# Patient Record
Sex: FEMALE | Race: WHITE
[De-identification: names, ages, dates, MRNs, and addresses within clinical notes are randomized per-mention and may not be internally consistent; named-entity substitution may affect disease eponyms.]

---

## 2018-02-18 ENCOUNTER — HOSPITAL ENCOUNTER (EMERGENCY)
Dept: HOSPITAL 47 - EC | Age: 53
Discharge: HOME | End: 2018-02-18
Payer: COMMERCIAL

## 2018-02-18 VITALS — TEMPERATURE: 98.4 F

## 2018-02-18 VITALS — DIASTOLIC BLOOD PRESSURE: 63 MMHG | HEART RATE: 99 BPM | SYSTOLIC BLOOD PRESSURE: 115 MMHG | RESPIRATION RATE: 16 BRPM

## 2018-02-18 DIAGNOSIS — R10.84: ICD-10-CM

## 2018-02-18 DIAGNOSIS — R19.7: ICD-10-CM

## 2018-02-18 DIAGNOSIS — Z90.49: ICD-10-CM

## 2018-02-18 DIAGNOSIS — Z90.710: ICD-10-CM

## 2018-02-18 DIAGNOSIS — R11.2: Primary | ICD-10-CM

## 2018-02-18 DIAGNOSIS — Z88.8: ICD-10-CM

## 2018-02-18 DIAGNOSIS — Z85.850: ICD-10-CM

## 2018-02-18 DIAGNOSIS — Z85.3: ICD-10-CM

## 2018-02-18 DIAGNOSIS — Z79.899: ICD-10-CM

## 2018-02-18 DIAGNOSIS — K21.9: ICD-10-CM

## 2018-02-18 LAB
ALBUMIN SERPL-MCNC: 4.6 G/DL (ref 3.5–5)
ALP SERPL-CCNC: 90 U/L (ref 38–126)
ALT SERPL-CCNC: 29 U/L (ref 9–52)
ANION GAP SERPL CALC-SCNC: 14 MMOL/L
AST SERPL-CCNC: 20 U/L (ref 14–36)
BASOPHILS # BLD AUTO: 0.1 K/UL (ref 0–0.2)
BASOPHILS NFR BLD AUTO: 0 %
BUN SERPL-SCNC: 15 MG/DL (ref 7–17)
CALCIUM SPEC-MCNC: 9.4 MG/DL (ref 8.4–10.2)
CHLORIDE SERPL-SCNC: 108 MMOL/L (ref 98–107)
CO2 SERPL-SCNC: 22 MMOL/L (ref 22–30)
EOSINOPHIL # BLD AUTO: 0.2 K/UL (ref 0–0.7)
EOSINOPHIL NFR BLD AUTO: 1 %
ERYTHROCYTE [DISTWIDTH] IN BLOOD BY AUTOMATED COUNT: 5 M/UL (ref 3.8–5.4)
ERYTHROCYTE [DISTWIDTH] IN BLOOD: 12.7 % (ref 11.5–15.5)
GLUCOSE SERPL-MCNC: 114 MG/DL (ref 74–99)
HCT VFR BLD AUTO: 46.3 % (ref 34–46)
HGB BLD-MCNC: 15.4 GM/DL (ref 11.4–16)
HYALINE CASTS UR QL AUTO: 3 /LPF (ref 0–2)
LIPASE SERPL-CCNC: 62 U/L (ref 23–300)
LYMPHOCYTES # SPEC AUTO: 0.8 K/UL (ref 1–4.8)
LYMPHOCYTES NFR SPEC AUTO: 6 %
MCH RBC QN AUTO: 30.7 PG (ref 25–35)
MCHC RBC AUTO-ENTMCNC: 33.1 G/DL (ref 31–37)
MCV RBC AUTO: 92.7 FL (ref 80–100)
MONOCYTES # BLD AUTO: 0.4 K/UL (ref 0–1)
MONOCYTES NFR BLD AUTO: 3 %
NEUTROPHILS # BLD AUTO: 13 K/UL (ref 1.3–7.7)
NEUTROPHILS NFR BLD AUTO: 89 %
PH UR: 5 [PH] (ref 5–8)
PLATELET # BLD AUTO: 251 K/UL (ref 150–450)
POTASSIUM SERPL-SCNC: 4.2 MMOL/L (ref 3.5–5.1)
PROT SERPL-MCNC: 7.8 G/DL (ref 6.3–8.2)
PROT UR QL: (no result)
RBC UR QL: 1 /HPF (ref 0–5)
SODIUM SERPL-SCNC: 144 MMOL/L (ref 137–145)
SP GR UR: 1.03 (ref 1–1.03)
SQUAMOUS UR QL AUTO: 2 /HPF (ref 0–4)
UROBILINOGEN UR QL STRIP: <2 MG/DL (ref ?–2)
WBC # BLD AUTO: 14.6 K/UL (ref 3.8–10.6)
WBC #/AREA URNS HPF: 2 /HPF (ref 0–5)

## 2018-02-18 PROCEDURE — 87502 INFLUENZA DNA AMP PROBE: CPT

## 2018-02-18 PROCEDURE — 80053 COMPREHEN METABOLIC PANEL: CPT

## 2018-02-18 PROCEDURE — 81001 URINALYSIS AUTO W/SCOPE: CPT

## 2018-02-18 PROCEDURE — 96372 THER/PROPH/DIAG INJ SC/IM: CPT

## 2018-02-18 PROCEDURE — 36415 COLL VENOUS BLD VENIPUNCTURE: CPT

## 2018-02-18 PROCEDURE — 96361 HYDRATE IV INFUSION ADD-ON: CPT

## 2018-02-18 PROCEDURE — 96375 TX/PRO/DX INJ NEW DRUG ADDON: CPT

## 2018-02-18 PROCEDURE — 99284 EMERGENCY DEPT VISIT MOD MDM: CPT

## 2018-02-18 PROCEDURE — 96374 THER/PROPH/DIAG INJ IV PUSH: CPT

## 2018-02-18 PROCEDURE — 83690 ASSAY OF LIPASE: CPT

## 2018-02-18 PROCEDURE — 85025 COMPLETE CBC W/AUTO DIFF WBC: CPT

## 2018-02-18 NOTE — ED
Nausea/Vomiting/Diarrhea HPI





- General


Chief complaint: Nausea/Vomiting/Diarrhea


Stated complaint: NVD


Time Seen by Provider: 02/18/18 16:53


Source: patient


Mode of arrival: wheelchair


Limitations: no limitations





- History of Present Illness


Initial comments: 


Patient presents with one day of nausea vomiting diarrhea.  Patient states 

associated with diffuse nominal cramping pain.  Denies blood in vomit or stool.

  Denies fever.  Patient states history of cholecystectomy.  Patient's son was 

seen and treated in the ER for the same symptoms 3 days ago by myself. 





MD complaint: nausea, vomiting, diarrhea, abdominal pain





- Related Data


 Home Medications











 Medication  Instructions  Recorded  Confirmed


 


ALPRAZolam [Xanax] 0.25 mg PO DAILY PRN 02/18/18 02/18/18


 


Butalb/APAP/Caff -40Mg 1 tab PO Q4H PRN 02/18/18 02/18/18





[Fioricet -40]   


 


Calcium Carbonate [Tums] 500 mg PO QID PRN 02/18/18 02/18/18


 


Docusate [Colace] 100 mg PO DAILY PRN 02/18/18 02/18/18


 


Ergocalciferol [Vitamin D2] 50,000 unit PO Q7D 02/18/18 02/18/18


 


Hydrocodone/Acetaminophen [Norco 1 tab PO Q6H PRN 02/18/18 02/18/18





]   


 


Levothyroxine Sodium [Synthroid] 175 mcg PO DAILY 02/18/18 02/18/18


 


Lidocaine-Prilocaine Cream [Emla 1 applic TOPICAL AS DIRECTED PRN 02/18/18 02/18 /18





Cream 2.5%/2.5%]   


 


Loratadine 10 mg PO DAILY 02/18/18 02/18/18


 


Omeprazole 20 mg PO DAILY 02/18/18 02/18/18


 


Prochlorperazine [Compazine] 10 mg PO BID PRN 02/18/18 02/18/18


 


Sodium Chloride [Saline Nasal 1 spray EA NOSTRIL DAILY PRN 02/18/18 02/18/18





Spray]   


 


Tamoxifen Citrate 20 mg PO DAILY 02/18/18 02/18/18








 Previous Rx's











 Medication  Instructions  Recorded


 


Dicyclomine [Bentyl] 20 mg PO BID PRN #10 tablet 02/18/18


 


Ondansetron Odt [Zofran Odt] 4 mg PO Q8HR PRN #15 tab 02/18/18











 Allergies











Allergy/AdvReac Type Severity Reaction Status Date / Time


 


gabapentin Allergy  Swelling Verified 02/18/18 16:50














Review of Systems


ROS Statement: 


Those systems with pertinent positive or pertinent negative responses have been 

documented in the HPI.





ROS Other: All systems not noted in ROS Statement are negative.


Constitutional: Denies: fever, chills, weakness


Eyes: Denies: vision change


ENT: Denies: ear pain, throat pain, congestion


Respiratory: Denies: cough, dyspnea


Cardiovascular: Denies: chest pain, palpitations, syncope


Endocrine: Reports: fatigue


Gastrointestinal: Reports: abdominal pain, nausea, vomiting, diarrhea.  Denies: 

constipation, hematemesis, melena, hematochezia


Genitourinary: Denies: urgency, dysuria, frequency, hematuria


Musculoskeletal: Reports: other (LE mm cramps).  Denies: back pain


Skin: Denies: rash


Neurological: Denies: headache





Past Medical History


Past Medical History: Atrial Fibrillation, Cancer, Fibromyalgia, GERD/Reflux


Additional Past Medical History / Comment(s): breast and thyroid cancer , 

chronic back pain, diverticulitis


History of Any Multi-Drug Resistant Organisms: None Reported


Past Surgical History: Ablation, Cholecystectomy, Hysterectomy


Additional Past Surgical History / Comment(s): lumpectomy left breast, 

lobectomy thyroid


Past Psychological History: No Psychological Hx Reported


Smoking Status: Never smoker


Past Alcohol Use History: None Reported


Past Drug Use History: Marijuana





General Exam





- General Exam Comments


Initial Comments: 


Laying on bed on side hugging pillow, appears mild to moderately uncomfortable, 

although patient conversing normally and smiling and pleasant.





Limitations: no limitations


General appearance: alert


Head exam: Present: atraumatic, normocephalic


Eye exam: Present: normal appearance, PERRL, EOMI


ENT exam: Present: normal exam, normal oropharynx, mucous membranes moist, TM's 

normal bilaterally, other (Tympanic membranes clear bilaterally)


Neck exam: Present: normal inspection


Respiratory exam: Present: normal lung sounds bilaterally.  Absent: respiratory 

distress, wheezes, rales, rhonchi


Cardiovascular Exam: Present: regular rate, normal rhythm


GI/Abdominal exam: Present: soft, tenderness, other (Mild diffuse tenderness).  

Absent: distended, guarding, rebound, rigid, hernia


Extremities exam: Present: normal inspection


Neurological exam: Present: alert, oriented X3


Psychiatric exam: Present: normal affect, normal mood


Skin exam: Present: warm, dry, intact, normal color.  Absent: rash, cyanosis





Course


 Vital Signs











  02/18/18 02/18/18





  16:32 18:37


 


Temperature 98.2 F 


 


Pulse Rate 116 H 99


 


Respiratory 18 16





Rate  


 


Blood Pressure 125/69 115/63


 


O2 Sat by Pulse 95 





Oximetry  














Medical Decision Making





- Medical Decision Making


IV fluids, Pepcid, Zofran, Toradol, Bentyl given





Mild elevation of white blood cell count.





No other significant lab abnormalities.





Patient reevaluated, states she is feeling much better following medications.  

States nausea resolved following medication.  Patient feels comfortable going 

home.  Supportive care discussed.  Prescription of Zofran and Bentyl given.  

Discussed oral hydration.  Patient to follow primary care physician.  Return to 

ER for new or worsening symptoms including increased abdominal pain, not 

tolerating oral intake, fevers.  Patient understands and agrees.  Patient is 

very happy with her care.








- Lab Data


Result diagrams: 


 02/18/18 17:05





 02/18/18 17:05


 Lab Results











  02/18/18 02/18/18 02/18/18 Range/Units





  17:05 17:05 17:05 


 


WBC  14.6 H    (3.8-10.6)  k/uL


 


RBC  5.00    (3.80-5.40)  m/uL


 


Hgb  15.4    (11.4-16.0)  gm/dL


 


Hct  46.3 H    (34.0-46.0)  %


 


MCV  92.7    (80.0-100.0)  fL


 


MCH  30.7    (25.0-35.0)  pg


 


MCHC  33.1    (31.0-37.0)  g/dL


 


RDW  12.7    (11.5-15.5)  %


 


Plt Count  251    (150-450)  k/uL


 


Neutrophils %  89    %


 


Lymphocytes %  6    %


 


Monocytes %  3    %


 


Eosinophils %  1    %


 


Basophils %  0    %


 


Neutrophils #  13.0 H    (1.3-7.7)  k/uL


 


Lymphocytes #  0.8 L    (1.0-4.8)  k/uL


 


Monocytes #  0.4    (0-1.0)  k/uL


 


Eosinophils #  0.2    (0-0.7)  k/uL


 


Basophils #  0.1    (0-0.2)  k/uL


 


Sodium   144   (137-145)  mmol/L


 


Potassium   4.2   (3.5-5.1)  mmol/L


 


Chloride   108 H   ()  mmol/L


 


Carbon Dioxide   22   (22-30)  mmol/L


 


Anion Gap   14   mmol/L


 


BUN   15   (7-17)  mg/dL


 


Creatinine   0.83   (0.52-1.04)  mg/dL


 


Est GFR (MDRD) Af Amer   >60   (>60 ml/min/1.73 sqM)  


 


Est GFR (MDRD) Non-Af   >60   (>60 ml/min/1.73 sqM)  


 


Glucose   114 H   (74-99)  mg/dL


 


Calcium   9.4   (8.4-10.2)  mg/dL


 


Total Bilirubin   0.8   (0.2-1.3)  mg/dL


 


AST   20   (14-36)  U/L


 


ALT   29   (9-52)  U/L


 


Alkaline Phosphatase   90   ()  U/L


 


Total Protein   7.8   (6.3-8.2)  g/dL


 


Albumin   4.6   (3.5-5.0)  g/dL


 


Lipase   62   ()  U/L


 


Urine Color     


 


Urine Appearance     (Clear)  


 


Urine pH     (5.0-8.0)  


 


Ur Specific Gravity     (1.001-1.035)  


 


Urine Protein     (Negative)  


 


Urine Glucose (UA)     (Negative)  


 


Urine Ketones     (Negative)  


 


Urine Blood     (Negative)  


 


Urine Nitrite     (Negative)  


 


Urine Bilirubin     (Negative)  


 


Urine Urobilinogen     (<2.0)  mg/dL


 


Ur Leukocyte Esterase     (Negative)  


 


Urine RBC     (0-5)  /hpf


 


Urine WBC     (0-5)  /hpf


 


Ur Squamous Epith Cells     (0-4)  /hpf


 


Amorphous Sediment     (None)  /hpf


 


Hyaline Casts     (0-2)  /lpf


 


Urine Mucus     (None)  /hpf


 


Influenza Type A RNA    Not Detected  (Not Detectd)  


 


Influenza Type B (PCR)    Not Detected  (Not Detectd)  














  02/18/18 Range/Units





  17:55 


 


WBC   (3.8-10.6)  k/uL


 


RBC   (3.80-5.40)  m/uL


 


Hgb   (11.4-16.0)  gm/dL


 


Hct   (34.0-46.0)  %


 


MCV   (80.0-100.0)  fL


 


MCH   (25.0-35.0)  pg


 


MCHC   (31.0-37.0)  g/dL


 


RDW   (11.5-15.5)  %


 


Plt Count   (150-450)  k/uL


 


Neutrophils %   %


 


Lymphocytes %   %


 


Monocytes %   %


 


Eosinophils %   %


 


Basophils %   %


 


Neutrophils #   (1.3-7.7)  k/uL


 


Lymphocytes #   (1.0-4.8)  k/uL


 


Monocytes #   (0-1.0)  k/uL


 


Eosinophils #   (0-0.7)  k/uL


 


Basophils #   (0-0.2)  k/uL


 


Sodium   (137-145)  mmol/L


 


Potassium   (3.5-5.1)  mmol/L


 


Chloride   ()  mmol/L


 


Carbon Dioxide   (22-30)  mmol/L


 


Anion Gap   mmol/L


 


BUN   (7-17)  mg/dL


 


Creatinine   (0.52-1.04)  mg/dL


 


Est GFR (MDRD) Af Amer   (>60 ml/min/1.73 sqM)  


 


Est GFR (MDRD) Non-Af   (>60 ml/min/1.73 sqM)  


 


Glucose   (74-99)  mg/dL


 


Calcium   (8.4-10.2)  mg/dL


 


Total Bilirubin   (0.2-1.3)  mg/dL


 


AST   (14-36)  U/L


 


ALT   (9-52)  U/L


 


Alkaline Phosphatase   ()  U/L


 


Total Protein   (6.3-8.2)  g/dL


 


Albumin   (3.5-5.0)  g/dL


 


Lipase   ()  U/L


 


Urine Color  Yellow  


 


Urine Appearance  Cloudy H  (Clear)  


 


Urine pH  5.0  (5.0-8.0)  


 


Ur Specific Gravity  1.029  (1.001-1.035)  


 


Urine Protein  1+ H  (Negative)  


 


Urine Glucose (UA)  Negative  (Negative)  


 


Urine Ketones  Negative  (Negative)  


 


Urine Blood  Negative  (Negative)  


 


Urine Nitrite  Negative  (Negative)  


 


Urine Bilirubin  Negative  (Negative)  


 


Urine Urobilinogen  <2.0  (<2.0)  mg/dL


 


Ur Leukocyte Esterase  Negative  (Negative)  


 


Urine RBC  1  (0-5)  /hpf


 


Urine WBC  2  (0-5)  /hpf


 


Ur Squamous Epith Cells  2  (0-4)  /hpf


 


Amorphous Sediment  Moderate H  (None)  /hpf


 


Hyaline Casts  3 H  (0-2)  /lpf


 


Urine Mucus  Many H  (None)  /hpf


 


Influenza Type A RNA   (Not Detectd)  


 


Influenza Type B (PCR)   (Not Detectd)  














Disposition


Clinical Impression: 


 Nausea and vomiting, Diarrhea, Abdominal pain





Disposition: HOME SELF-CARE


Condition: Good


Instructions:  Acute Nausea and Vomiting (ED), Acute Diarrhea (ED), Abdominal 

Pain (ED)


Additional Instructions: 


Salvatore primary care physician one to 2 days for reevaluation.  Return to ER if 

new or worsening symptoms including inability to tolerate oral intake or 

increased abdominal pain or fevers.


Prescriptions: 


Dicyclomine [Bentyl] 20 mg PO BID PRN #10 tablet


 PRN Reason: abdominal cramps


Ondansetron Odt [Zofran Odt] 4 mg PO Q8HR PRN #15 tab


 PRN Reason: Nausea


Referrals: 


Jose Elias Johnson MD [Primary Care Provider] - 1-2 days

## 2018-12-04 ENCOUNTER — HOSPITAL ENCOUNTER (EMERGENCY)
Dept: HOSPITAL 47 - EC | Age: 53
Discharge: HOME | End: 2018-12-04
Payer: COMMERCIAL

## 2018-12-04 VITALS — RESPIRATION RATE: 16 BRPM | TEMPERATURE: 99.4 F

## 2018-12-04 VITALS — HEART RATE: 82 BPM | DIASTOLIC BLOOD PRESSURE: 77 MMHG | SYSTOLIC BLOOD PRESSURE: 118 MMHG

## 2018-12-04 DIAGNOSIS — M79.7: ICD-10-CM

## 2018-12-04 DIAGNOSIS — Z88.8: ICD-10-CM

## 2018-12-04 DIAGNOSIS — Z79.899: ICD-10-CM

## 2018-12-04 DIAGNOSIS — K21.9: ICD-10-CM

## 2018-12-04 DIAGNOSIS — R07.89: Primary | ICD-10-CM

## 2018-12-04 DIAGNOSIS — Z85.3: ICD-10-CM

## 2018-12-04 DIAGNOSIS — I48.91: ICD-10-CM

## 2018-12-04 DIAGNOSIS — Z85.850: ICD-10-CM

## 2018-12-04 LAB
ALBUMIN SERPL-MCNC: 4.3 G/DL (ref 3.5–5)
ALP SERPL-CCNC: 77 U/L (ref 38–126)
ALT SERPL-CCNC: 22 U/L (ref 9–52)
AMYLASE SERPL-CCNC: 42 U/L (ref 30–110)
ANION GAP SERPL CALC-SCNC: 10 MMOL/L
APTT BLD: 22 SEC (ref 22–30)
AST SERPL-CCNC: 27 U/L (ref 14–36)
BASOPHILS # BLD AUTO: 0.1 K/UL (ref 0–0.2)
BASOPHILS NFR BLD AUTO: 1 %
BUN SERPL-SCNC: 12 MG/DL (ref 7–17)
CALCIUM SPEC-MCNC: 9 MG/DL (ref 8.4–10.2)
CHLORIDE SERPL-SCNC: 107 MMOL/L (ref 98–107)
CK SERPL-CCNC: 45 U/L (ref 30–135)
CO2 SERPL-SCNC: 24 MMOL/L (ref 22–30)
D DIMER PPP FEU-MCNC: 0.45 MG/L FEU (ref ?–0.6)
EOSINOPHIL # BLD AUTO: 0.2 K/UL (ref 0–0.7)
EOSINOPHIL NFR BLD AUTO: 3 %
ERYTHROCYTE [DISTWIDTH] IN BLOOD BY AUTOMATED COUNT: 4.87 M/UL (ref 3.8–5.4)
ERYTHROCYTE [DISTWIDTH] IN BLOOD: 12.9 % (ref 11.5–15.5)
GLUCOSE SERPL-MCNC: 74 MG/DL (ref 74–99)
HCT VFR BLD AUTO: 44.8 % (ref 34–46)
HGB BLD-MCNC: 14.9 GM/DL (ref 11.4–16)
INR PPP: 1 (ref ?–1.2)
LIPASE SERPL-CCNC: 96 U/L (ref 23–300)
LYMPHOCYTES # SPEC AUTO: 2.7 K/UL (ref 1–4.8)
LYMPHOCYTES NFR SPEC AUTO: 32 %
MAGNESIUM SPEC-SCNC: 2 MG/DL (ref 1.6–2.3)
MCH RBC QN AUTO: 30.6 PG (ref 25–35)
MCHC RBC AUTO-ENTMCNC: 33.3 G/DL (ref 31–37)
MCV RBC AUTO: 91.9 FL (ref 80–100)
MONOCYTES # BLD AUTO: 0.6 K/UL (ref 0–1)
MONOCYTES NFR BLD AUTO: 7 %
NEUTROPHILS # BLD AUTO: 4.6 K/UL (ref 1.3–7.7)
NEUTROPHILS NFR BLD AUTO: 55 %
PLATELET # BLD AUTO: 243 K/UL (ref 150–450)
POTASSIUM SERPL-SCNC: 4.5 MMOL/L (ref 3.5–5.1)
PROT SERPL-MCNC: 7.7 G/DL (ref 6.3–8.2)
PT BLD: 9.9 SEC (ref 9–12)
SODIUM SERPL-SCNC: 141 MMOL/L (ref 137–145)
TROPONIN I SERPL-MCNC: <0.012 NG/ML (ref 0–0.03)
WBC # BLD AUTO: 8.3 K/UL (ref 3.8–10.6)

## 2018-12-04 PROCEDURE — 99285 EMERGENCY DEPT VISIT HI MDM: CPT

## 2018-12-04 PROCEDURE — 82553 CREATINE MB FRACTION: CPT

## 2018-12-04 PROCEDURE — 71046 X-RAY EXAM CHEST 2 VIEWS: CPT

## 2018-12-04 PROCEDURE — 83690 ASSAY OF LIPASE: CPT

## 2018-12-04 PROCEDURE — 80053 COMPREHEN METABOLIC PANEL: CPT

## 2018-12-04 PROCEDURE — 83735 ASSAY OF MAGNESIUM: CPT

## 2018-12-04 PROCEDURE — 85025 COMPLETE CBC W/AUTO DIFF WBC: CPT

## 2018-12-04 PROCEDURE — 71275 CT ANGIOGRAPHY CHEST: CPT

## 2018-12-04 PROCEDURE — 93005 ELECTROCARDIOGRAM TRACING: CPT

## 2018-12-04 PROCEDURE — 84484 ASSAY OF TROPONIN QUANT: CPT

## 2018-12-04 PROCEDURE — 85730 THROMBOPLASTIN TIME PARTIAL: CPT

## 2018-12-04 PROCEDURE — 36415 COLL VENOUS BLD VENIPUNCTURE: CPT

## 2018-12-04 PROCEDURE — 83880 ASSAY OF NATRIURETIC PEPTIDE: CPT

## 2018-12-04 PROCEDURE — 85610 PROTHROMBIN TIME: CPT

## 2018-12-04 PROCEDURE — 96374 THER/PROPH/DIAG INJ IV PUSH: CPT

## 2018-12-04 PROCEDURE — 85379 FIBRIN DEGRADATION QUANT: CPT

## 2018-12-04 PROCEDURE — 82550 ASSAY OF CK (CPK): CPT

## 2018-12-04 PROCEDURE — 82150 ASSAY OF AMYLASE: CPT

## 2018-12-04 NOTE — XR
EXAMINATION TYPE: XR chest 2V

 

DATE OF EXAM: 12/4/2018

 

COMPARISON: NONE

 

HISTORY: Chest pain

 

TECHNIQUE:  Frontal and lateral views of the chest are obtained.

 

FINDINGS:  There is no heart failure nor confluent pneumonic infiltrate. Costophrenic angles are tucker
r. Bony thorax is intact. Pulmonary vascularity is normal.

 

IMPRESSION:  No active cardiopulmonary disease.

## 2018-12-04 NOTE — CT
EXAMINATION TYPE: CT angio chest

 

DATE OF EXAM: 12/4/2018 8:07 PM

 

COMPARISON: None

 

HISTORY: chest pain radiating to shoulder blades

 

CT DLP: 1562.2 mGycm

Automated exposure control for dose reduction was used.

 

CONTRAST: 

CTA scan of the thorax is performed with IV Contrast, patient injected with 100 mL of Isovue 370, pul
monary embolism protocol.  There are 3-D post processed images..  

 

FINDINGS:

 

 

The lungs are clear of consolidation. There is no evidence of a pulmonary mass. There is no pleural e
ffusion. There is diffuse fatty infiltration of the liver.

 

Heart size is normal. There is no pericardial effusion. There are no hilar masses. There is no medias
tinal adenopathy. Thoracic aorta is intact without evidence of aneurysm or dissection.

 

There is normal contrast opacification of the pulmonary arteries. There are no filling defect. The jessika
ny thorax is intact.

 

IMPRESSION: 

NO EVIDENCE OF PULMONARY EMBOLISM. FATTY INFILTRATION OF THE LIVER.

## 2018-12-04 NOTE — ED
General Adult HPI





- General


Source: patient, RN notes reviewed


Mode of arrival: wheelchair


Limitations: no limitations





<Pasha Strong P - Last Filed: 12/05/18 23:11>





<Gail Pennington P - Last Filed: 12/13/18 00:25>





- General


Chief complaint: Chest Pain


Stated complaint: chest pain


Time Seen by Provider: 12/04/18 17:51





- History of Present Illness


Initial comments: 





53-year-old female with a past medical history of A. fib, breast and thyroid 

cancer, fibromyalgia, GERD presents to the emergency department for a chief 

complaint of chest pain 2 weeks.  Patient states this pain is in the left of 

her chest and is a sharp stabbing pain.  She states it radiates to her back and 

neck.  Patient states this has been constant for the past 2 weeks.  Patient 

states she thought it was pleurisy but wanted to make sure nothing else was 

going on.  Patient denies smoking.  She denies history of asthma.  Patient 

states laying down makes the pain better and bending forward makes the pain 

worse.  Patient states breathing makes the pain worse as well.  Patient denies 

pain in her legs. Patient has no other complaints at this time including s 

abdominal pain, nausea or vomiting, headache, or visual changes. (Pasha Strong)





- Related Data


 Home Medications











 Medication  Instructions  Recorded  Confirmed


 


Butalb/APAP/Caff -40Mg 1 tab PO Q4H PRN 02/18/18 12/04/18





[Fioricet -40]   


 


Calcium Carbonate [Tums] 500 mg PO QID PRN 02/18/18 12/04/18


 


Docusate [Colace] 100 mg PO DAILY PRN 02/18/18 12/04/18


 


Ergocalciferol [Vitamin D2] 50,000 unit PO MACHADO 02/18/18 12/04/18


 


Hydrocodone/Acetaminophen [Norco 1 tab PO Q6H PRN 02/18/18 12/04/18





]   


 


Levothyroxine Sodium [Synthroid] 175 mcg PO [id number] 02/18/18 12/04/18


 


Loratadine 10 mg PO DAILY 02/18/18 12/04/18


 


Omeprazole 20 mg PO DAILY 02/18/18 12/04/18


 


Sodium Chloride [Saline Nasal 1 spray EA NOSTRIL DAILY PRN 02/18/18 12/04/18





Spray]   


 


Tamoxifen Citrate 20 mg PO DAILY 02/18/18 12/04/18


 


ALPRAZolam [Xanax] 0.5 mg PO HS PRN 12/04/18 12/04/18


 


Levothyroxine Sodium [Synthroid] 87.5 mcg PO MACHADO 12/04/18 12/04/18











 Allergies











Allergy/AdvReac Type Severity Reaction Status Date / Time


 


gabapentin Allergy  Rash/Hives Verified 12/04/18 17:49














Review of Systems


ROS Other: All systems not noted in ROS Statement are negative.





<Pasha Strong P - Last Filed: 12/05/18 23:11>


ROS Other: All systems not noted in ROS Statement are negative.





<Gail Pennington P - Last Filed: 12/13/18 00:25>


ROS Statement: 


Those systems with pertinent positive or pertinent negative responses have been 

documented in the HPI.








Past Medical History


Past Medical History: Atrial Fibrillation, Cancer, Fibromyalgia, GERD/Reflux


Additional Past Medical History / Comment(s): breast and thyroid cancer , 

chronic back pain, diverticulitis


History of Any Multi-Drug Resistant Organisms: None Reported


Past Surgical History: Ablation, Cholecystectomy, Hysterectomy


Additional Past Surgical History / Comment(s): lumpectomy left breast, 

lobectomy thyroid


Past Psychological History: No Psychological Hx Reported


Smoking Status: Never smoker


Past Alcohol Use History: None Reported


Past Drug Use History: Marijuana





<Pasha Strong P - Last Filed: 12/05/18 23:11>





General Exam


Limitations: no limitations


General appearance: alert, in no apparent distress


Head exam: Present: atraumatic, normocephalic, normal inspection


Eye exam: Present: normal appearance, PERRL, EOMI.  Absent: scleral icterus, 

conjunctival injection, periorbital swelling


ENT exam: Present: normal exam, mucous membranes moist


Neck exam: Present: normal inspection, full ROM.  Absent: tenderness, 

meningismus, lymphadenopathy


Respiratory exam: Present: normal lung sounds bilaterally, chest wall 

tenderness (Patient does have some left-sided anterior chest wall tenderness).  

Absent: respiratory distress, wheezes, rales, rhonchi, stridor


Cardiovascular Exam: Present: regular rate, normal rhythm, normal heart sounds.

  Absent: systolic murmur, diastolic murmur, rubs, gallop, clicks


Back exam: Present: tenderness (Tenderness noted to the left thoracic 

paraspinal area).  Absent: vertebral tenderness


Neurological exam: Present: alert, oriented X3, CN II-XII intact


Psychiatric exam: Present: normal affect, normal mood





<Pasha Strong P - Last Filed: 12/05/18 23:11>





 Vital Signs











  12/04/18 12/04/18





  17:53 21:34


 


Temperature 99.4 F 


 


Pulse Rate 93 82


 


Respiratory 16 16





Rate  


 


Blood Pressure 125/81 118/77


 


O2 Sat by Pulse 93 L 98





Oximetry  














EKG Findings





- EKG Comments:


EKG Findings:: Normal sinus rhythm, ventricular rate 94, MA interval 146, QTC 

457, no evidence of ST elevation or depression





<Pasha Strong P - Last Filed: 12/05/18 23:11>





Medical Decision Making





- Lab Data


Result diagrams: 


 12/04/18 18:10





 12/04/18 18:10





<Pasha Strong P - Last Filed: 12/05/18 23:11>





- Lab Data


Result diagrams: 


 12/04/18 18:10





 12/04/18 18:10





<Gail Pennington P - Last Filed: 12/13/18 00:25>





- Medical Decision Making


53-year-old female with a past medical history of A. fib, breast and thyroid 

cancer, fibromyalgia, GERD presents to the emergency department for a chief of 

chest pain 2 weeks.  The pain is the left side of her chest radiating to her 

back.  Patient states it is pleuritic.  She states has been constant for 2 

weeks.  Patient denies history of smoking, family history of heart disease, 

previous MI, hypertension, hypercholesterolemia, diabetes.  Heart score of 1-2 

which is low risk.  CBC and CMP are unremarkable.  Troponin negative.  D-dimer 

0.45.  CTA of the chest was ordered as pain is radiating to the back which was 

negative for any evidence of aneurysm or dissection.  No evidence of pulmonary 

embolism.  Chest x-ray shows no active cardiopulmonary disease.  On 

reevaluation patient states pain does feel worse when she moves and presses on 

the area.  Patient states he could be a muscle.  Discussed with patient that at 

this time as pain has been ongoing for weeks and lab work is negative patient 

can follow up outpatient.  Patient agrees with this.  She will return if she 

has any worsening symptoms. (Pasha Strong)


I was available for consultation in the emergency department. The history and 

physical exam were done by the midlevel provider.  I was consulted for this 

patient's care.  I reviewed the case with the midlevel provider and based on 

their presentation of the patient, I agree with the assessment, medical 

decision making and plan of care as documented.


 (aGil Pennington)





- Lab Data





 Lab Results











  12/04/18 12/04/18 12/04/18 Range/Units





  18:10 18:10 18:10 


 


WBC    8.3  (3.8-10.6)  k/uL


 


RBC    4.87  (3.80-5.40)  m/uL


 


Hgb    14.9  (11.4-16.0)  gm/dL


 


Hct    44.8  (34.0-46.0)  %


 


MCV    91.9  (80.0-100.0)  fL


 


MCH    30.6  (25.0-35.0)  pg


 


MCHC    33.3  (31.0-37.0)  g/dL


 


RDW    12.9  (11.5-15.5)  %


 


Plt Count    243  (150-450)  k/uL


 


Neutrophils %    55  %


 


Lymphocytes %    32  %


 


Monocytes %    7  %


 


Eosinophils %    3  %


 


Basophils %    1  %


 


Neutrophils #    4.6  (1.3-7.7)  k/uL


 


Lymphocytes #    2.7  (1.0-4.8)  k/uL


 


Monocytes #    0.6  (0-1.0)  k/uL


 


Eosinophils #    0.2  (0-0.7)  k/uL


 


Basophils #    0.1  (0-0.2)  k/uL


 


PT     (9.0-12.0)  sec


 


INR     (<1.2)  


 


APTT     (22.0-30.0)  sec


 


D-Dimer     (<0.60)  mg/L FEU


 


Sodium  141    (137-145)  mmol/L


 


Potassium  4.5    (3.5-5.1)  mmol/L


 


Chloride  107    ()  mmol/L


 


Carbon Dioxide  24    (22-30)  mmol/L


 


Anion Gap  10    mmol/L


 


BUN  12    (7-17)  mg/dL


 


Creatinine  0.59    (0.52-1.04)  mg/dL


 


Est GFR (CKD-EPI)AfAm  >90    (>60 ml/min/1.73 sqM)  


 


Est GFR (CKD-EPI)NonAf  >90    (>60 ml/min/1.73 sqM)  


 


Glucose  74    (74-99)  mg/dL


 


Calcium  9.0    (8.4-10.2)  mg/dL


 


Magnesium  2.0    (1.6-2.3)  mg/dL


 


Total Bilirubin  0.4    (0.2-1.3)  mg/dL


 


AST  27    (14-36)  U/L


 


ALT  22    (9-52)  U/L


 


Alkaline Phosphatase  77    ()  U/L


 


Total Creatine Kinase   45   ()  U/L


 


CK-MB (CK-2)   <0.2   (0.0-2.4)  ng/mL


 


CK-MB (CK-2) Rel Index      


 


Troponin I   <0.012   (0.000-0.034)  ng/mL


 


NT-Pro-B Natriuret Pep     pg/mL


 


Total Protein  7.7    (6.3-8.2)  g/dL


 


Albumin  4.3    (3.5-5.0)  g/dL


 


Amylase  42    ()  U/L


 


Lipase  96    ()  U/L














  12/04/18 12/04/18 Range/Units





  18:10 18:10 


 


WBC    (3.8-10.6)  k/uL


 


RBC    (3.80-5.40)  m/uL


 


Hgb    (11.4-16.0)  gm/dL


 


Hct    (34.0-46.0)  %


 


MCV    (80.0-100.0)  fL


 


MCH    (25.0-35.0)  pg


 


MCHC    (31.0-37.0)  g/dL


 


RDW    (11.5-15.5)  %


 


Plt Count    (150-450)  k/uL


 


Neutrophils %    %


 


Lymphocytes %    %


 


Monocytes %    %


 


Eosinophils %    %


 


Basophils %    %


 


Neutrophils #    (1.3-7.7)  k/uL


 


Lymphocytes #    (1.0-4.8)  k/uL


 


Monocytes #    (0-1.0)  k/uL


 


Eosinophils #    (0-0.7)  k/uL


 


Basophils #    (0-0.2)  k/uL


 


PT  9.9   (9.0-12.0)  sec


 


INR  1.0   (<1.2)  


 


APTT  22.0   (22.0-30.0)  sec


 


D-Dimer  0.45   (<0.60)  mg/L FEU


 


Sodium    (137-145)  mmol/L


 


Potassium    (3.5-5.1)  mmol/L


 


Chloride    ()  mmol/L


 


Carbon Dioxide    (22-30)  mmol/L


 


Anion Gap    mmol/L


 


BUN    (7-17)  mg/dL


 


Creatinine    (0.52-1.04)  mg/dL


 


Est GFR (CKD-EPI)AfAm    (>60 ml/min/1.73 sqM)  


 


Est GFR (CKD-EPI)NonAf    (>60 ml/min/1.73 sqM)  


 


Glucose    (74-99)  mg/dL


 


Calcium    (8.4-10.2)  mg/dL


 


Magnesium    (1.6-2.3)  mg/dL


 


Total Bilirubin    (0.2-1.3)  mg/dL


 


AST    (14-36)  U/L


 


ALT    (9-52)  U/L


 


Alkaline Phosphatase    ()  U/L


 


Total Creatine Kinase    ()  U/L


 


CK-MB (CK-2)    (0.0-2.4)  ng/mL


 


CK-MB (CK-2) Rel Index    


 


Troponin I    (0.000-0.034)  ng/mL


 


NT-Pro-B Natriuret Pep   325  pg/mL


 


Total Protein    (6.3-8.2)  g/dL


 


Albumin    (3.5-5.0)  g/dL


 


Amylase    ()  U/L


 


Lipase    ()  U/L














Disposition


Is patient prescribed a controlled substance at d/c from ED?: No


Time of Disposition: 21:29





<Pasha Strong P - Last Filed: 12/05/18 23:11>





<Gail Pennington P - Last Filed: 12/13/18 00:25>


Clinical Impression: 


 Atypical chest pain





Disposition: HOME SELF-CARE


Condition: Good


Instructions:  Chest Pain (ED)


Additional Instructions: 


Please take Motrin and Tylenol for pain.  Please follow up with primary care in 

1-2 days.  Please return to the emergency department if you have any worsening 

symptoms.


Referrals: 


Jose Elias Johnson MD [Primary Care Provider] - 1-2 days

## 2019-11-09 ENCOUNTER — HOSPITAL ENCOUNTER (OUTPATIENT)
Dept: HOSPITAL 47 - RADPETMAIN | Age: 54
Discharge: HOME | End: 2019-11-09
Attending: INTERNAL MEDICINE
Payer: COMMERCIAL

## 2019-11-09 DIAGNOSIS — C79.51: Primary | ICD-10-CM

## 2019-11-09 DIAGNOSIS — C50.412: ICD-10-CM

## 2019-11-09 PROCEDURE — 78815 PET IMAGE W/CT SKULL-THIGH: CPT

## 2019-11-12 NOTE — PE
Nuclear medicine PET/CT

 

HISTORY: Breast carcinoma

 

Patient received 12 mCi F-18 FDG intravenously in delayed scanning was performed from skull base to t
he mid thighs. Localization and attenuation correction CT scan was performed.

 

Correlation CT chest 12/4/2018

 

 

 

Neck and chest: Abnormal density present right upper lobe is ill-defined, axial image #61. No pleural
 or pericardial effusion. No cervical, supraclavicular, axillary, mediastinal, or hilar adenopathy.

 

ABDOMEN: Patient is post cholecystectomy. No evident liver mass or suspicious hypermetabolic uptake. 
Liver shows low attenuation likely due to hepatic steatosis. No adrenal mass. No retroperitoneal radha
opathy or ascites. Small umbilical hernia contains fat. Uterus and adnexal structures are not seen. N
o pelvic adenopathy.

 

Osseous structures: Multiple foci of decreased attenuation noted within the visualized calvarium, lyt
ic lesions extend through the inner and outer table in the left calvarium in multiple sites in its vi
sualized portion, approximately 20 lesions. Lytic lesions also noted within the cervical spine, posto
p changes noted status post anterior cervical fusion and discectomy. Multiple lytic foci are also pre
sent within the bilateral humerus, scapulae, manubrium with some associated sclerosis and SUV 13, alcides
ateral ribs and thoracic spine with SUV 3-6.3, pelvis with left acetabulum showing SUV 8.5, and scler
otic foci present within the vertebral bodies of the lumbar spine suggest prior vertebroplasty change
 with some areas of probable nontarget cement placement.  Left femur shows abnormal focus, SUV 6.0. I
nferior middle temporal fossa on the left shows a focus of hypermetabolic uptake, SUV 8.

 

IMPRESSION: Osseous metastatic disease.

## 2019-11-18 ENCOUNTER — HOSPITAL ENCOUNTER (INPATIENT)
Dept: HOSPITAL 47 - EC | Age: 54
LOS: 5 days | Discharge: HOME HEALTH | DRG: 641 | End: 2019-11-23
Attending: FAMILY MEDICINE | Admitting: FAMILY MEDICINE
Payer: COMMERCIAL

## 2019-11-18 VITALS — BODY MASS INDEX: 28.8 KG/M2

## 2019-11-18 DIAGNOSIS — J30.2: ICD-10-CM

## 2019-11-18 DIAGNOSIS — Z79.890: ICD-10-CM

## 2019-11-18 DIAGNOSIS — R11.2: ICD-10-CM

## 2019-11-18 DIAGNOSIS — Z85.850: ICD-10-CM

## 2019-11-18 DIAGNOSIS — K57.90: ICD-10-CM

## 2019-11-18 DIAGNOSIS — K76.0: ICD-10-CM

## 2019-11-18 DIAGNOSIS — Z98.1: ICD-10-CM

## 2019-11-18 DIAGNOSIS — M94.0: ICD-10-CM

## 2019-11-18 DIAGNOSIS — C79.51: ICD-10-CM

## 2019-11-18 DIAGNOSIS — E87.6: ICD-10-CM

## 2019-11-18 DIAGNOSIS — K21.9: ICD-10-CM

## 2019-11-18 DIAGNOSIS — E66.9: ICD-10-CM

## 2019-11-18 DIAGNOSIS — K59.00: ICD-10-CM

## 2019-11-18 DIAGNOSIS — I48.0: ICD-10-CM

## 2019-11-18 DIAGNOSIS — Z79.899: ICD-10-CM

## 2019-11-18 DIAGNOSIS — F41.1: ICD-10-CM

## 2019-11-18 DIAGNOSIS — Z90.710: ICD-10-CM

## 2019-11-18 DIAGNOSIS — E83.52: Primary | ICD-10-CM

## 2019-11-18 DIAGNOSIS — E89.0: ICD-10-CM

## 2019-11-18 DIAGNOSIS — Z88.1: ICD-10-CM

## 2019-11-18 DIAGNOSIS — M54.9: ICD-10-CM

## 2019-11-18 DIAGNOSIS — Z79.01: ICD-10-CM

## 2019-11-18 DIAGNOSIS — Z88.8: ICD-10-CM

## 2019-11-18 DIAGNOSIS — I10: ICD-10-CM

## 2019-11-18 DIAGNOSIS — T40.2X5A: ICD-10-CM

## 2019-11-18 DIAGNOSIS — G89.4: ICD-10-CM

## 2019-11-18 DIAGNOSIS — Z82.5: ICD-10-CM

## 2019-11-18 DIAGNOSIS — Z92.3: ICD-10-CM

## 2019-11-18 DIAGNOSIS — C50.919: ICD-10-CM

## 2019-11-18 DIAGNOSIS — M79.7: ICD-10-CM

## 2019-11-18 LAB
ALBUMIN SERPL-MCNC: 3.8 G/DL (ref 3.5–5)
ALBUMIN SERPL-MCNC: 4.2 G/DL (ref 3.5–5)
ALP SERPL-CCNC: 218 U/L (ref 38–126)
ALP SERPL-CCNC: 235 U/L (ref 38–126)
ALT SERPL-CCNC: 56 U/L (ref 9–52)
ALT SERPL-CCNC: 57 U/L (ref 9–52)
ANION GAP SERPL CALC-SCNC: 7 MMOL/L
ANION GAP SERPL CALC-SCNC: 8 MMOL/L
APTT BLD: 22 SEC (ref 22–30)
AST SERPL-CCNC: 76 U/L (ref 14–36)
AST SERPL-CCNC: 88 U/L (ref 14–36)
BASOPHILS # BLD MANUAL: 0.09 K/UL (ref 0–0.2)
BUN SERPL-SCNC: 17 MG/DL (ref 7–17)
BUN SERPL-SCNC: 20 MG/DL (ref 7–17)
CALCIUM SPEC-MCNC: 14.7 MG/DL (ref 8.4–10.2)
CALCIUM SPEC-MCNC: 14.9 MG/DL (ref 8.4–10.2)
CELLS COUNTED: 200
CHLORIDE SERPL-SCNC: 101 MMOL/L (ref 98–107)
CHLORIDE SERPL-SCNC: 102 MMOL/L (ref 98–107)
CO2 SERPL-SCNC: 28 MMOL/L (ref 22–30)
CO2 SERPL-SCNC: 32 MMOL/L (ref 22–30)
EOSINOPHIL # BLD MANUAL: 0.09 K/UL (ref 0–0.7)
ERYTHROCYTE [DISTWIDTH] IN BLOOD BY AUTOMATED COUNT: 3.86 M/UL (ref 3.8–5.4)
ERYTHROCYTE [DISTWIDTH] IN BLOOD: 16.5 % (ref 11.5–15.5)
GLUCOSE SERPL-MCNC: 108 MG/DL (ref 74–99)
GLUCOSE SERPL-MCNC: 113 MG/DL (ref 74–99)
HCT VFR BLD AUTO: 35.9 % (ref 34–46)
HGB BLD-MCNC: 12 GM/DL (ref 11.4–16)
INR PPP: 1 (ref ?–1.2)
LYMPHOCYTES # BLD MANUAL: 0.5 K/UL (ref 1–4.8)
MAGNESIUM SPEC-SCNC: 2.1 MG/DL (ref 1.6–2.3)
MAGNESIUM SPEC-SCNC: 2.2 MG/DL (ref 1.6–2.3)
MCH RBC QN AUTO: 31.2 PG (ref 25–35)
MCHC RBC AUTO-ENTMCNC: 33.5 G/DL (ref 31–37)
MCV RBC AUTO: 93 FL (ref 80–100)
METAMYELOCYTES # BLD: 0.09 K/UL
MONOCYTES # BLD MANUAL: 0.36 K/UL (ref 0–1)
MYELOCYTES # BLD MANUAL: 0.27 K/UL
NEUTROPHILS NFR BLD MANUAL: 66 %
NEUTS SEG # BLD MANUAL: 3.1 K/UL (ref 1.3–7.7)
PLATELET # BLD AUTO: 213 K/UL (ref 150–450)
POTASSIUM SERPL-SCNC: 4 MMOL/L (ref 3.5–5.1)
POTASSIUM SERPL-SCNC: 4.4 MMOL/L (ref 3.5–5.1)
PROT SERPL-MCNC: 6.7 G/DL (ref 6.3–8.2)
PROT SERPL-MCNC: 7.5 G/DL (ref 6.3–8.2)
PT BLD: 10.4 SEC (ref 9–12)
SODIUM SERPL-SCNC: 138 MMOL/L (ref 137–145)
SODIUM SERPL-SCNC: 140 MMOL/L (ref 137–145)
WBC # BLD AUTO: 4.5 K/UL (ref 3.8–10.6)

## 2019-11-18 PROCEDURE — 85730 THROMBOPLASTIN TIME PARTIAL: CPT

## 2019-11-18 PROCEDURE — 80048 BASIC METABOLIC PNL TOTAL CA: CPT

## 2019-11-18 PROCEDURE — 83735 ASSAY OF MAGNESIUM: CPT

## 2019-11-18 PROCEDURE — 82330 ASSAY OF CALCIUM: CPT

## 2019-11-18 PROCEDURE — 99285 EMERGENCY DEPT VISIT HI MDM: CPT

## 2019-11-18 PROCEDURE — 71275 CT ANGIOGRAPHY CHEST: CPT

## 2019-11-18 PROCEDURE — 85379 FIBRIN DEGRADATION QUANT: CPT

## 2019-11-18 PROCEDURE — 86335 IMMUNFIX E-PHORSIS/URINE/CSF: CPT

## 2019-11-18 PROCEDURE — 80053 COMPREHEN METABOLIC PANEL: CPT

## 2019-11-18 PROCEDURE — 82164 ANGIOTENSIN I ENZYME TEST: CPT

## 2019-11-18 PROCEDURE — 96374 THER/PROPH/DIAG INJ IV PUSH: CPT

## 2019-11-18 PROCEDURE — 83690 ASSAY OF LIPASE: CPT

## 2019-11-18 PROCEDURE — 36415 COLL VENOUS BLD VENIPUNCTURE: CPT

## 2019-11-18 PROCEDURE — 84165 PROTEIN E-PHORESIS SERUM: CPT

## 2019-11-18 PROCEDURE — 96375 TX/PRO/DX INJ NEW DRUG ADDON: CPT

## 2019-11-18 PROCEDURE — 85025 COMPLETE CBC W/AUTO DIFF WBC: CPT

## 2019-11-18 PROCEDURE — 94760 N-INVAS EAR/PLS OXIMETRY 1: CPT

## 2019-11-18 PROCEDURE — 83970 ASSAY OF PARATHORMONE: CPT

## 2019-11-18 PROCEDURE — 82306 VITAMIN D 25 HYDROXY: CPT

## 2019-11-18 PROCEDURE — 84484 ASSAY OF TROPONIN QUANT: CPT

## 2019-11-18 PROCEDURE — 85610 PROTHROMBIN TIME: CPT

## 2019-11-18 PROCEDURE — 82652 VIT D 1 25-DIHYDROXY: CPT

## 2019-11-18 PROCEDURE — 86334 IMMUNOFIX E-PHORESIS SERUM: CPT

## 2019-11-18 PROCEDURE — 93005 ELECTROCARDIOGRAM TRACING: CPT

## 2019-11-18 PROCEDURE — 93306 TTE W/DOPPLER COMPLETE: CPT

## 2019-11-18 PROCEDURE — 96361 HYDRATE IV INFUSION ADD-ON: CPT

## 2019-11-18 RX ADMIN — ACETAMINOPHEN SCH: 500 TABLET ORAL at 22:38

## 2019-11-18 RX ADMIN — RIVAROXABAN SCH MG: 20 TABLET, FILM COATED ORAL at 20:33

## 2019-11-18 RX ADMIN — ONDANSETRON PRN MG: 2 INJECTION INTRAMUSCULAR; INTRAVENOUS at 21:40

## 2019-11-18 RX ADMIN — DOCUSATE SODIUM SCH MG: 50 LIQUID ORAL at 20:42

## 2019-11-18 RX ADMIN — HYDROMORPHONE HYDROCHLORIDE PRN MG: 1 INJECTION, SOLUTION INTRAMUSCULAR; INTRAVENOUS; SUBCUTANEOUS at 21:40

## 2019-11-18 RX ADMIN — CALCITONIN SALMON SCH UNIT: 200 INJECTION, SOLUTION INTRAMUSCULAR; SUBCUTANEOUS at 20:48

## 2019-11-18 RX ADMIN — METOPROLOL SUCCINATE SCH MG: 50 TABLET, EXTENDED RELEASE ORAL at 20:33

## 2019-11-18 RX ADMIN — SENNOSIDES SCH MG: 8.6 TABLET, FILM COATED ORAL at 20:46

## 2019-11-18 RX ADMIN — METHOCARBAMOL PRN MG: 750 TABLET ORAL at 20:46

## 2019-11-18 NOTE — ED
General Adult HPI





- General


Chief complaint: Recheck/Abnormal Lab/Rx


Stated complaint: ABNORMAL LABS, CALCIUM HIGH


Source: patient, RN notes reviewed, old records reviewed


Mode of arrival: ambulatory


Limitations: no limitations





- History of Present Illness


Initial comments: 





This a 54-year-old female presents emergency department with past history 

significant for breast cancer with metastatic disease to the bone.  Patient 

states she's been having some tingling sensation to her face some nausea and 

just generalized bone pain including in her skull.  Patient states she was michael

led today to come to the emergency department because her calcium was elevated. 

Patient denies any fever or chills.  Patient denies any chest pain or 

palpitation.  Patient denies any difficulty breathing shortness of breath.  

Patient denies any headache patient denies any new numbness or weakness.





- Related Data


                                Home Medications











 Medication  Instructions  Recorded  Confirmed


 


Calcium Carbonate [Tums] 500 - 1,000 mg PO QID PRN 02/18/18 11/18/19


 


Ergocalciferol [Vitamin D2] 50,000 unit PO MACHADO 02/18/18 11/18/19


 


Levothyroxine Sodium [Synthroid] 175 mcg PO DAILY@0500 02/18/18 11/18/19


 


Loratadine 10 mg PO DAILY 02/18/18 11/18/19


 


Omeprazole 20 mg PO DAILY 02/18/18 11/18/19


 


ALPRAZolam [Xanax] 0.5 mg PO QID PRN 12/04/18 11/18/19


 


Acetaminophen Tab [Tylenol Tab] 1,000 mg PO Q8H 11/18/19 11/18/19


 


Colace   50 mg PO BID 11/18/19 11/18/19


 


Diclofenac Sodium [Voltaren Gel] 2 gram TOPICAL QID PRN 11/18/19 11/18/19


 


HYDROmorphone HCL 2 mg PO Q6H PRN 11/18/19 11/18/19


 


Methocarbamol [Robaxin] 750 mg PO Q6H PRN 11/18/19 11/18/19


 


Metoprolol Succinate (ER) [Toprol 25 mg PO DAILY 11/18/19 11/18/19





Xl]   


 


Metoprolol Succinate (ER) [Toprol 50 mg PO HS 11/18/19 11/18/19





Xl]   


 


Morphine Sulfate ER [Ms Contin] 30 mg PO Q12HR 11/18/19 11/18/19


 


Polyethylene Glycol 3350 [Miralax] 17 gm PO DAILY 11/18/19 11/18/19


 


Potassium Chloride [Klor-Con 20] 20 meq PO DAILY 11/18/19 11/18/19


 


Rivaroxaban [Xarelto] 20 mg PO W/SUPPER 11/18/19 11/18/19


 


Sennosides [Senna] 8.6 mg PO BID 11/18/19 11/18/19


 


oxyCODONE HCL [Roxicodone] 10 mg PO Q4H PRN 11/18/19 11/18/19











                                    Allergies











Allergy/AdvReac Type Severity Reaction Status Date / Time


 


gabapentin Allergy  Rash/Hives Verified 11/18/19 14:17


 


vancomycin Allergy  Rash/Hives Verified 11/18/19 14:17














Review of Systems


ROS Statement: 


Those systems with pertinent positive or pertinent negative responses have been 

documented in the HPI.





ROS Other: All systems not noted in ROS Statement are negative.





Past Medical History


Past Medical History: Atrial Fibrillation, Cancer, Fibromyalgia, GERD/Reflux


Additional Past Medical History / Comment(s): breast and thyroid cancer , 

chronic back pain, diverticulitis


History of Any Multi-Drug Resistant Organisms: None Reported


Past Surgical History: Ablation, Back Surgery, Cholecystectomy, Hysterectomy


Additional Past Surgical History / Comment(s): lumpectomy left breast, lobectomy

 thyroid, c5 c6 c7 fusion, bone marrow biopsy


Past Psychological History: No Psychological Hx Reported


Smoking Status: Never smoker


Past Alcohol Use History: None Reported


Past Drug Use History: None Reported





General Exam





- General Exam Comments


Initial Comments: 





GENERAL:


Patient is well-developed and well-nourished.  Patient is nontoxic and well-

hydrated and is in mild distress.





ENT:


Neck is soft and supple.  No significant lymphadenopathy is noted.  Oropharynx 

is clear.  Moist mucous membranes.  Neck has full range of motion without 

eliciting any pain.  





EYES:


The sclera were anicteric and conjunctiva were pink and moist.  Extraocular 

movements were intact and pupils were equal round and reactive to light.  

Eyelids were unremarkable.





PULMONARY:


Unlabored respirations.  Good breath sounds bilaterally.  No audible rales 

rhonchi or wheezing was noted.





CARDIOVASCULAR:


There is a regular rate and rhythm without any murmurs gallops or rubs.  





ABDOMEN:


Soft and nontender with normal bowel sounds.  No palpable organomegaly was 

noted.  There is no palpable pulsatile mass.





SKIN:


Skin is clear with no lesions or rashes and otherwise unremarkable.





NEUROLOGIC:


Patient is alert and oriented x3.  Cranial nerves II through XII are grossly 

intact.  Motor and sensory are also intact.  Normal speech, volume and content. 

 Symmetrical smile.  





MUSCULOSKELETAL:


Normal extremities with adequate strength and full range of motion.  Patient is 

tender just about anything she touch her she states that is her baseline since 

she's had pelvis bone metastases.





LYMPHATICS:


No significant lymphadenopathy is noted





PSYCHIATRIC:


Normal psychiatric evaluation. 


Limitations: no limitations





Course


                                   Vital Signs











  11/18/19 11/18/19





  12:23 14:46


 


Temperature 97.6 F 


 


Pulse Rate 89 82


 


Respiratory 18 16





Rate  


 


Blood Pressure 101/65 109/80


 


O2 Sat by Pulse 95 89 L





Oximetry  














Medical Decision Making





- Medical Decision Making





EKG shows normal sinus rhythm at 84 bpm OK interval 270 QRS is 88 QT interval 

344 QTC is 406 per patient's EKG shows no ST segment elevation or depression or 

T wave abnormalities are noted.





Patient was hypercalcemic the level of 14.9.  Fluids as well as Zometa.





I spoke with Dr. Roberto she agreed to admit the patient admitted the patient I 

spoke with Dr. Carr he agreed to see the patient on consult I also consult 

nephrology.





I wrote admitting orders.





- Lab Data


Result diagrams: 


                                 11/18/19 12:56





                                 11/18/19 12:56


                                   Lab Results











  11/18/19 11/18/19 11/18/19 Range/Units





  12:56 12:56 12:56 


 


WBC  4.5    (3.8-10.6)  k/uL


 


RBC  3.86    (3.80-5.40)  m/uL


 


Hgb  12.0    (11.4-16.0)  gm/dL


 


Hct  35.9    (34.0-46.0)  %


 


MCV  93.0    (80.0-100.0)  fL


 


MCH  31.2    (25.0-35.0)  pg


 


MCHC  33.5    (31.0-37.0)  g/dL


 


RDW  16.5 H    (11.5-15.5)  %


 


Plt Count  213    (150-450)  k/uL


 


Neutrophils %  Not Reportable    


 


Neutrophils % (Manual)  66    %


 


Band Neutrophils %  5    %


 


Lymphocytes %  Not Reportable    


 


Lymphocytes % (Manual)  11    %


 


Monocytes %  Not Reportable    


 


Monocytes % (Manual)  8    %


 


Eosinophils %  Not Reportable    


 


Eosinophils % (Manual)  2    %


 


Basophils %  Not Reportable    


 


Basophils % (Manual)  2    %


 


Metamyelocytes %  2    %


 


Myelocytes %  6    %


 


Neutrophils #  Not Reportable    


 


Neutrophils # (Manual)  3.10    (1.3-7.7)  k/uL


 


Lymphocytes #  Not Reportable    


 


Lymphocytes # (Manual)  0.50 L    (1.0-4.8)  k/uL


 


Monocytes #  Not Reportable    


 


Monocytes # (Manual)  0.36    (0-1.0)  k/uL


 


Eosinophils #  Not Reportable    


 


Eosinophils # (Manual)  0.09    (0-0.7)  k/uL


 


Basophils #  Not Reportable    


 


Basophils # (Manual)  0.09    (0-0.2)  k/uL


 


Metamyelocytes # (Man)  0.09 H    (0)  k/uL


 


Myelocytes # (Manual)  0.27 H    (0)  k/uL


 


Nucleated RBCs  6 H    (0-0)  /100 WBC


 


Manual Slide Review  Performed    


 


Poikilocytosis  Slight    


 


Anisocytosis  Slight    


 


PT    10.4  (9.0-12.0)  sec


 


INR    1.0  (<1.2)  


 


APTT    22.0  (22.0-30.0)  sec


 


Sodium   138   (137-145)  mmol/L


 


Potassium   4.4   (3.5-5.1)  mmol/L


 


Chloride   102   ()  mmol/L


 


Carbon Dioxide   28   (22-30)  mmol/L


 


Anion Gap   8   mmol/L


 


BUN   20 H   (7-17)  mg/dL


 


Creatinine   0.73   (0.52-1.04)  mg/dL


 


Est GFR (CKD-EPI)AfAm   >90   (>60 ml/min/1.73 sqM)  


 


Est GFR (CKD-EPI)NonAf   >90   (>60 ml/min/1.73 sqM)  


 


Glucose   108 H   (74-99)  mg/dL


 


Calcium   14.9 H*   (8.4-10.2)  mg/dL


 


Ionized Calcium Tiffany     (4.5-5.3)  mg/dL


 


Magnesium   2.2   (1.6-2.3)  mg/dL


 


Total Bilirubin   0.5   (0.2-1.3)  mg/dL


 


AST   88 H   (14-36)  U/L


 


ALT   57 H   (9-52)  U/L


 


Alkaline Phosphatase   235 H   ()  U/L


 


Troponin I     (0.000-0.034)  ng/mL


 


Total Protein   7.5   (6.3-8.2)  g/dL


 


Albumin   4.2   (3.5-5.0)  g/dL














  11/18/19 11/18/19 Range/Units





  12:56 14:42 


 


WBC    (3.8-10.6)  k/uL


 


RBC    (3.80-5.40)  m/uL


 


Hgb    (11.4-16.0)  gm/dL


 


Hct    (34.0-46.0)  %


 


MCV    (80.0-100.0)  fL


 


MCH    (25.0-35.0)  pg


 


MCHC    (31.0-37.0)  g/dL


 


RDW    (11.5-15.5)  %


 


Plt Count    (150-450)  k/uL


 


Neutrophils %    


 


Neutrophils % (Manual)    %


 


Band Neutrophils %    %


 


Lymphocytes %    


 


Lymphocytes % (Manual)    %


 


Monocytes %    


 


Monocytes % (Manual)    %


 


Eosinophils %    


 


Eosinophils % (Manual)    %


 


Basophils %    


 


Basophils % (Manual)    %


 


Metamyelocytes %    %


 


Myelocytes %    %


 


Neutrophils #    


 


Neutrophils # (Manual)    (1.3-7.7)  k/uL


 


Lymphocytes #    


 


Lymphocytes # (Manual)    (1.0-4.8)  k/uL


 


Monocytes #    


 


Monocytes # (Manual)    (0-1.0)  k/uL


 


Eosinophils #    


 


Eosinophils # (Manual)    (0-0.7)  k/uL


 


Basophils #    


 


Basophils # (Manual)    (0-0.2)  k/uL


 


Metamyelocytes # (Man)    (0)  k/uL


 


Myelocytes # (Manual)    (0)  k/uL


 


Nucleated RBCs    (0-0)  /100 WBC


 


Manual Slide Review    


 


Poikilocytosis    


 


Anisocytosis    


 


PT    (9.0-12.0)  sec


 


INR    (<1.2)  


 


APTT    (22.0-30.0)  sec


 


Sodium    (137-145)  mmol/L


 


Potassium    (3.5-5.1)  mmol/L


 


Chloride    ()  mmol/L


 


Carbon Dioxide    (22-30)  mmol/L


 


Anion Gap    mmol/L


 


BUN    (7-17)  mg/dL


 


Creatinine    (0.52-1.04)  mg/dL


 


Est GFR (CKD-EPI)AfAm    (>60 ml/min/1.73 sqM)  


 


Est GFR (CKD-EPI)NonAf    (>60 ml/min/1.73 sqM)  


 


Glucose    (74-99)  mg/dL


 


Calcium    (8.4-10.2)  mg/dL


 


Ionized Calcium Tiffany   8.0 H*  (4.5-5.3)  mg/dL


 


Magnesium    (1.6-2.3)  mg/dL


 


Total Bilirubin    (0.2-1.3)  mg/dL


 


AST    (14-36)  U/L


 


ALT    (9-52)  U/L


 


Alkaline Phosphatase    ()  U/L


 


Troponin I  <0.012   (0.000-0.034)  ng/mL


 


Total Protein    (6.3-8.2)  g/dL


 


Albumin    (3.5-5.0)  g/dL














Critical Care Time


Critical Care Time: Yes


Total Critical Care Time: 35





Disposition


Clinical Impression: 


 Metastatic breast cancer, Hypercalcemia





Disposition: ADMITTED AS IP TO THIS [facility]


Referrals: 


Jhony Mercado MD [Primary Care Provider] - 1-2 days


Time of Disposition: 15:41

## 2019-11-19 LAB
1,25(OH)2D3 SERPL-MCNC: 18 PG/ML (ref 20–79)
ACE SERPL-CCNC: 67 U/L (ref 8–52)
ALBUMIN SERPL ELPH-MCNC: 3.14 G/DL (ref 3.8–4.9)
ALBUMIN SERPL-MCNC: 3.7 G/DL (ref 3.5–5)
ALP SERPL-CCNC: 224 U/L (ref 38–126)
ALT SERPL-CCNC: 55 U/L (ref 9–52)
ANION GAP SERPL CALC-SCNC: 7 MMOL/L
AST SERPL-CCNC: 68 U/L (ref 14–36)
BUN SERPL-SCNC: 13 MG/DL (ref 7–17)
CALCIUM SPEC-MCNC: 11.2 MG/DL (ref 8.4–10.2)
CELLS COUNTED: 200
CHLORIDE SERPL-SCNC: 104 MMOL/L (ref 98–107)
CO2 SERPL-SCNC: 29 MMOL/L (ref 22–30)
EOSINOPHIL # BLD MANUAL: 0.04 K/UL (ref 0–0.7)
ERYTHROCYTE [DISTWIDTH] IN BLOOD BY AUTOMATED COUNT: 3.46 M/UL (ref 3.8–5.4)
ERYTHROCYTE [DISTWIDTH] IN BLOOD: 16.8 % (ref 11.5–15.5)
GAMMA GLOB SERPL ELPH-MCNC: 0.7 G/DL (ref 0.7–1.5)
GLUCOSE SERPL-MCNC: 117 MG/DL (ref 74–99)
HCT VFR BLD AUTO: 32.6 % (ref 34–46)
HGB BLD-MCNC: 10.9 GM/DL (ref 11.4–16)
LYMPHOCYTES # BLD MANUAL: 0.25 K/UL (ref 1–4.8)
MCH RBC QN AUTO: 31.3 PG (ref 25–35)
MCHC RBC AUTO-ENTMCNC: 33.3 G/DL (ref 31–37)
MCV RBC AUTO: 94 FL (ref 80–100)
METAMYELOCYTES # BLD: 0.04 K/UL
MONOCYTES # BLD MANUAL: 0.13 K/UL (ref 0–1)
MYELOCYTES # BLD MANUAL: 0.08 K/UL
NEUTROPHILS NFR BLD MANUAL: 83 %
NEUTS SEG # BLD MANUAL: 3.6 K/UL (ref 1.3–7.7)
PLATELET # BLD AUTO: 184 K/UL (ref 150–450)
POTASSIUM SERPL-SCNC: 4.1 MMOL/L (ref 3.5–5.1)
PROT SERPL-MCNC: 6.6 G/DL (ref 6.3–8.2)
SODIUM SERPL-SCNC: 140 MMOL/L (ref 137–145)
WBC # BLD AUTO: 4.2 K/UL (ref 3.8–10.6)

## 2019-11-19 RX ADMIN — DOCUSATE SODIUM SCH: 50 LIQUID ORAL at 21:24

## 2019-11-19 RX ADMIN — ONDANSETRON PRN MG: 2 INJECTION INTRAMUSCULAR; INTRAVENOUS at 09:24

## 2019-11-19 RX ADMIN — POTASSIUM CHLORIDE SCH MEQ: 20 TABLET, EXTENDED RELEASE ORAL at 08:49

## 2019-11-19 RX ADMIN — LORATADINE SCH MG: 10 TABLET ORAL at 08:49

## 2019-11-19 RX ADMIN — LEVOTHYROXINE SODIUM SCH MCG: 75 TABLET ORAL at 06:10

## 2019-11-19 RX ADMIN — DOCUSATE SODIUM SCH: 50 LIQUID ORAL at 08:55

## 2019-11-19 RX ADMIN — HYDROMORPHONE HYDROCHLORIDE PRN MG: 1 INJECTION, SOLUTION INTRAMUSCULAR; INTRAVENOUS; SUBCUTANEOUS at 10:41

## 2019-11-19 RX ADMIN — METHOCARBAMOL PRN MG: 750 TABLET ORAL at 13:02

## 2019-11-19 RX ADMIN — SENNOSIDES SCH MG: 8.6 TABLET, FILM COATED ORAL at 08:49

## 2019-11-19 RX ADMIN — CEFAZOLIN SCH MLS/HR: 330 INJECTION, POWDER, FOR SOLUTION INTRAMUSCULAR; INTRAVENOUS at 08:54

## 2019-11-19 RX ADMIN — HYDROMORPHONE HYDROCHLORIDE PRN MG: 1 INJECTION, SOLUTION INTRAMUSCULAR; INTRAVENOUS; SUBCUTANEOUS at 22:50

## 2019-11-19 RX ADMIN — RIVAROXABAN SCH MG: 20 TABLET, FILM COATED ORAL at 17:46

## 2019-11-19 RX ADMIN — ACETAMINOPHEN SCH MG: 500 TABLET ORAL at 06:10

## 2019-11-19 RX ADMIN — METOPROLOL SUCCINATE SCH MG: 50 TABLET, EXTENDED RELEASE ORAL at 21:21

## 2019-11-19 RX ADMIN — CALCITONIN SALMON SCH UNIT: 200 INJECTION, SOLUTION INTRAMUSCULAR; SUBCUTANEOUS at 21:21

## 2019-11-19 RX ADMIN — OXYCODONE HYDROCHLORIDE SCH MG: 10 TABLET, FILM COATED, EXTENDED RELEASE ORAL at 21:16

## 2019-11-19 RX ADMIN — ACETAMINOPHEN SCH MG: 500 TABLET ORAL at 21:19

## 2019-11-19 RX ADMIN — CALCITONIN SALMON SCH UNIT: 200 INJECTION, SOLUTION INTRAMUSCULAR; SUBCUTANEOUS at 09:43

## 2019-11-19 RX ADMIN — SENNOSIDES SCH MG: 8.6 TABLET, FILM COATED ORAL at 21:18

## 2019-11-19 RX ADMIN — LEVOTHYROXINE SODIUM SCH MCG: 100 TABLET ORAL at 06:10

## 2019-11-19 RX ADMIN — ONDANSETRON PRN MG: 2 INJECTION INTRAMUSCULAR; INTRAVENOUS at 22:59

## 2019-11-19 RX ADMIN — METOPROLOL SUCCINATE SCH MG: 25 TABLET, EXTENDED RELEASE ORAL at 08:57

## 2019-11-19 RX ADMIN — POLYETHYLENE GLYCOL 3350 SCH GM: 17 POWDER, FOR SOLUTION ORAL at 08:49

## 2019-11-19 RX ADMIN — CEFAZOLIN SCH MLS/HR: 330 INJECTION, POWDER, FOR SOLUTION INTRAMUSCULAR; INTRAVENOUS at 14:55

## 2019-11-19 RX ADMIN — ACETAMINOPHEN SCH MG: 500 TABLET ORAL at 13:03

## 2019-11-19 RX ADMIN — HYDROMORPHONE HYDROCHLORIDE PRN MG: 1 INJECTION, SOLUTION INTRAMUSCULAR; INTRAVENOUS; SUBCUTANEOUS at 05:00

## 2019-11-19 RX ADMIN — METHOCARBAMOL PRN MG: 750 TABLET ORAL at 03:30

## 2019-11-19 NOTE — P.CONS
History of Present Illness





- Reason for Consult


Consult date: 19


hypercalcemia, met breast cancer


Requesting physician: Sin Ennis





- Chief Complaint


abn lab, MS pain, weakness





- History of Present Illness


Ms. Radford is a very pleasant  female pt diagnosed with left breast cancer

in 2013, treated with lumpectomy and sentinel nodes biopsy, pathology 

revealed T2N2a disease (7/ nodes were positive), ER/MS were strongly positive,

HER2/GILLIAN negative, she had adjuvant chemotherapy with AC-T, tamoxifen, had 

adjuvant radiation therapy completed in 2014.  She did well until until 

2019 (she was still on tamoxifen at that time) when she presented with 

significant back pain, MRI of her spine revealed multiple osseous metastasis in 

multiple areas in her spine.  19 she had cervical disectomy/fusion of C5-C&

and corpectomy of C6, on 19, she had right humerous currtage,on 19,she 

had kyphoplasty of L3, 10/8/19, kypholasty of T12,L1,L5,S1, also had palliative 

XRT.  The pathology from her surgeries were positive for metastatic carcinoma 

consistent with breast, HER2/GILLIAN negative, ER was 35%, MS was 1%. She was 

started on faslodex and ibrance in 2019.  She only had one cycle of 

ibrance, ended on 10/17/19, she had neutropenia, weakness, multiple admissions 

to [facility] and prolonged rehab.  PET  revealed diffuse osseous metastasis,

no visceral disease. She came to office to establish care with Dr. Carr, she 

was having c/o weakness, very tired, no appetite, lost significant weight, gen

eralized bone pain in back and ribs, constipation, exertional dyspnea, RLS 

worse, denied nausea, vomiting, headaches.  On lab work she had a Ca++ >14 and 

was called to go to ER.  She is a little better today then yesterday on admit.  





Review of Systems





14 point ROS is negative except as stated in HPI 





Past Medical History


Past Medical History: Atrial Fibrillation, Cancer, Fibromyalgia, GERD/Reflux


Additional Past Medical History / Comment(s): breast cancer status post 

lumpectomy in  with recurrence of invasive ductal carcinoma stage III grade 

IIIa with lumpectomy 2014 with bone metastasis and multiple pathologic 

compression fractures.  Status post cervical fusion, right femur nail and t

itanium david all positive for cancer in 2019, kyphoplasty L3 followed by 

kyphoplasty T12, L1, L5-S1 with radiofrequency ablation 2019, history of

thyroid cancer status post lobectomy and radiation therapy, chronic atrial 

fibrillation status post ablation on Xarelto, hypothyroidism, chronic pain 

syndrome, gastroesophageal reflux disease, generalized anxiety disorder.


History of Any Multi-Drug Resistant Organisms: None Reported


Past Surgical History: Ablation, Back Surgery, Cholecystectomy, Hysterectomy


Additional Past Surgical History / Comment(s): lumpectomy left breast x3, 

lobectomy thyroid, c5 c6 c7 fusion, bone marrow biopsy, bar/nail in right thigh 

and plate in right arm, ablation on heart , gallstone removed from bile 

duct, left breast re-incision for clear margins in ,port plcement, 2015 had 

port removed, kyphoplasty on back x3 and radiofrequncy ablation to back,


Past Anesthesia/Blood Transfusion Reactions: Previous Problems w/ Anesthesia


Additional Past Anesthesia/Blood Transfusion Reaction / Comm: Hard time waking 

up and hypothermia


Past Psychological History: No Psychological Hx Reported


Additional Psychological History / Comment(s): Patient lives in ranch style home

with .


Smoking Status: Never smoker


Past Alcohol Use History: None Reported


Additional Past Alcohol Use History / Comment(s): Patient is a lifelong 

nonsmoker, no marijuana or illicit drug use.


Past Drug Use History: None Reported





- Past Family History


  ** Son(s)


History Unknown: Yes


Additional Family Medical History / Comment(s): Small nodule on thyroid, no 

other history





  ** Father


Additional Family Medical History / Comment(s): Patient does not know any 

history on her father.





  ** Mother


Additional Family Medical History / Comment(s): Mother  at age 83 from a pe

rforated bowel with fistula to the bladder, COPD history.





  ** Brother(s)


Additional Family Medical History / Comment(s): Patient has 1 brother that had 

part of his colon removed.  Patient does not have any sisters.





Medications and Allergies


                                Home Medications











 Medication  Instructions  Recorded  Confirmed  Type


 


Calcium Carbonate [Tums] 500 - 1,000 mg PO QID PRN 18 History


 


Ergocalciferol [Vitamin D2] 50,000 unit PO MACHADO 18 History


 


Levothyroxine Sodium [Synthroid] 175 mcg PO DAILY@0500 02/18/18 11/18/19 History


 


Loratadine 10 mg PO DAILY 18 History


 


Omeprazole 20 mg PO DAILY 18 History


 


ALPRAZolam [Xanax] 0.5 mg PO QID PRN 18 History


 


Acetaminophen Tab [Tylenol Tab] 1,000 mg PO Q8H 19 History


 


Colace   50 mg PO BID 19 History


 


Diclofenac Sodium [Voltaren Gel] 2 gram TOPICAL QID PRN 19 

History


 


HYDROmorphone HCL 2 mg PO Q6H PRN 19 History


 


Methocarbamol [Robaxin] 750 mg PO Q6H PRN 19 History


 


Metoprolol Succinate (ER) [Toprol 25 mg PO DAILY 19 History





Xl]    


 


Metoprolol Succinate (ER) [Toprol 50 mg PO HS 19 History





Xl]    


 


Morphine Sulfate ER [Ms Contin] 30 mg PO Q12HR 19 History


 


Polyethylene Glycol 3350 [Miralax] 17 gm PO DAILY 19 History


 


Potassium Chloride [Klor-Con 20] 20 meq PO DAILY 19 History


 


Rivaroxaban [Xarelto] 20 mg PO W/SUPPER 19 History


 


Sennosides [Senna] 8.6 mg PO BID 19 History


 


oxyCODONE HCL [Roxicodone] 10 mg PO Q4H PRN 19 History








                                    Allergies











Allergy/AdvReac Type Severity Reaction Status Date / Time


 


gabapentin Allergy  Rash/Hives Verified 19 14:17


 


vancomycin Allergy  Rash/Hives Verified 19 14:17














Physical Exam


Vitals: 


                                   Vital Signs











  Temp Pulse Pulse Resp BP BP Pulse Ox


 


 19 11:52  98.6 F   104 H  12   100/56  93 L


 


 19 08:11  99.2 F   92  14   106/70  95


 


 19 05:00  98.2 F   101 H  16   106/72  93 L


 


 19 20:38  97.8 F   84  19   110/72  95


 


 19 16:35  98 F   86  20   126/72  94 L


 


 19 15:54  98.0 F  87   16  110/68   91 L








                                Intake and Output











 19





 06:59 14:59 22:59


 


Intake Total 1200  


 


Balance 1200  


 


Intake:   


 


  Intake, IV Titration 600  





  Amount   


 


    Sodium Chloride 0.9% 1, 600  





    000 ml @ 75 mls/hr IV .   





    [id number] ONE Rx#:[id number]   


 


  Oral 600  


 


Other:   


 


  Voiding Method Urinal Bedpan 





  Urinal 


 


  # Voids 4  


 


  Weight  78.018 kg 














- Constitutional


General appearance: cooperative, mild distress, obese





- EENT


Eyes: anicteric sclerae, EOMI


ENT: hearing grossly normal, normal oropharynx





- Neck


Neck: no lymphadenopathy





- Respiratory


Respiratory: bilateral: diminished





- Cardiovascular


Rhythm: regular


Heart sounds: normal: S1, S2


Abnormal Heart Sounds: no systolic murmur, no diastolic murmur, no rub, no S3 

Gallop, no S4 Gallop, no click, no other


  ** leg


Peripheral Edema: bilateral: None





- Gastrointestinal


General gastrointestinal: no absent bowel sounds, no decreased bowel sounds, no 

distended, no hepatomegaly, no hyperactive bowel sounds, normal bowel sounds, no

 organomegaly, no rigid, no scaphoid, soft, no splenomegaly, no tenderness, no 

umbilical hernia, no ventral hernia





- Neurologic


Neurologic: CNII-XII intact





- Musculoskeletal


Musculoskeletal: generalized weakness





- Psychiatric


Psychiatric: A&O x's 3, appropriate affect, intact judgment & insight





Results


CBC & Chem 7: 


                                 19 07:40





                                 19 07:40


Labs: 


                  Abnormal Lab Results - Last 24 Hours (Table)











  19 Range/Units





  14:42 17:54 17:54 


 


RBC     (3.80-5.40)  m/uL


 


Hgb     (11.4-16.0)  gm/dL


 


Hct     (34.0-46.0)  %


 


RDW     (11.5-15.5)  %


 


Lymphocytes # (Manual)     (1.0-4.8)  k/uL


 


Metamyelocytes # (Man)     (0)  k/uL


 


Myelocytes # (Manual)     (0)  k/uL


 


Nucleated RBCs     (0-0)  /100 WBC


 


Carbon Dioxide   32 H   (22-30)  mmol/L


 


Glucose   113 H   (74-99)  mg/dL


 


Calcium   14.7 H*   (8.4-10.2)  mg/dL


 


Ionized Calcium Tiffany  8.0 H*    (4.5-5.3)  mg/dL


 


AST   76 H   (14-36)  U/L


 


ALT   56 H   (9-52)  U/L


 


Alkaline Phosphatase   218 H   ()  U/L


 


Total Protein (PEP)     (6.2-8.2)  g/dL


 


Albumin (PEP)     (3.80-4.90)  g/dL


 


Angiotensin Convert Enz    67 H  (8-52)  U/L


 


Vit D 1,25-Dihydroxy    18 L  (20 - 79)  pg/mL


 


PTH Intact     (14.0-72.0)  pg/mL














  19 Range/Units





  17:54 17:54 07:40 


 


RBC    3.46 L  (3.80-5.40)  m/uL


 


Hgb    10.9 L  (11.4-16.0)  gm/dL


 


Hct    32.6 L  (34.0-46.0)  %


 


RDW    16.8 H  (11.5-15.5)  %


 


Lymphocytes # (Manual)    0.25 L  (1.0-4.8)  k/uL


 


Metamyelocytes # (Man)    0.04 H  (0)  k/uL


 


Myelocytes # (Manual)    0.08 H  (0)  k/uL


 


Nucleated RBCs    5 H  (0-0)  /100 WBC


 


Carbon Dioxide     (22-30)  mmol/L


 


Glucose     (74-99)  mg/dL


 


Calcium     (8.4-10.2)  mg/dL


 


Ionized Calcium Tiffany     (4.5-5.3)  mg/dL


 


AST     (14-36)  U/L


 


ALT     (9-52)  U/L


 


Alkaline Phosphatase     ()  U/L


 


Total Protein (PEP)  5.8 L    (6.2-8.2)  g/dL


 


Albumin (PEP)  3.14 L    (3.80-4.90)  g/dL


 


Angiotensin Convert Enz     (8-52)  U/L


 


Vit D 1,25-Dihydroxy     (20 - 79)  pg/mL


 


PTH Intact   3.4 L   (14.0-72.0)  pg/mL














  19 Range/Units





  07:40 


 


RBC   (3.80-5.40)  m/uL


 


Hgb   (11.4-16.0)  gm/dL


 


Hct   (34.0-46.0)  %


 


RDW   (11.5-15.5)  %


 


Lymphocytes # (Manual)   (1.0-4.8)  k/uL


 


Metamyelocytes # (Man)   (0)  k/uL


 


Myelocytes # (Manual)   (0)  k/uL


 


Nucleated RBCs   (0-0)  /100 WBC


 


Carbon Dioxide   (22-30)  mmol/L


 


Glucose  117 H  (74-99)  mg/dL


 


Calcium  11.2 H  (8.4-10.2)  mg/dL


 


Ionized Calcium Tiffany  6.1 H*  (4.5-5.3)  mg/dL


 


AST  68 H  (14-36)  U/L


 


ALT  55 H  (9-52)  U/L


 


Alkaline Phosphatase  224 H  ()  U/L


 


Total Protein (PEP)   (6.2-8.2)  g/dL


 


Albumin (PEP)   (3.80-4.90)  g/dL


 


Angiotensin Convert Enz   (8-52)  U/L


 


Vit D 1,25-Dihydroxy   (20 - 79)  pg/mL


 


PTH Intact   (14.0-72.0)  pg/mL














Assessment and Plan


(1) Hypercalcemia


Narrative/Plan: 


Malignant hypercalcemia.  Improved today with admin of zometa, fluids and c

alcitonin.  Labs in AM 


Current Visit: Yes   Status: Acute   Priority: High   Code(s): E83.52 - HYPE

RCALCEMIA   SNOMED Code(s): 95495734


   





(2) Metastatic breast cancer


Narrative/Plan: 


Plan is for dose reduced ibrance and faslodex.  These orders are in process 

pending approval and receipt of meds.  This will begin outpatient.  Pt knows the

 plan.  


Current Visit: Yes   Status: Acute   Priority: High   Code(s): C50.919 - 

MALIGNANT NEOPLASM OF UNSP SITE OF UNSPECIFIED FEMALE BREAST   SNOMED Code(s): 

688962134

## 2019-11-19 NOTE — P.HPIM
History of Present Illness


H&P Date: 19





This is a 54-year-old  female patient of Valdemar Alvarez NP with past 

medical history of breast cancer status post lumpectomy in  with recurrence 

of invasive ductal carcinoma stage III grade IIIa with lumpectomy 2014 

with bone metastasis and multiple pathologic compression fractures.  Status post

cervical fusion, right femur nail and titanium david all positive for cancer in 

2019, kyphoplasty L3 followed by kyphoplasty T12, L1, L5-S1 with 

radiofrequency ablation 2019, history of thyroid cancer status post 

lobectomy and radiation therapy, chronic atrial fibrillation status post 

ablation on Xarelto, hypothyroidism, chronic pain syndrome, gastroesophageal 

reflux disease, generalized anxiety disorder.  Patient's last chemotherapy was 

performed on 10/16/2019.  Last radiation to the cervical, right femur and lumbar

areas completed on 2019.  PET scan on 2019 revealed osseous 

metastatic disease. Patient has [facility] care in place.  Patient states that

she was at [facility] from  through the  at which 

time she underwent lumbar spine procedures.  She states she also has a left 

scapular fracture and rib fractures.  Old fractures are pathologic related to 

cancer with metastatic disease.  She had previously received all her care at 

[facility] and as of Friday switch to Dr. Carr.  Patient received a call

that she needed to come into the hospital as her calcium was elevated.  Patient 

is complaining of numbness to her face that she has had for a couple weeks along

with nausea and vomiting.  Patient is complaining of significant pain all over 

her body.  Nurse relates that at 3 AM patient became very anxious and agitated. 

Her  normally gives her a Xanax at 3 in the morning for this reason and 

instructions have been provided to do the same year.  Patient says she has also 

become very weak and she needs to build ambulate to the bathroom in order for 

her  to care for her at home.





Patient came into [facility] emergency center for evaluation.  

Calcium was 14.9, ionized calcium 8.0, AST 88, ALT 57, alkaline phosphatase 235,

troponin negative, albumin 4.2, lactulose within normal limits, BUN 20 

creatinine 0.73.  INR 1.0.  WBC 4.5, hemoglobin 12, platelet count 213.  EKG in 

normal sinus rhythm.  Patient was afebrile, blood pressure 101/65, heart rate in

the 80s, pulse ox 95% on room air.  Patient was started on IM calcitonin and 

status post 1 dose of zoledronic acid 4 mg IV.  Patient was admitted to the 

[facility] floor and consults requested with nephrology and oncology. Repeat lab 

work this morning revealed a calcium 11.2 and ionized calcium 6.1, AST 68, ALT 

55, alkaline phosphatase 20 and 24.  Parathyroid hormone intact was 3.4.





Review of Systems


Constitutional: Reports anorexia, Reports fatigue, Reports lethargy, Reports 

malaise, Reports poor appetite, Reports weakness, Denies chills, Denies fever


Eyes: denies blurred vision, denies pain


Ears, nose, mouth and throat: Denies headache, Denies nasal congestion, Denies 

nasal discharge, Denies sore throat, Denies vertigo


Cardiovascular: Reports chest pain, Denies edema, Denies lightheadedness, Denies

palpitations, Denies shortness of breath, Denies syncope


Respiratory: Denies cough, Denies cough with sputum, Denies dyspnea, Denies 

excessive sputum, Denies hemoptysis, Denies home oxygen, Denies wheezing


Gastrointestinal: Reports loss of appetite, Reports nausea, Denies abdominal 

pain, Denies diarrhea, Denies vomiting


Genitourinary: Denies dysuria, Denies hematuria, Denies urgency, Denies urinary 

frequency


Musculoskeletal: Reports gait dysfunction, Reports low back pain, Reports muscle

weakness, Denies myalgias


Integumentary: Denies pruritus, Denies rash


Neurological: Reports weakness, Denies change in mentation, Denies change in 

speech, Denies seizures, Denies syncope


Psychiatric: Reports anxiety, Denies depression


Endocrine: Denies fatigue, Denies weight change





Past Medical History


Past Medical History: Atrial Fibrillation, Cancer, Fibromyalgia, GERD/Reflux


Additional Past Medical History / Comment(s): breast cancer status post 

lumpectomy in  with recurrence of invasive ductal carcinoma stage III grade 

IIIa with lumpectomy 2014 with bone metastasis and multiple pathologic 

compression fractures.  Status post cervical fusion, right femur nail and 

titanium david all positive for cancer in 2019, kyphoplasty L3 followed by 

kyphoplasty T12, L1, L5-S1 with radiofrequency ablation 2019, history of

thyroid cancer status post lobectomy and radiation therapy, chronic atrial 

fibrillation status post ablation on Xarelto, hypothyroidism, chronic pain 

syndrome, gastroesophageal reflux disease, generalized anxiety disorder.


History of Any Multi-Drug Resistant Organisms: None Reported


Past Surgical History: Ablation, Back Surgery, Cholecystectomy, Hysterectomy


Additional Past Surgical History / Comment(s): lumpectomy left breast x3, 

lobectomy thyroid, c5 c6 c7 fusion, bone marrow biopsy, bar/nail in right thigh 

and plate in right arm, ablation on heart 2013, gallstone removed from bile 

duct, left breast re-incision for clear margins in ,port plcement, 2015 had 

port removed, kyphoplasty on back x3 and radiofrequncy ablation to back,


Past Anesthesia/Blood Transfusion Reactions: Previous Problems w/ Anesthesia


Additional Past Anesthesia/Blood Transfusion Reaction / Comment(s): Hard time 

waking up and hypothermia


Past Psychological History: No Psychological Hx Reported


Additional Psychological History / Comment(s): Patient lives in ranch style home

with .


Smoking Status: Never smoker


Past Alcohol Use History: None Reported


Additional Past Alcohol Use History / Comment(s): Patient is a lifelong 

nonsmoker, no marijuana or illicit drug use.


Past Drug Use History: None Reported





- Past Family History


  ** Son(s)


History Unknown: Yes


Additional Family Medical History / Comment(s): Small nodule on thyroid, no 

other history





  ** Father


Additional Family Medical History / Comment(s): Patient does not know any 

history on her father.





  ** Mother


Additional Family Medical History / Comment(s): Mother  at age 83 from a 

perforated bowel with fistula to the bladder, COPD history.





  ** Brother(s)


Additional Family Medical History / Comment(s): Patient has 1 brother that had 

part of his colon removed.  Patient does not have any sisters.





Medications and Allergies


                                Home Medications











 Medication  Instructions  Recorded  Confirmed  Type


 


Calcium Carbonate [Tums] 500 - 1,000 mg PO QID PRN 18 History


 


Ergocalciferol [Vitamin D2] 50,000 unit PO MACHADO 18 History


 


Levothyroxine Sodium [Synthroid] 175 mcg PO DAILY@0500 18 History


 


Loratadine 10 mg PO DAILY 18 History


 


Omeprazole 20 mg PO DAILY 18 History


 


ALPRAZolam [Xanax] 0.5 mg PO QID PRN 18 History


 


Acetaminophen Tab [Tylenol Tab] 1,000 mg PO Q8H 19 History


 


Colace   50 mg PO BID 19 History


 


Diclofenac Sodium [Voltaren Gel] 2 gram TOPICAL QID PRN 19 

History


 


HYDROmorphone HCL 2 mg PO Q6H PRN 19 History


 


Methocarbamol [Robaxin] 750 mg PO Q6H PRN 19 History


 


Metoprolol Succinate (ER) [Toprol 25 mg PO DAILY 19 History





Xl]    


 


Metoprolol Succinate (ER) [Toprol 50 mg PO HS 19 History





Xl]    


 


Morphine Sulfate ER [Ms Contin] 30 mg PO Q12HR 19 History


 


Polyethylene Glycol 3350 [Miralax] 17 gm PO DAILY 19 History


 


Potassium Chloride [Klor-Con 20] 20 meq PO DAILY 19 History


 


Rivaroxaban [Xarelto] 20 mg PO W/SUPPER 19 History


 


Sennosides [Senna] 8.6 mg PO BID 19 History


 


oxyCODONE HCL [Roxicodone] 10 mg PO Q4H PRN 19 History








                                    Allergies











Allergy/AdvReac Type Severity Reaction Status Date / Time


 


gabapentin Allergy  Rash/Hives Verified 19 14:17


 


vancomycin Allergy  Rash/Hives Verified 19 14:17














Physical Exam


Vitals: 


                                   Vital Signs











  Temp Pulse Pulse Resp BP BP Pulse Ox


 


 19 08:11  99.2 F   92  14   106/70  95


 


 19 05:00  98.2 F   101 H  16   106/72  93 L


 


 19 20:38  97.8 F   84  19   110/72  95


 


 19 16:35  98 F   86  20   126/72  94 L


 


 19 15:54  98.0 F  87   16  110/68   91 L


 


 19 14:46   82   16  109/80   89 L


 


 19 12:23  97.6 F  89   18  101/65   95








                                Intake and Output











 19





 22:59 06:59 14:59


 


Intake Total 1140 1200 


 


Balance 1140 1200 


 


Intake:   


 


  Intake, IV Titration 300 600 





  Amount   


 


    Sodium Chloride 0.9% 1, 300 600 





    000 ml @ 75 mls/hr IV .   





    [id number] ONE Rx#:[id number]   


 


  Oral 840 600 


 


Other:   


 


  Voiding Method  Urinal 


 


  # Voids 2 4 

















Gen: This is a 54-year-old  female area patient is resting in bed and 

appears to be comfortable at this time.  Patient has pain with minimal movement 

in bed.


HEENT: Head is atraumatic, normocephalic. Pupils equal, round. Sclerae is 

anicteric.  Oral mucous membranes are dry.


NECK: Supple. No JVD. No lymphadenopathy. No thyromegaly. 


LUNGS: Clear to auscultation. No wheezes or rhonchi.  No intercostal 

retractions.


HEART: Regular rate and rhythm. No murmur. 


ABDOMEN: Soft. Bowel sounds are present. No masses.  No tenderness.


EXTREMITIES: No pedal edema.  No calf tenderness.


NEUROLOGICAL: Patient is awake, alert and oriented x3. Cranial nerves 2 through 

12 are grossly intact. 








Results


CBC & Chem 7: 


                                 19 07:40





                                 19 07:40


Labs: 


                  Abnormal Lab Results - Last 24 Hours (Table)











  19 Range/Units





  12:56 12:56 14:42 


 


RBC     (3.80-5.40)  m/uL


 


Hgb     (11.4-16.0)  gm/dL


 


Hct     (34.0-46.0)  %


 


RDW  16.5 H    (11.5-15.5)  %


 


Lymphocytes # (Manual)  0.50 L    (1.0-4.8)  k/uL


 


Metamyelocytes # (Man)  0.09 H    (0)  k/uL


 


Myelocytes # (Manual)  0.27 H    (0)  k/uL


 


Nucleated RBCs  6 H    (0-0)  /100 WBC


 


Carbon Dioxide     (22-30)  mmol/L


 


BUN   20 H   (7-17)  mg/dL


 


Glucose   108 H   (74-99)  mg/dL


 


Calcium   14.9 H*   (8.4-10.2)  mg/dL


 


Ionized Calcium Tiffany    8.0 H*  (4.5-5.3)  mg/dL


 


AST   88 H   (14-36)  U/L


 


ALT   57 H   (9-52)  U/L


 


Alkaline Phosphatase   235 H   ()  U/L


 


Total Protein (PEP)     (6.2-8.2)  g/dL


 


PTH Intact     (14.0-72.0)  pg/mL














  19 Range/Units





  17:54 17:54 17:54 


 


RBC     (3.80-5.40)  m/uL


 


Hgb     (11.4-16.0)  gm/dL


 


Hct     (34.0-46.0)  %


 


RDW     (11.5-15.5)  %


 


Lymphocytes # (Manual)     (1.0-4.8)  k/uL


 


Metamyelocytes # (Man)     (0)  k/uL


 


Myelocytes # (Manual)     (0)  k/uL


 


Nucleated RBCs     (0-0)  /100 WBC


 


Carbon Dioxide  32 H    (22-30)  mmol/L


 


BUN     (7-17)  mg/dL


 


Glucose  113 H    (74-99)  mg/dL


 


Calcium  14.7 H*    (8.4-10.2)  mg/dL


 


Ionized Calcium Tiffany     (4.5-5.3)  mg/dL


 


AST  76 H    (14-36)  U/L


 


ALT  56 H    (9-52)  U/L


 


Alkaline Phosphatase  218 H    ()  U/L


 


Total Protein (PEP)   5.8 L   (6.2-8.2)  g/dL


 


PTH Intact    3.4 L  (14.0-72.0)  pg/mL














  19 Range/Units





  07:40 07:40 


 


RBC  3.46 L   (3.80-5.40)  m/uL


 


Hgb  10.9 L   (11.4-16.0)  gm/dL


 


Hct  32.6 L   (34.0-46.0)  %


 


RDW  16.8 H   (11.5-15.5)  %


 


Lymphocytes # (Manual)  0.25 L   (1.0-4.8)  k/uL


 


Metamyelocytes # (Man)  0.04 H   (0)  k/uL


 


Myelocytes # (Manual)  0.08 H   (0)  k/uL


 


Nucleated RBCs  5 H   (0-0)  /100 WBC


 


Carbon Dioxide    (22-30)  mmol/L


 


BUN    (7-17)  mg/dL


 


Glucose   117 H  (74-99)  mg/dL


 


Calcium   11.2 H  (8.4-10.2)  mg/dL


 


Ionized Calcium Tiffany   6.1 H*  (4.5-5.3)  mg/dL


 


AST   68 H  (14-36)  U/L


 


ALT   55 H  (9-52)  U/L


 


Alkaline Phosphatase   224 H  ()  U/L


 


Total Protein (PEP)    (6.2-8.2)  g/dL


 


PTH Intact    (14.0-72.0)  pg/mL














Thrombosis Risk Factor Assmnt





- DVT/VTE Prophylaxis


DVT/VTE Prophylaxis: Pharmacologic Prophylaxis ordered





- Choose All That Apply


Each Factor Represents 1 point: Age 41-60 years, Obesity (BMI >25)


Other Risk Factors: No


Other congenital or acquired thrombophilia - If yes, enter type in comment: No


Thrombosis Risk Factor Assessment Total Risk Factor Score: 2


Thrombosis Risk Factor Assessment Level: Low Risk





Assessment and Plan


Plan: 





1.  Hypercalcemia secondary to malignancy with metastatic disease to the bone.  

Nephrology consult appreciated.  IV fluids been increased 125 mL per hour, 

continue IM calcitonin, discontinue vitamin D.  Electrophoresis studies in p

lace.





2.  Breast cancer with bone metastasis status post multiple surgical 

interventions.  Oncology consult.





3.  Paroxysmal atrial fibrillation.  Continue Toprol-XL 25 mg in the morning and

 50 mg at bedtime, Xarelto 20 mg daily.





4.  Hypothyroidism.  Continue levothyroxine 175 g daily.





5.  Gastroesophageal reflux disease and GI prophylaxis.  Continue Protonix.





6.  Chronic pain syndrome.  Continue Voltaren gel, Robaxin 750 mg every 6 hours 

as needed, OxyContin 30 mg extended release every 12 hours, oxycodone 10 mg 

every 4 hours as needed.





7.  Seasonal ALLERGIES.  Continue Claritin.





8.  History of thyroid cancer.





9.  Generalized anxiety disorder.  Continue Xanax or 0.5 mg 4 times daily as 

needed.





10.  Elevated liver function tests secondary to liver steatosis.








Patient will be admitted to the hospital for a minimum of 2 night stay.





Discharge plan: Return home with [facility].





Impression and plan of care have been directed as dictated by the signing 

physician.  Kadi Mulligan nurse practitioner acting as scribe for signing 

physician.

## 2019-11-19 NOTE — P.NPCON
History of Present Illness





- Reason for Consult


acute renal failure





- History of Present Illness





Reason for consultation: Hypercalcemia





History of present illness:


Patient is a 54-year-old female seen in renal consultation for hypercalcemia.  

Patient's calcium level on admission was 14.9 and this morning it is down to 

11.2.  Patient has history of breast cancer with bone metastasis.  I started her

on intramuscular calcitonin yesterday.  She also received a dose of Zometa 4 mg 

IV yesterday.  She is currently maintained on normal saline at 75 mL an hour.  

No edema.  Urine output is good.  GFR is at baseline.  Creatinine 0.77 today.  

She is not on any thiazide diuretics.  She denies recent use of Tums or calcium 

supplementation.  Does admit to taking Drisdol once weekly.  She presented to 

the hospital due to nausea as well as generalized pain.  Patient states the pain

is all over her body but especially complains of pain in her head.  No vomiting 

or diarrhea now.  Denies regular use of nonsteroidals.  Denies personal or 

family history of renal disease.  Hemodynamically stable.  No hematuria or 

dysuria.  No chest pain or shortness of breath.





Vital signs are stable.


General: The patient appeared well nourished and normally developed. 


HEENT: Head exam is unremarkable. Neck is without jugular venous distension.


LUNGS: Lungs are clear to auscultation and percussion. Breath sounds decreased.


HEART: Rate and Rhythm are regular. First and second heart sounds normal. No 

murmurs, rubs or gallops. 


ABDOMEN: Abdominal exam reveals normal bowel sounds. Non-tender and non-

distended. No evidence of peritonitis.


EXTREMITITES: No clubbing, cyanosis, or edema.





Past Medical History


Past Medical History: Atrial Fibrillation, Cancer, Fibromyalgia, GERD/Reflux


Additional Past Medical History / Comment(s): Breast with mets to bones- stage 

four and thyroid cancer x2, chronic back pain, diverticulitis, break in L3,


History of Any Multi-Drug Resistant Organisms: None Reported


Past Surgical History: Ablation, Back Surgery, Cholecystectomy, Hysterectomy


Additional Past Surgical History / Comment(s): lumpectomy left breast x3, 

lobectomy thyroid, c5 c6 c7 fusion, bone marrow biopsy, bar/nail in right thigh 

and plate in right arm, ablation on heart 2013, gallstone removed from bile 

duct, left breast re-incision for clear margins in 2014,port plcement, 2015 had 

port removed, kyphoplasty on back x3 and radiofrequncy ablation to back,


Past Anesthesia/Blood Transfusion Reactions: Previous Problems w/ Anesthesia


Additional Past Anesthesia/Blood Transfusion Reaction / Comment(s): Hard time 

waking up and hypothermia


Past Psychological History: No Psychological Hx Reported


Additional Psychological History / Comment(s): Patient lives in ranch style home

with .


Smoking Status: Never smoker


Past Alcohol Use History: None Reported


Past Drug Use History: None Reported





- Past Family History


  ** Son(s)


History Unknown: Yes


Additional Family Medical History / Comment(s): Small nodule on thyroid, no o

ther history





Medications and Allergies


                                Home Medications











 Medication  Instructions  Recorded  Confirmed  Type


 


Calcium Carbonate [Tums] 500 - 1,000 mg PO QID PRN 02/18/18 11/18/19 History


 


Ergocalciferol [Vitamin D2] 50,000 unit PO MACHADO 02/18/18 11/18/19 History


 


Levothyroxine Sodium [Synthroid] 175 mcg PO DAILY@0500 02/18/18 11/18/19 History


 


Loratadine 10 mg PO DAILY 02/18/18 11/18/19 History


 


Omeprazole 20 mg PO DAILY 02/18/18 11/18/19 History


 


ALPRAZolam [Xanax] 0.5 mg PO QID PRN 12/04/18 11/18/19 History


 


Acetaminophen Tab [Tylenol Tab] 1,000 mg PO Q8H 11/18/19 11/18/19 History


 


Colace   50 mg PO BID 11/18/19 11/18/19 History


 


Diclofenac Sodium [Voltaren Gel] 2 gram TOPICAL QID PRN 11/18/19 11/18/19 

History


 


HYDROmorphone HCL 2 mg PO Q6H PRN 11/18/19 11/18/19 History


 


Methocarbamol [Robaxin] 750 mg PO Q6H PRN 11/18/19 11/18/19 History


 


Metoprolol Succinate (ER) [Toprol 25 mg PO DAILY 11/18/19 11/18/19 History





Xl]    


 


Metoprolol Succinate (ER) [Toprol 50 mg PO HS 11/18/19 11/18/19 History





Xl]    


 


Morphine Sulfate ER [Ms Contin] 30 mg PO Q12HR 11/18/19 11/18/19 History


 


Polyethylene Glycol 3350 [Miralax] 17 gm PO DAILY 11/18/19 11/18/19 History


 


Potassium Chloride [Klor-Con 20] 20 meq PO DAILY 11/18/19 11/18/19 History


 


Rivaroxaban [Xarelto] 20 mg PO W/SUPPER 11/18/19 11/18/19 History


 


Sennosides [Senna] 8.6 mg PO BID 11/18/19 11/18/19 History


 


oxyCODONE HCL [Roxicodone] 10 mg PO Q4H PRN 11/18/19 11/18/19 History








                                    Allergies











Allergy/AdvReac Type Severity Reaction Status Date / Time


 


gabapentin Allergy  Rash/Hives Verified 11/18/19 14:17


 


vancomycin Allergy  Rash/Hives Verified 11/18/19 14:17














Physical Exam


Vitals: 


                                   Vital Signs











  Temp Pulse Pulse Resp BP BP Pulse Ox


 


 11/19/19 08:11  99.2 F   92  14   106/70  95


 


 11/19/19 05:00  98.2 F   101 H  16   106/72  93 L


 


 11/18/19 20:38  97.8 F   84  19   110/72  95


 


 11/18/19 16:35  98 F   86  20   126/72  94 L


 


 11/18/19 15:54  98.0 F  87   16  110/68   91 L


 


 11/18/19 14:46   82   16  109/80   89 L


 


 11/18/19 12:23  97.6 F  89   18  101/65   95








                                Intake and Output











 11/18/19 11/19/19 11/19/19





 22:59 06:59 14:59


 


Intake Total 1140 1200 


 


Balance 1140 1200 


 


Intake:   


 


  Intake, IV Titration 300 600 





  Amount   


 


    Sodium Chloride 0.9% 1, 300 600 





    000 ml @ 75 mls/hr IV .   





    [id number] ONE Rx#:[id number]   


 


  Oral 840 600 


 


Other:   


 


  Voiding Method  Urinal 


 


  # Voids 2 4 














Results





- Lab Results


                             Most recent lab results











Calcium  11.2 mg/dL (8.4-10.2)  H  11/19/19  07:40    


 


Magnesium  2.1 mg/dL (1.6-2.3)   11/18/19  17:54    














                                 11/18/19 12:56





                                 11/19/19 07:40





Assessment and Plan


Plan: 





Assessment:


1.  Hypercalcemia, non-parathyroid-induced.  Etiology is malignancy with bone 

metastasis as well as component of volume contraction.  Improving.


2.  Breast cancer with bone metastasis.


3.  Benign hypertension.  Controlled.





Plan:


Increase rate of normal saline to 125 mL an hour.


Maintain intramuscular calcitonin for now.


Status post Zometa on November 18.


Discontinue vitamin D.


Follow-up pending workup including electrophoresis studies for hypercalcemia.


Repeat electrolytes in the morning.





Thank you for the consultation.  I will continue to follow the patient with you 

during her hospital stay.

## 2019-11-20 LAB
ANION GAP SERPL CALC-SCNC: 6 MMOL/L
BUN SERPL-SCNC: 9 MG/DL (ref 7–17)
CALCIUM SPEC-MCNC: 8.6 MG/DL (ref 8.4–10.2)
CHLORIDE SERPL-SCNC: 107 MMOL/L (ref 98–107)
CO2 SERPL-SCNC: 26 MMOL/L (ref 22–30)
GLUCOSE SERPL-MCNC: 91 MG/DL (ref 74–99)
MAGNESIUM SPEC-SCNC: 1.8 MG/DL (ref 1.6–2.3)
POTASSIUM SERPL-SCNC: 3.6 MMOL/L (ref 3.5–5.1)
SODIUM SERPL-SCNC: 139 MMOL/L (ref 137–145)

## 2019-11-20 RX ADMIN — METOPROLOL SUCCINATE SCH MG: 50 TABLET, EXTENDED RELEASE ORAL at 20:45

## 2019-11-20 RX ADMIN — LEVOTHYROXINE SODIUM SCH MCG: 100 TABLET ORAL at 05:23

## 2019-11-20 RX ADMIN — ACETAMINOPHEN SCH MG: 500 TABLET ORAL at 05:23

## 2019-11-20 RX ADMIN — LEVOTHYROXINE SODIUM SCH MCG: 75 TABLET ORAL at 05:24

## 2019-11-20 RX ADMIN — CEFAZOLIN SCH MLS/HR: 330 INJECTION, POWDER, FOR SOLUTION INTRAMUSCULAR; INTRAVENOUS at 16:14

## 2019-11-20 RX ADMIN — SUCRALFATE SCH GM: 1 TABLET ORAL at 15:36

## 2019-11-20 RX ADMIN — METHOCARBAMOL PRN MG: 750 TABLET ORAL at 02:12

## 2019-11-20 RX ADMIN — ASPIRIN SCH: 325 TABLET ORAL at 10:55

## 2019-11-20 RX ADMIN — SUCRALFATE SCH: 1 TABLET ORAL at 18:20

## 2019-11-20 RX ADMIN — METOPROLOL SUCCINATE SCH: 25 TABLET, EXTENDED RELEASE ORAL at 10:56

## 2019-11-20 RX ADMIN — OXYCODONE HYDROCHLORIDE SCH MG: 10 TABLET, FILM COATED, EXTENDED RELEASE ORAL at 09:45

## 2019-11-20 RX ADMIN — ACETAMINOPHEN SCH MG: 500 TABLET ORAL at 20:45

## 2019-11-20 RX ADMIN — SENNOSIDES SCH: 8.6 TABLET, FILM COATED ORAL at 10:56

## 2019-11-20 RX ADMIN — DOCUSATE SODIUM SCH: 50 LIQUID ORAL at 10:55

## 2019-11-20 RX ADMIN — POLYETHYLENE GLYCOL 3350 SCH: 17 POWDER, FOR SOLUTION ORAL at 10:56

## 2019-11-20 RX ADMIN — RIVAROXABAN SCH MG: 20 TABLET, FILM COATED ORAL at 15:36

## 2019-11-20 RX ADMIN — OXYCODONE HYDROCHLORIDE SCH MG: 10 TABLET, FILM COATED, EXTENDED RELEASE ORAL at 20:43

## 2019-11-20 RX ADMIN — CEFAZOLIN SCH: 330 INJECTION, POWDER, FOR SOLUTION INTRAMUSCULAR; INTRAVENOUS at 18:20

## 2019-11-20 RX ADMIN — CEFAZOLIN SCH MLS/HR: 330 INJECTION, POWDER, FOR SOLUTION INTRAMUSCULAR; INTRAVENOUS at 00:20

## 2019-11-20 RX ADMIN — LORATADINE SCH: 10 TABLET ORAL at 10:55

## 2019-11-20 RX ADMIN — POTASSIUM CHLORIDE SCH: 20 TABLET, EXTENDED RELEASE ORAL at 10:58

## 2019-11-20 RX ADMIN — ACETAMINOPHEN SCH MG: 500 TABLET ORAL at 15:37

## 2019-11-20 RX ADMIN — DOCUSATE SODIUM AND SENNOSIDES SCH EACH: 50; 8.6 TABLET ORAL at 20:44

## 2019-11-20 NOTE — P.PN
Subjective


Progress Note Date: 11/20/19





This is a 54-year-old  female patient of Valdemar Alvarez NP with past 

medical history of breast cancer status post lumpectomy in 2011 with recurrence 

of invasive ductal carcinoma stage III grade IIIa with lumpectomy January 2014 

with bone metastasis and multiple pathologic compression fractures.  Status post

cervical fusion, right femur nail and titanium david all positive for cancer in 

June 2019, kyphoplasty L3 followed by kyphoplasty T12, L1, L5-S1 with 

radiofrequency ablation October 2019, history of thyroid cancer status post 

lobectomy and radiation therapy, chronic atrial fibrillation status post 

ablation on Xarelto, hypothyroidism, chronic pain syndrome, gastroesophageal 

reflux disease, generalized anxiety disorder.  Patient's last chemotherapy was 

performed on 10/16/2019.  Last radiation to the cervical, right femur and lumbar

areas completed on 07/26/2019.  PET scan on 11/09/2019 revealed osseous 

metastatic disease. Patient has [facility] care in place.  Patient states that

she was at [facility] from October 24 through the November 5 at which 

time she underwent lumbar spine procedures.  She states she also has a left 

scapular fracture and rib fractures.  Old fractures are pathologic related to 

cancer with metastatic disease.  She had previously received all her care at 

[facility] and as of Friday switch to Dr. Carr.  Patient received a call

that she needed to come into the hospital as her calcium was elevated.  Patient 

is complaining of numbness to her face that she has had for a couple weeks along

with nausea and vomiting.  Patient is complaining of significant pain all over 

her body.  Nurse relates that at 3 AM patient became very anxious and agitated. 

Her  normally gives her a Xanax at 3 in the morning for this reason and 

instructions have been provided to do the same year.  Patient says she has also 

become very weak and she needs to build ambulate to the bathroom in order for 

her  to care for her at home.





Patient came into [facility] emergency center for evaluation.  

Calcium was 14.9, ionized calcium 8.0, AST 88, ALT 57, alkaline phosphatase 235,

troponin negative, albumin 4.2, electrolytes within normal limits, BUN 20 

creatinine 0.73.  INR 1.0.  WBC 4.5, hemoglobin 12, platelet count 213.  EKG in 

normal sinus rhythm.  Patient was afebrile, blood pressure 101/65, heart rate in

the 80s, pulse ox 95% on room air.  Patient was started on IM calcitonin and 

status post 1 dose of zoledronic acid 4 mg IV.  Patient was admitted to the 

[facility] floor and consults requested with nephrology and oncology. Repeat lab 

work this morning revealed a calcium 11.2 and ionized calcium 6.1, AST 68, ALT 

55, alkaline phosphatase 224.  Parathyroid hormone intact was 3.4.





11/20: Repeat calcium this morning is 8.6.  Dr. Ramirez has recommended 

decreasing IV fluids to 75 mL per hour, discontinue calcitonin and follow-up on 

urine immunofixation.  Patient states she continues to have significant nausea. 

She did have an emesis last evening.  She thinks the nausea and vomiting has 

been related to the MS Contin which should be out of her system soon.  She 

denies having any bowel movement.  She does complain of abdominal pain in 

epigastric area.  Carafate ordered as well as a lipase level.  Physical therapy 

added.











Review of Systems


Constitutional: Reports anorexia, Reports fatigue, Reports lethargy, Reports 

malaise, Reports poor appetite, Reports weakness, Denies chills, Denies fever


Eyes: denies blurred vision, denies pain


Ears, nose, mouth and throat: Denies headache, Denies nasal congestion, Denies 

nasal discharge, Denies sore throat, Denies vertigo


Cardiovascular: Reports chest pain, Denies edema, Denies lightheadedness, Denies

palpitations, Denies shortness of breath, Denies syncope


Respiratory: Denies cough, Denies cough with sputum, Denies dyspnea, Denies 

excessive sputum, Denies hemoptysis, Denies home oxygen, Denies wheezing


Gastrointestinal: Reports loss of appetite, Reports nausea, reports abdominal 

pain, Denies diarrhea, reports vomiting


Genitourinary: Denies dysuria, Denies hematuria, Denies urgency, Denies urinary 

frequency


Musculoskeletal: Reports gait dysfunction, Reports low back pain, Reports muscle

weakness, Denies myalgias


Integumentary: Denies pruritus, Denies rash


Neurological: Reports weakness, Denies change in mentation, Denies change in 

speech, Denies seizures, Denies syncope


Psychiatric: Reports anxiety, Denies depression


Endocrine: Denies fatigue, Denies weight change





Objective





- Vital Signs


Vital signs: 


                                   Vital Signs











Temp  98.3 F   11/20/19 05:00


 


Pulse  98   11/20/19 05:00


 


Resp  16   11/20/19 05:00


 


BP  109/71   11/20/19 05:00


 


Pulse Ox  95   11/20/19 05:00








                                 Intake & Output











 11/19/19 11/20/19 11/20/19





 18:59 06:59 18:59


 


Intake Total 1000 1027.5 


 


Output Total  780 


 


Balance 1000 247.5 


 


Weight 78.018 kg  


 


Intake:   


 


  Intake, IV Titration 1000 437.5 





  Amount   


 


    Sodium Chloride 0.9% 1, 1000 437.5 





    000 ml @ 125 mls/hr IV .   





    Q8H [facility] Rx#:[id number]   


 


  Oral  590 


 


Output:   


 


  Urine  780 


 


Other:   


 


  Voiding Method Bedpan Bedpan 





 Urinal Urinal 


 


  # Voids 4  














- Exam





Gen: This is a 54-year-old  female area patient is resting in bed and 

appears to be uncomfortable at this time.  Patient is complaining of active 

nausea.


HEENT: Head is atraumatic, normocephalic. Pupils equal, round. Sclerae is 

anicteric.  Oral mucous membranes are dry.


NECK: Supple. No JVD. No lymphadenopathy. No thyromegaly. 


LUNGS: Clear to auscultation. No wheezes or rhonchi.  No intercostal retraction

s.


HEART: Regular rate and rhythm. No murmur. 


ABDOMEN: Soft. Bowel sounds are present. No masses.  Epigastric tenderness.


EXTREMITIES: No pedal edema.  No calf tenderness.


NEUROLOGICAL: Patient is awake, alert and oriented x3. Cranial nerves 2 through 

12 are grossly intact. 





- Labs


CBC & Chem 7: 


                                 11/19/19 07:40





                                 11/20/19 07:00


Labs: 


                  Abnormal Lab Results - Last 24 Hours (Table)











  11/18/19 11/18/19 11/19/19 Range/Units





  17:54 17:54 07:40 


 


Albumin (PEP)   3.14 L   (3.80-4.90)  g/dL


 


Angiotensin Convert Enz  67 H    (8-52)  U/L


 


Vit D 1,25-Dihydroxy  18 L    (20 - 79)  pg/mL


 


PTH Intact    4.2 L  (14.0-72.0)  pg/mL














Assessment and Plan


Plan: 





1.  Hypercalcemia secondary to malignancy with metastatic disease to the bone.  

Nephrology consult appreciated.  IV fluids decreased to 75 mL per hour, 

discontinue calcitonin, discontinue vitamin D.  Electrophoresis studies in plac

e.





2.  Breast cancer with bone metastasis status post multiple surgical 

interventions.  Oncology consult.





3.  Paroxysmal atrial fibrillation.  Continue Toprol-XL 25 mg in the morning and

50 mg at bedtime, Xarelto 20 mg daily.





4.  Hypothyroidism.  Continue levothyroxine 175 g daily.





5.  Gastroesophageal reflux disease and GI prophylaxis.  Continue Protonix.





6.  Chronic pain syndrome.  Continue Voltaren gel, Robaxin 750 mg every 6 hours 

as needed, OxyContin 30 mg extended release every 12 hours, oxycodone 10 mg 

every 4 hours as needed.





7.  Seasonal ALLERGIES.  Continue Claritin.





8.  History of thyroid cancer.





9.  Generalized anxiety disorder.  Continue Xanax or 0.5 mg 4 times daily as 

needed.





10.  Elevated liver function tests secondary to liver steatosis.





11.  Nausea and vomiting most likely secondary to reaction to morphine.  

Continue Zofran, Pepcid, Carafate added.  Check lipase level





Discharge plan: Return home with [facility].  Physical therapy consult 

added.





Impression and plan of care have been directed as dictated by the signing 

physician.  Kadi Mulligan nurse practitioner acting as scribe for signing 

physician.

## 2019-11-20 NOTE — P.PN
Subjective


Progress Note Date: 11/20/19


Principal diagnosis: 





Hypercalcemia, metastatic breast cancer





In follow-up today patient has complaints of constipation, still having pain in 

the legs, persistent weakness, difficulty tolerating liquid/suspension 

medications, nausea this morning that prevented her from taking many of her 

pills.  She has not been out of bed today





Objective





- Vital Signs


Vital signs: 


                                   Vital Signs











Temp  97.9 F   11/20/19 11:57


 


Pulse  106 H  11/20/19 15:59


 


Resp  18   11/20/19 15:59


 


BP  100/61   11/20/19 11:57


 


Pulse Ox  94 L  11/20/19 11:57








                                 Intake & Output











 11/19/19 11/20/19 11/20/19





 18:59 06:59 18:59


 


Intake Total 1000 1027.5 600


 


Output Total  780 


 


Balance 1000 247.5 600


 


Weight 78.018 kg  


 


Intake:   


 


  Intake, IV Titration 1000 437.5 600





  Amount   


 


    Sodium Chloride 0.9% 1, 1000 437.5 600





    000 ml @ 75 mls/hr IV .   





    [id number] [facility] Rx#:[id number]   


 


  Oral  590 


 


Output:   


 


  Urine  780 


 


Other:   


 


  Voiding Method Bedpan Bedpan Bedpan





 Urinal Urinal Urinal


 


  # Voids 4  














- Constitutional


General appearance: Present: cooperative, mild distress, obese





- EENT


Eyes: Present: anicteric sclerae, EOMI


ENT: Present: hearing grossly normal





- Respiratory


Respiratory: bilateral: CTA, diminished





- Cardiovascular


Rhythm: regular


Heart sounds: normal: S1, S2





- Peripheral edema


  ** leg


Peripheral Edema: bilateral: None





- Gastrointestinal


General gastrointestinal: Present: normal bowel sounds, soft, tenderness


Localized gastrointestinal: tender: diffuse (no rebound)





- Integumentary


Integumentary: Present: pale





- Neurologic


Neurologic: Present: CNII-XII intact





- Musculoskeletal


Musculoskeletal: Present: generalized weakness





- Psychiatric


Psychiatric: Present: A&O x's 3, appropriate affect, intact judgment & insight





- Labs


CBC & Chem 7: 


                                 11/19/19 07:40





                                 11/20/19 07:00





Assessment and Plan


(1) Hypercalcemia


Narrative/Plan: 


Malignant hypercalcemia.  Improved to 11.2today with admin of zometa, fluids and

calcitonin.  continue lab monitoring


Current Visit: Yes   Status: Acute   Priority: High   Code(s): E83.52 - 

HYPERCALCEMIA   SNOMED Code(s): 02418007


   





(2) Metastatic breast cancer


Narrative/Plan: 


Plan is for dose reduced ibrance and faslodex.  These orders are in process 

pending approval and receipt of meds.  This will begin outpatient.  Pt knows the

plan.  


Current Visit: Yes   Status: Acute   Priority: High   Code(s): C50.919 - 

MALIGNANT NEOPLASM OF UNSP SITE OF UNSPECIFIED FEMALE BREAST   SNOMED Code(s): 

302722648


   


Plan: 





Patient is slowly improving.  Adjusted meds to her preference.  Additional PRN 

meds for constipation added as she is c/po constipation.  Anticipate another 1-2

days admission for resolution and management of all of her symptoms.

## 2019-11-20 NOTE — P.PN
Subjective





Patient is seen in follow-up for hypercalcemia.  Calcium level today is down to 

8.6.  Still feels nauseous.  Vomited last night.  No edema.  Has been voiding.





Vital signs are stable.


General: The patient appeared well nourished and normally developed. 


HEENT: Head exam is unremarkable. Neck is without jugular venous distension.


LUNGS: Lungs are clear to auscultation and percussion. Breath sounds decreased.


HEART: Rate and Rhythm are regular. First and second heart sounds normal. No 

murmurs, rubs or gallops. 


ABDOMEN: Abdominal exam reveals normal bowel sounds. Non-tender and non-

distended. No evidence of peritonitis.


EXTREMITITES: No clubbing, cyanosis, or edema.





Objective





- Vital Signs


Vital signs: 


                                   Vital Signs











Temp  98.3 F   11/20/19 05:00


 


Pulse  98   11/20/19 05:00


 


Resp  16   11/20/19 05:00


 


BP  109/71   11/20/19 05:00


 


Pulse Ox  95   11/20/19 05:00








                                 Intake & Output











 11/19/19 11/20/19 11/20/19





 18:59 06:59 18:59


 


Intake Total 1000 1027.5 


 


Output Total  780 


 


Balance 1000 247.5 


 


Weight 78.018 kg  


 


Intake:   


 


  Intake, IV Titration 1000 437.5 





  Amount   


 


    Sodium Chloride 0.9% 1, 1000 437.5 





    000 ml @ 125 mls/hr IV .   





    Q8H [facility] Rx#:[id number]   


 


  Oral  590 


 


Output:   


 


  Urine  780 


 


Other:   


 


  Voiding Method Bedpan Bedpan 





 Urinal Urinal 


 


  # Voids 4  














- Labs


CBC & Chem 7: 


                                 11/19/19 07:40





                                 11/20/19 07:00


Labs: 


                  Abnormal Lab Results - Last 24 Hours (Table)











  11/18/19 11/18/19 11/19/19 Range/Units





  17:54 17:54 07:40 


 


Lymphocytes # (Manual)     (1.0-4.8)  k/uL


 


Metamyelocytes # (Man)     (0)  k/uL


 


Myelocytes # (Manual)     (0)  k/uL


 


Nucleated RBCs     (0-0)  /100 WBC


 


Ionized Calcium Tiffany     (4.5-5.3)  mg/dL


 


Albumin (PEP)   3.14 L   (3.80-4.90)  g/dL


 


Angiotensin Convert Enz  67 H    (8-52)  U/L


 


Vit D 1,25-Dihydroxy  18 L    (20 - 79)  pg/mL


 


PTH Intact    4.2 L  (14.0-72.0)  pg/mL














  11/19/19 11/19/19 Range/Units





  07:40 07:40 


 


Lymphocytes # (Manual)  0.25 L   (1.0-4.8)  k/uL


 


Metamyelocytes # (Man)  0.04 H   (0)  k/uL


 


Myelocytes # (Manual)  0.08 H   (0)  k/uL


 


Nucleated RBCs  5 H   (0-0)  /100 WBC


 


Ionized Calcium Tiffany   6.1 H*  (4.5-5.3)  mg/dL


 


Albumin (PEP)    (3.80-4.90)  g/dL


 


Angiotensin Convert Enz    (8-52)  U/L


 


Vit D 1,25-Dihydroxy    (20 - 79)  pg/mL


 


PTH Intact    (14.0-72.0)  pg/mL














Assessment and Plan


Plan: 





Assessment:


1.  Hypercalcemia, non-parathyroid-induced.  Etiology is malignancy with bone 

metastasis as well as component of volume contraction.  Improved.  PTH 

appropriately suppressed.  Vitamin D level 53.9.  1,25 D3 18. ACE mildly 

elevated at 67.


2.  Breast cancer with bone metastasis.


3.  Benign hypertension.  Controlled.





Plan:


Decreased rate of normal saline to 75 mL an hour.


Discontinue calcitonin.


Status post Zometa on November 18.


Discontinued vitamin D.


Follow-up urine immunofixation.

## 2019-11-21 LAB
ALBUMIN SERPL-MCNC: 3.1 G/DL (ref 3.5–5)
ALP SERPL-CCNC: 176 U/L (ref 38–126)
ALT SERPL-CCNC: 45 U/L (ref 9–52)
ANION GAP SERPL CALC-SCNC: 8 MMOL/L
AST SERPL-CCNC: 68 U/L (ref 14–36)
BUN SERPL-SCNC: 9 MG/DL (ref 7–17)
CALCIUM SPEC-MCNC: 7.8 MG/DL (ref 8.4–10.2)
CHLORIDE SERPL-SCNC: 109 MMOL/L (ref 98–107)
CO2 SERPL-SCNC: 24 MMOL/L (ref 22–30)
GLUCOSE SERPL-MCNC: 85 MG/DL (ref 74–99)
MAGNESIUM SPEC-SCNC: 1.8 MG/DL (ref 1.6–2.3)
POTASSIUM SERPL-SCNC: 3.5 MMOL/L (ref 3.5–5.1)
PROT SERPL-MCNC: 5.9 G/DL (ref 6.3–8.2)
SODIUM SERPL-SCNC: 141 MMOL/L (ref 137–145)

## 2019-11-21 RX ADMIN — ASPIRIN SCH: 325 TABLET ORAL at 08:23

## 2019-11-21 RX ADMIN — LEVOTHYROXINE SODIUM SCH MCG: 75 TABLET ORAL at 05:40

## 2019-11-21 RX ADMIN — HYDROMORPHONE HYDROCHLORIDE PRN MG: 1 INJECTION, SOLUTION INTRAMUSCULAR; INTRAVENOUS; SUBCUTANEOUS at 20:19

## 2019-11-21 RX ADMIN — SUCRALFATE SCH GM: 1 TABLET ORAL at 17:29

## 2019-11-21 RX ADMIN — CEFAZOLIN SCH MLS/HR: 330 INJECTION, POWDER, FOR SOLUTION INTRAMUSCULAR; INTRAVENOUS at 20:21

## 2019-11-21 RX ADMIN — DOCUSATE SODIUM AND SENNOSIDES SCH EACH: 50; 8.6 TABLET ORAL at 20:16

## 2019-11-21 RX ADMIN — ACETAMINOPHEN SCH MG: 500 TABLET ORAL at 05:40

## 2019-11-21 RX ADMIN — METOPROLOL SUCCINATE SCH: 25 TABLET, EXTENDED RELEASE ORAL at 09:03

## 2019-11-21 RX ADMIN — DOCUSATE SODIUM AND SENNOSIDES SCH EACH: 50; 8.6 TABLET ORAL at 09:02

## 2019-11-21 RX ADMIN — OXYCODONE HYDROCHLORIDE SCH MG: 10 TABLET, FILM COATED, EXTENDED RELEASE ORAL at 22:50

## 2019-11-21 RX ADMIN — METOPROLOL SUCCINATE SCH MG: 50 TABLET, EXTENDED RELEASE ORAL at 20:21

## 2019-11-21 RX ADMIN — METHOCARBAMOL PRN MG: 750 TABLET ORAL at 22:53

## 2019-11-21 RX ADMIN — OXYCODONE HYDROCHLORIDE SCH MG: 10 TABLET, FILM COATED, EXTENDED RELEASE ORAL at 08:59

## 2019-11-21 RX ADMIN — POLYETHYLENE GLYCOL 3350 SCH GM: 17 POWDER, FOR SOLUTION ORAL at 09:02

## 2019-11-21 RX ADMIN — POTASSIUM CHLORIDE SCH MEQ: 20 TABLET, EXTENDED RELEASE ORAL at 09:02

## 2019-11-21 RX ADMIN — ACETAMINOPHEN SCH MG: 500 TABLET ORAL at 20:18

## 2019-11-21 RX ADMIN — LORATADINE SCH MG: 10 TABLET ORAL at 08:55

## 2019-11-21 RX ADMIN — CEFAZOLIN SCH MLS/HR: 330 INJECTION, POWDER, FOR SOLUTION INTRAMUSCULAR; INTRAVENOUS at 04:25

## 2019-11-21 RX ADMIN — ACETAMINOPHEN SCH MG: 500 TABLET ORAL at 12:30

## 2019-11-21 RX ADMIN — LEVOTHYROXINE SODIUM SCH MCG: 100 TABLET ORAL at 05:40

## 2019-11-21 RX ADMIN — SUCRALFATE SCH GM: 1 TABLET ORAL at 08:54

## 2019-11-21 RX ADMIN — RIVAROXABAN SCH MG: 20 TABLET, FILM COATED ORAL at 17:32

## 2019-11-21 RX ADMIN — SUCRALFATE SCH GM: 1 TABLET ORAL at 12:31

## 2019-11-21 NOTE — P.PN
Subjective


Progress Note Date: 11/21/19





This is a 54-year-old  female patient of Valdemar Alvarez NP with past 

medical history of breast cancer status post lumpectomy in 2011 with recurrence 

of invasive ductal carcinoma stage III grade IIIa with lumpectomy January 2014 

with bone metastasis and multiple pathologic compression fractures.  Status post

cervical fusion, right femur nail and titanium david all positive for cancer in 

June 2019, kyphoplasty L3 followed by kyphoplasty T12, L1, L5-S1 with 

radiofrequency ablation October 2019, history of thyroid cancer status post 

lobectomy and radiation therapy, chronic atrial fibrillation status post 

ablation on Xarelto, hypothyroidism, chronic pain syndrome, gastroesophageal 

reflux disease, generalized anxiety disorder.  Patient's last chemotherapy was 

performed on 10/16/2019.  Last radiation to the cervical, right femur and lumbar

areas completed on 07/26/2019.  PET scan on 11/09/2019 revealed osseous 

metastatic disease. Patient has [facility] care in place.  Patient states that

she was at [facility] from October 24 through the November 5 at which 

time she underwent lumbar spine procedures.  She states she also has a left 

scapular fracture and rib fractures.  Old fractures are pathologic related to 

cancer with metastatic disease.  She had previously received all her care at 

[facility] and as of Friday switch to Dr. Carr.  Patient received a call

that she needed to come into the hospital as her calcium was elevated.  Patient 

is complaining of numbness to her face that she has had for a couple weeks along

with nausea and vomiting.  Patient is complaining of significant pain all over 

her body.  Nurse relates that at 3 AM patient became very anxious and agitated. 

Her  normally gives her a Xanax at 3 in the morning for this reason and 

instructions have been provided to do the same year.  Patient says she has also 

become very weak and she needs to build ambulate to the bathroom in order for 

her  to care for her at home.





Patient came into [facility] emergency center for evaluation.  

Calcium was 14.9, ionized calcium 8.0, AST 88, ALT 57, alkaline phosphatase 235,

troponin negative, albumin 4.2, electrolytes within normal limits, BUN 20 

creatinine 0.73.  INR 1.0.  WBC 4.5, hemoglobin 12, platelet count 213.  EKG in 

normal sinus rhythm.  Patient was afebrile, blood pressure 101/65, heart rate in

the 80s, pulse ox 95% on room air.  Patient was started on IM calcitonin and 

status post 1 dose of zoledronic acid 4 mg IV.  Patient was admitted to the 

[facility] floor and consults requested with nephrology and oncology. Repeat lab 

work this morning revealed a calcium 11.2 and ionized calcium 6.1, AST 68, ALT 

55, alkaline phosphatase 224.  Parathyroid hormone intact was 3.4.





11/20: Repeat calcium this morning is 8.6.  Dr. Ramirez has recommended 

decreasing IV fluids to 75 mL per hour, discontinue calcitonin and follow-up on 

urine immunofixation.  Patient states she continues to have significant nausea. 

She did have an emesis last evening.  She thinks the nausea and vomiting has 

been related to the MS Contin which should be out of her system soon.  She 

denies having any bowel movement.  She does complain of abdominal pain in 

epigastric area.  Carafate ordered as well as a lipase level.  Physical therapy 

added.





11/21: Patient states that she is feeling okay today.  Her nausea is better but 

still there a little bit.  She denies abdominal pain.  She states she has walked

in her room twice.  She is complaining of constipation and milk of magnesia is 

available.  She states she yogurt last night and for breakfast this morning.  

Calcium 7.8, total bilirubin 0.4, AST 68, ALT 45, alkaline phosphatase 176.  No 

monoclonal paraprotein recognized on the immunofixation.  Plan to increase 

activity, increase oral intake, resolved constipation and plan for discharge 

home tomorrow.








Review of Systems


Constitutional: Reports anorexia, Reports fatigue, Reports lethargy, Reports 

malaise, Reports poor appetite, Reports weakness, Denies chills, Denies fever


Eyes: denies blurred vision, denies pain


Ears, nose, mouth and throat: Denies headache, Denies nasal congestion, Denies 

nasal discharge, Denies sore throat, Denies vertigo


Cardiovascular: Reports chest pain, Denies edema, Denies lightheadedness, Denies

palpitations, Denies shortness of breath, Denies syncope


Respiratory: Denies cough, Denies cough with sputum, Denies dyspnea, Denies 

excessive sputum, Denies hemoptysis, Denies home oxygen, Denies wheezing


Gastrointestinal: Reports loss of appetite, Reports nausea, reports abdominal 

pain, Denies diarrhea, denies vomiting, reports constipation


Genitourinary: Denies dysuria, Denies hematuria, Denies urgency, Denies urinary 

frequency


Musculoskeletal: Reports gait dysfunction, Reports low back pain, Reports muscle

weakness, Denies myalgias


Integumentary: Denies pruritus, Denies rash


Neurological: Reports weakness, Denies change in mentation, Denies change in 

speech, Denies seizures, Denies syncope


Psychiatric: Reports anxiety, Denies depression


Endocrine: Denies fatigue, Denies weight change





Objective





- Vital Signs


Vital signs: 


                                   Vital Signs











Temp  97.1 F L  11/21/19 07:40


 


Pulse  84   11/21/19 08:34


 


Resp  16   11/21/19 08:34


 


BP  101/60   11/21/19 08:34


 


Pulse Ox  95   11/21/19 07:40








                                 Intake & Output











 11/20/19 11/21/19 11/21/19





 18:59 06:59 18:59


 


Intake Total 600 815 


 


Output Total  940 


 


Balance 600 -125 


 


Intake:   


 


  Intake, IV Titration 600 225 





  Amount   


 


    Sodium Chloride 0.9% 1, 600 225 





    000 ml @ 75 mls/hr IV .   





    [id number] [facility] Rx#:[id number]   


 


  Oral  590 


 


Output:   


 


  Urine  940 


 


Other:   


 


  Voiding Method Bedpan Bedpan 





 Urinal Urinal 














- Exam





Gen: This is a 54-year-old  female area patient is resting in bed and 

appears to be comfortable at this time.


HEENT: Head is atraumatic, normocephalic. Pupils equal, round. Sclerae is 

anicteric.  Oral mucous membranes are dry.


NECK: Supple. No JVD. No lymphadenopathy. No thyromegaly. 


LUNGS: Clear to auscultation. No wheezes or rhonchi.  No intercostal 

retractions.


HEART: Regular rate and rhythm. No murmur. 


ABDOMEN: Soft. Bowel sounds are present. No masses.  No abdominal tenderness.


EXTREMITIES: No pedal edema.  No calf tenderness.


NEUROLOGICAL: Patient is awake, alert and oriented x3. Cranial nerves 2 through 

12 are grossly intact. 





- Labs


CBC & Chem 7: 


                                 11/19/19 07:40





                                 11/21/19 07:54


Labs: 


                  Abnormal Lab Results - Last 24 Hours (Table)











  11/21/19 Range/Units





  07:54 


 


Chloride  109 H  ()  mmol/L


 


Creatinine  0.48 L  (0.52-1.04)  mg/dL


 


Calcium  7.8 L  (8.4-10.2)  mg/dL


 


AST  68 H  (14-36)  U/L


 


Alkaline Phosphatase  176 H  ()  U/L


 


Total Protein  5.9 L  (6.3-8.2)  g/dL


 


Albumin  3.1 L  (3.5-5.0)  g/dL














Assessment and Plan


Plan: 





1.  Hypercalcemia secondary to malignancy with metastatic disease to the bone.  

Nephrology consult appreciated.  IV fluids decreased to 75 mL per hour, 

discontinue calcitonin, discontinue vitamin D.  Electrophoresis studies 

negative.





2.  Breast cancer with bone metastasis status post multiple surgical 

interventions.  Oncology consult.





3.  Paroxysmal atrial fibrillation.  Continue Toprol-XL 25 mg in the morning and

50 mg at bedtime, Xarelto 20 mg daily.





4.  Hypothyroidism.  Continue levothyroxine 175 g daily.





5.  Gastroesophageal reflux disease and GI prophylaxis.  Continue Protonix.





6.  Chronic pain syndrome.  Continue Voltaren gel, Robaxin 750 mg every 6 hours 

as needed, OxyContin 30 mg extended release every 12 hours, oxycodone 10 mg 

every 4 hours as needed.





7.  Seasonal ALLERGIES.  Continue Claritin.





8.  History of thyroid cancer.





9.  Generalized anxiety disorder.  Continue Xanax or 0.5 mg 4 times daily as 

needed.





10.  Elevated liver function tests secondary to liver steatosis.





11.  Nausea and vomiting most likely secondary to reaction to morphine.  Continu

e Zofran, Pepcid, Carafate added.  Check lipase level





Discharge plan: Return home with [facility] on Friday.  Physical therapy 

consult added.





Impression and plan of care have been directed as dictated by the signing 

physician.  Kadi Mulligan nurse practitioner acting as scribe for signing [email or web address].

## 2019-11-21 NOTE — P.PN
Subjective


Progress Note Date: 11/21/19


Principal diagnosis: 





Hypercalcemia, metastatic breast cancer





In follow-up today patient still has not had a bowel movement, she is still 

complaining of low any pain, not much is providing her with relief for any 

duration of time.  Calcium is significantly improved, her vital signs are 

stable.  No fevers, vomiting





Objective





- Vital Signs


Vital signs: 


                                   Vital Signs











Temp  97.9 F   11/21/19 12:49


 


Pulse  86   11/21/19 12:49


 


Resp  16   11/21/19 12:49


 


BP  106/60   11/21/19 12:49


 


Pulse Ox  94 L  11/21/19 12:49








                                 Intake & Output











 11/20/19 11/21/19 11/21/19





 18:59 06:59 18:59


 


Intake Total 600 815 650


 


Output Total  940 


 


Balance 600 -125 650


 


Intake:   


 


  Intake, IV Titration 600 225 650





  Amount   


 


    Sodium Chloride 0.9% 1, 600 225 650





    000 ml @ 75 mls/hr IV .   





    [id number] [facility] Rx#:[id number]   


 


  Oral  590 


 


Output:   


 


  Urine  940 


 


Other:   


 


  Voiding Method Bedpan Bedpan Toilet





 Urinal Urinal 


 


  # Voids   1














- Constitutional


General appearance: Present: cooperative, disheveled, mild distress, obese





- EENT


Eyes: Present: anicteric sclerae, EOMI


ENT: Present: hearing grossly normal





- Respiratory


Respiratory: bilateral: CTA





- Cardiovascular


Heart sounds: normal: S1, S2


Abnormal Heart Sounds: Absent: systolic murmur, diastolic murmur, rub, S3 

Gallop, S4 Gallop, click, other





- Peripheral edema


  ** leg


Peripheral Edema: bilateral: None





- Gastrointestinal


General gastrointestinal: Present: normal bowel sounds, soft, tenderness





- Integumentary


Integumentary: Present: pale





- Neurologic


Neurologic: Present: CNII-XII intact





- Musculoskeletal


Musculoskeletal: Present: generalized weakness





- Psychiatric


Psychiatric: Present: A&O x's 3, appropriate affect, intact judgment & insight





- Labs


CBC & Chem 7: 


                                 11/19/19 07:40





                                 11/21/19 07:54


Labs: 


                  Abnormal Lab Results - Last 24 Hours (Table)











  11/21/19 Range/Units





  07:54 


 


Chloride  109 H  ()  mmol/L


 


Creatinine  0.48 L  (0.52-1.04)  mg/dL


 


Calcium  7.8 L  (8.4-10.2)  mg/dL


 


AST  68 H  (14-36)  U/L


 


Alkaline Phosphatase  176 H  ()  U/L


 


Total Protein  5.9 L  (6.3-8.2)  g/dL


 


Albumin  3.1 L  (3.5-5.0)  g/dL














Assessment and Plan


(1) Hypercalcemia


Current Visit: Yes   Status: Resolved   Priority: High   Code(s): E83.52 - 

HYPERCALCEMIA   SNOMED Code(s): 35424561


   





(2) Metastatic breast cancer


Narrative/Plan: 


Plan is for dose reduced ibrance and faslodex.  These orders are in process 

pending approval and receipt of meds.  This will begin outpatient.  Pt knows the

plan.  


Current Visit: Yes   Status: Acute   Priority: High   Code(s): C50.919 - 

MALIGNANT NEOPLASM OF UNSP SITE OF UNSPECIFIED FEMALE BREAST   SNOMED Code(s): 

290400629


   


Plan: 





Patient is slowly improving.  Adjusted meds to her preference.  Additional PRN 

meds for constipation added as she is c/po constipation.  


Pt is cleared from Hem/Onc for DC, she is pending Attending and Consulting 

Physician clearance

## 2019-11-21 NOTE — P.PN
Subjective





Patient is seen in follow-up for hypercalcemia.  Calcium level today is down to 

7.8.  No vomiting overnight.  Oral intake gradually improving.  Urine output is 

good. 





Vital signs are stable.


General: The patient appeared well nourished and normally developed. 


HEENT: Head exam is unremarkable. Neck is without jugular venous distension.


LUNGS: Lungs are clear to auscultation and percussion. Breath sounds decreased.


HEART: Rate and Rhythm are regular. First and second heart sounds normal. No 

murmurs, rubs or gallops. 


ABDOMEN: Abdominal exam reveals normal bowel sounds. Non-tender and non-

distended. No evidence of peritonitis.


EXTREMITITES: No clubbing, cyanosis, or edema.





Objective





- Vital Signs


Vital signs: 


                                   Vital Signs











Temp  97.1 F L  11/21/19 07:40


 


Pulse  84   11/21/19 08:34


 


Resp  16   11/21/19 08:34


 


BP  101/60   11/21/19 08:34


 


Pulse Ox  95   11/21/19 07:40








                                 Intake & Output











 11/20/19 11/21/19 11/21/19





 18:59 06:59 18:59


 


Intake Total 600 815 


 


Output Total  940 


 


Balance 600 -125 


 


Intake:   


 


  Intake, IV Titration 600 225 





  Amount   


 


    Sodium Chloride 0.9% 1, 600 225 





    000 ml @ 75 mls/hr IV .   





    [id number] [facility] Rx#:[id number]   


 


  Oral  590 


 


Output:   


 


  Urine  940 


 


Other:   


 


  Voiding Method Bedpan Bedpan 





 Urinal Urinal 














- Labs


CBC & Chem 7: 


                                 11/19/19 07:40





                                 11/21/19 07:54


Labs: 


                  Abnormal Lab Results - Last 24 Hours (Table)











  11/21/19 Range/Units





  07:54 


 


Chloride  109 H  ()  mmol/L


 


Creatinine  0.48 L  (0.52-1.04)  mg/dL


 


Calcium  7.8 L  (8.4-10.2)  mg/dL


 


AST  68 H  (14-36)  U/L


 


Alkaline Phosphatase  176 H  ()  U/L


 


Total Protein  5.9 L  (6.3-8.2)  g/dL


 


Albumin  3.1 L  (3.5-5.0)  g/dL














Assessment and Plan


Plan: 





Assessment:


1.  Hypercalcemia, non-parathyroid-induced.  Etiology is malignancy with bone 

metastasis as well as component of volume contraction.  Improved.  Status post 

calcitonin and Zometa.  PTH appropriately suppressed.  Vitamin D level 53.9.  

1,25 D3 18. ACE mildly elevated at 67.  No monoclonality noted on serum 

immunofixation.


2.  Breast cancer with bone metastasis.


3.  Benign hypertension.  Controlled.





Plan:


Maintain normal saline at 75 mL an hour.


Encourage oral intake.


Repeat electrolytes in the morning.

## 2019-11-21 NOTE — CT
EXAMINATION TYPE: CT angio chest

 

DATE OF EXAM: 11/21/2019 8:49 PM

 

COMPARISON: 12/4/2018

 

HISTORY: Chest pain and elevated d-dimer.

 

CT DLP: 426.8 mGycm

Automated exposure control for dose reduction was used.

 

CONTRAST: 

CTA scan of the thorax is performed with IV Contrast, patient injected with 66ml mL of Isovue 370, pu
lmonary embolism protocol.  There are 3-D post processed images..  

 

FINDINGS:

 

There are mild bilateral pleural effusions. There is some atelectasis at the right lung base. There a
re clips from cholecystectomy. Heart size is normal. There is no pericardial effusion. There is no me
diastinal adenopathy. Thoracic aorta is intact without evidence of aneurysm or dissection. I see no f
illing defects in the pulmonary arteries.

 

There are numerous areas of lytic and blastic change in the bony thorax consistent with advanced meta
static disease.

 

IMPRESSION: 

NO EVIDENCE OF PULMONARY EMBOLISM. EXTENSIVE METASTATIC DISEASE IN THE BONY THORAX IS A SIGNIFICANT C
HANGE COMPARED TO OLD CT SCAN. THERE IS NEW BILATERAL PLEURAL EFFUSIONS AND ATELECTASIS AT THE LUNG B
ASES COMPARED TO OLD EXAM.

## 2019-11-22 LAB
ANION GAP SERPL CALC-SCNC: 8 MMOL/L
BUN SERPL-SCNC: 8 MG/DL (ref 7–17)
CALCIUM SPEC-MCNC: 7.2 MG/DL (ref 8.4–10.2)
CHLORIDE SERPL-SCNC: 109 MMOL/L (ref 98–107)
CO2 SERPL-SCNC: 24 MMOL/L (ref 22–30)
GLUCOSE SERPL-MCNC: 91 MG/DL (ref 74–99)
MAGNESIUM SPEC-SCNC: 1.9 MG/DL (ref 1.6–2.3)
POTASSIUM SERPL-SCNC: 3.1 MMOL/L (ref 3.5–5.1)
SODIUM SERPL-SCNC: 141 MMOL/L (ref 137–145)

## 2019-11-22 RX ADMIN — POTASSIUM CHLORIDE SCH MEQ: 20 TABLET, EXTENDED RELEASE ORAL at 16:37

## 2019-11-22 RX ADMIN — KETOROLAC TROMETHAMINE SCH: 30 INJECTION, SOLUTION INTRAMUSCULAR at 19:37

## 2019-11-22 RX ADMIN — LORATADINE SCH MG: 10 TABLET ORAL at 08:54

## 2019-11-22 RX ADMIN — DOCUSATE SODIUM AND SENNOSIDES SCH EACH: 50; 8.6 TABLET ORAL at 08:55

## 2019-11-22 RX ADMIN — METOPROLOL SUCCINATE SCH MG: 50 TABLET, EXTENDED RELEASE ORAL at 21:03

## 2019-11-22 RX ADMIN — OXYCODONE HYDROCHLORIDE SCH MG: 10 TABLET, FILM COATED, EXTENDED RELEASE ORAL at 21:00

## 2019-11-22 RX ADMIN — ACETAMINOPHEN SCH MG: 500 TABLET ORAL at 12:55

## 2019-11-22 RX ADMIN — SUCRALFATE SCH GM: 1 TABLET ORAL at 12:55

## 2019-11-22 RX ADMIN — LEVOTHYROXINE SODIUM SCH MCG: 100 TABLET ORAL at 05:50

## 2019-11-22 RX ADMIN — POLYETHYLENE GLYCOL 3350 SCH GM: 17 POWDER, FOR SOLUTION ORAL at 08:55

## 2019-11-22 RX ADMIN — CEFAZOLIN SCH MLS/HR: 330 INJECTION, POWDER, FOR SOLUTION INTRAMUSCULAR; INTRAVENOUS at 21:00

## 2019-11-22 RX ADMIN — KETOROLAC TROMETHAMINE SCH MG: 30 INJECTION, SOLUTION INTRAMUSCULAR at 14:46

## 2019-11-22 RX ADMIN — ASPIRIN SCH EACH: 325 TABLET ORAL at 08:57

## 2019-11-22 RX ADMIN — POTASSIUM CHLORIDE SCH MEQ: 20 TABLET, EXTENDED RELEASE ORAL at 12:56

## 2019-11-22 RX ADMIN — ACETAMINOPHEN SCH MG: 500 TABLET ORAL at 05:48

## 2019-11-22 RX ADMIN — SUCRALFATE SCH GM: 1 TABLET ORAL at 16:37

## 2019-11-22 RX ADMIN — SUCRALFATE SCH GM: 1 TABLET ORAL at 08:54

## 2019-11-22 RX ADMIN — LEVOTHYROXINE SODIUM SCH MCG: 75 TABLET ORAL at 05:50

## 2019-11-22 RX ADMIN — ACETAMINOPHEN SCH MG: 500 TABLET ORAL at 21:00

## 2019-11-22 RX ADMIN — DOCUSATE SODIUM AND SENNOSIDES SCH EACH: 50; 8.6 TABLET ORAL at 21:00

## 2019-11-22 RX ADMIN — BACITRACIN ZINC, NEOMYCIN, POLYMYXIN B PRN APPLIC: 400; 3.5; 5 OINTMENT TOPICAL at 13:49

## 2019-11-22 RX ADMIN — POTASSIUM CHLORIDE SCH MEQ: 20 TABLET, EXTENDED RELEASE ORAL at 08:55

## 2019-11-22 RX ADMIN — METHOCARBAMOL PRN MG: 750 TABLET ORAL at 23:20

## 2019-11-22 RX ADMIN — RIVAROXABAN SCH MG: 20 TABLET, FILM COATED ORAL at 16:37

## 2019-11-22 RX ADMIN — OXYCODONE HYDROCHLORIDE SCH MG: 10 TABLET, FILM COATED, EXTENDED RELEASE ORAL at 09:19

## 2019-11-22 RX ADMIN — METHOCARBAMOL PRN MG: 750 TABLET ORAL at 16:46

## 2019-11-22 RX ADMIN — METOPROLOL SUCCINATE SCH MG: 25 TABLET, EXTENDED RELEASE ORAL at 08:54

## 2019-11-22 RX ADMIN — CEFAZOLIN SCH MLS/HR: 330 INJECTION, POWDER, FOR SOLUTION INTRAMUSCULAR; INTRAVENOUS at 08:27

## 2019-11-22 NOTE — P.PN
Subjective





Patient is seen in follow-up for hypercalcemia.  Calcium level today is down to 

7.2.  No vomiting overnight.  Oral intake gradually improving.  Urine output is 

good.  Did develop chest pain yesterday. Currently undergoing a cardiogram.





Vital signs are stable.


General: The patient appeared well nourished and normally developed. 


HEENT: Head exam is unremarkable. Neck is without jugular venous distension.


LUNGS: Lungs are clear to auscultation and percussion. Breath sounds decreased.


HEART: Rate and Rhythm are regular. First and second heart sounds normal. No 

murmurs, rubs or gallops. 


ABDOMEN: Abdominal exam reveals normal bowel sounds. Non-tender and non-

distended. No evidence of peritonitis.


EXTREMITITES: No clubbing, cyanosis, or edema.





Objective





- Vital Signs


Vital signs: 


                                   Vital Signs











Temp  97.1 F L  11/22/19 07:49


 


Pulse  80   11/22/19 08:30


 


Resp  16   11/22/19 08:30


 


BP  108/69   11/22/19 07:49


 


Pulse Ox  95   11/22/19 07:49








                                 Intake & Output











 11/21/19 11/22/19 11/22/19





 18:59 06:59 18:59


 


Intake Total 650 640 


 


Output Total  200 


 


Balance 650 440 


 


Intake:   


 


  Intake, IV Titration 650 350 





  Amount   


 


    Sodium Chloride 0.9% 1, 650 350 





    000 ml @ 75 mls/hr IV .   





    [id number] [facility] Rx#:[id number]   


 


  Oral  290 


 


Output:   


 


  Urine  200 


 


Other:   


 


  Voiding Method Toilet  Toilet


 


  # Voids 1 2 














- Labs


CBC & Chem 7: 


                                 11/19/19 07:40





                                 11/22/19 08:31


Labs: 


                  Abnormal Lab Results - Last 24 Hours (Table)











  11/21/19 11/22/19 Range/Units





  18:13 08:31 


 


D-Dimer  2.43 H   (<0.60)  mg/L FEU


 


Potassium   3.1 L  (3.5-5.1)  mmol/L


 


Chloride   109 H  ()  mmol/L


 


Creatinine   0.50 L  (0.52-1.04)  mg/dL


 


Calcium   7.2 L  (8.4-10.2)  mg/dL














Assessment and Plan


Plan: 





Assessment:


1.  Hypercalcemia, non-parathyroid-induced.  Etiology is malignancy with bone 

metastasis as well as component of volume contraction.  Improved.  Status post 

calcitonin and Zometa.  PTH appropriately suppressed.  Vitamin D level 53.9.  

1,25 D3 18. ACE mildly elevated at 67.  No monoclonality noted on serum 

immunofixation.


2.  Breast cancer with bone metastasis.


3.  Benign hypertension.  Controlled.


4.  Hypokalemia from poor oral intake.





Plan:


Maintain normal saline at 75 mL an hour.


Encourage oral intake.


Follow-up echocardiogram.


Replace potassium.  60 meq today.


Repeat electrolytes in the morning.

## 2019-11-22 NOTE — P.CRDCN
History of Present Illness


History of present illness: 





HISTORY OF PRESENTING ILLNESS


This is a pleasant 54-year-old  female past medical history significant

for paroxysmal atrial fibrillation status post ablation on long-term anticoagul

ation, breast cancer status post lumpectomy  with reoccurrence and bone 

metastasis and chronic back pain.  She presented on advice of her PCP secondary 

to abnormal lab value.  She follows in the office with a cardiologist out of 

[facility], Dr. Wilder. We have been asked to see him in consultation 

for chest pain. She states last night while laying in bed she had an acute onset

of sharp pain in the upper mid-sternal region. The pain was very sharp in nature

and worse with deep inspiration, coughing or movement of her body. She continues

to feel this pain intermittently with deep inspiration. It is mildly 

reproducible on palpation. 





DIAGNOSTICS


EKG reveals sinus mechanism 3.


CTA chest negative for pulmonary embolism, extensive metastatic disease and the 

bony thorax and new bilateral pleural effusions and atelectasis bilateral lung 

bases.


Laboratory reviewed, WBC 4.2, hemoglobin 10.9, platelets 184, d-dimer 2.43, 

sodium 141, potassium 3.1, creatinine 0.5, cardiac enzymes negative 2.


Current cardiac medications include Toprol 25 mg in the morning and 50 mg at 

bedtime. 





REVIEW OF SYSTEMS


At the time of my exam:


CONSTITUTIONAL: Denies fever or chills.


CARDIOVASCULAR: Complains of pleuritic chest pain. Denies exertional chest pain,

shortness of breath, orthopnea, PND or palpitations.


RESPIRATORY: Denies cough. 


GASTROINTESTINAL: Denies abdominal pain, diarrhea, constipation, nausea or 

vomiting.


MUSCULOSKELETAL: Denies myalgias.


NEUROLOGIC: Denies numbness, tingling or weakness.


ENDOCRINE: Denies fatigue, weight change,  polydipsia or polyurina.


GENITOURINARY: Denies burning, hematuria or urgency with micturation.


HEMATOLOGIC: Denies history of anemia or bleeding. 





PHYSICAL EXAMINATION


Blood pressure 108/69 heart rate 80 afebrile and maintaining oxygen saturaiton 

on room air.


CONSTITUTIONAL: No apparent distress. 


HEENT: Head is normocephalic. Pupils are equal, round. Sclerae anicteric. Mucous

membranes of the mouth are moist.  No JVD. No carotid bruit.


CHEST EXAMINATION: Lungs are clear to auscultation. Mild chest wall tenderness 

is noted on palpation and with deep breathing. 


HEART EXAMINATION: Regular rate and rhythm. S1, S2 heard. No murmurs, gallops or

rub.


ABDOMEN: Soft, nontender. Positive bowel sounds.


EXTREMITIES: 2+ peripheral pulses, no lower extremity edema and no calf 

tenderness.


NEUROLOGIC EXAMINATION: Patient is awake, alert and oriented x3. 





ASSESSMENT


Chest pain, atypical with pleuritic features.  An acute coronary event has been 

ruled out.  Negative for pulmonary embolism.


Hypercalcemia, improved


Breast cancer with bone metastasis


Paroxysmal atrial fibrillation status post ablation on long-term anticoagulation





PLAN


An acute coronary event has been ruled out. 


Pain is atypical for angina with pleuritic features, likely related to 

muskuloskeletal strain or bone metastasis. Negative for PE. 


Echocardiogram obtained and revealed normal LV systolic function with no wall 

motion abnormalities. 


We will follow as needed, please call with further questions or concerns. 


Thank you kindly for this consultation. 








Nurse Practitioner note has been reviewed, I agree with a documented findings 

and plan of care.  Patient was seen and examined.











Past Medical History


Past Medical History: Atrial Fibrillation, Cancer, Fibromyalgia, GERD/Reflux


Additional Past Medical History / Comment(s): breast cancer status post 

lumpectomy in  with recurrence of invasive ductal carcinoma stage III grade 

IIIa with lumpectomy 2014 with bone metastasis and multiple pathologic 

compression fractures.  Status post cervical fusion, right femur nail and 

titanium david all positive for cancer in 2019, kyphoplasty L3 followed by 

kyphoplasty T12, L1, L5-S1 with radiofrequency ablation 2019, history of

thyroid cancer status post lobectomy and radiation therapy, chronic atrial 

fibrillation status post ablation on Xarelto, hypothyroidism, chronic pain synd

morenita, gastroesophageal reflux disease, generalized anxiety disorder.


History of Any Multi-Drug Resistant Organisms: None Reported


Past Surgical History: Ablation, Back Surgery, Cholecystectomy, Hysterectomy


Additional Past Surgical History / Comment(s): lumpectomy left breast x3, 

lobectomy thyroid, c5 c6 c7 fusion, bone marrow biopsy, bar/nail in right thigh 

and plate in right arm, ablation on heart , gallstone removed from bile 

duct, left breast re-incision for clear margins in ,port plcement, 2015 had 

port removed, kyphoplasty on back x3 and radiofrequncy ablation to back,


Past Anesthesia/Blood Transfusion Reactions: Previous Problems w/ Anesthesia


Additional Past Anesthesia/Blood Transfusion Reaction / Comment(s): Hard time 

waking up and hypothermia


Past Psychological History: No Psychological Hx Reported


Additional Psychological History / Comment(s): Patient lives in ranch style home

with .


Smoking Status: Never smoker


Past Alcohol Use History: None Reported


Additional Past Alcohol Use History / Comment(s): Patient is a lifelong 

nonsmoker, no marijuana or illicit drug use.


Past Drug Use History: None Reported





- Past Family History


  ** Son(s)


History Unknown: Yes


Additional Family Medical History / Comment(s): Small nodule on thyroid, no 

other history





  ** Father


Additional Family Medical History / Comment(s): Patient does not know any 

history on her father.





  ** Mother


Additional Family Medical History / Comment(s): Mother  at age 83 from a 

perforated bowel with fistula to the bladder, COPD history.





  ** Brother(s)


Additional Family Medical History / Comment(s): Patient has 1 brother that had 

part of his colon removed.  Patient does not have any sisters.





Medications and Allergies


                                Home Medications











 Medication  Instructions  Recorded  Confirmed  Type


 


Calcium Carbonate [Tums] 500 - 1,000 mg PO QID PRN 18 History


 


Ergocalciferol [Vitamin D2] 50,000 unit PO MACHADO 18 History


 


Levothyroxine Sodium [Synthroid] 175 mcg PO DAILY@0500 18 History


 


Loratadine 10 mg PO DAILY 18 History


 


Omeprazole 20 mg PO DAILY 18 History


 


ALPRAZolam [Xanax] 0.5 mg PO QID PRN 18 History


 


Acetaminophen Tab [Tylenol Tab] 1,000 mg PO Q8H 19 History


 


Colace   50 mg PO BID 19 History


 


Diclofenac Sodium [Voltaren Gel] 2 gram TOPICAL QID PRN 19 

History


 


HYDROmorphone HCL 2 mg PO Q6H PRN 19 History


 


Methocarbamol [Robaxin] 750 mg PO Q6H PRN 19 History


 


Metoprolol Succinate (ER) [Toprol 25 mg PO DAILY 19 History





Xl]    


 


Metoprolol Succinate (ER) [Toprol 50 mg PO HS 19 History





Xl]    


 


Morphine Sulfate ER [Ms Contin] 30 mg PO Q12HR 19 History


 


Polyethylene Glycol 3350 [Miralax] 17 gm PO DAILY 19 History


 


Potassium Chloride [Klor-Con 20] 20 meq PO DAILY 19 History


 


Rivaroxaban [Xarelto] 20 mg PO W/SUPPER 19 History


 


Sennosides [Senna] 8.6 mg PO BID 19 History


 


oxyCODONE HCL [Roxicodone] 10 mg PO Q4H PRN 19 History








                                    Allergies











Allergy/AdvReac Type Severity Reaction Status Date / Time


 


gabapentin Allergy  Rash/Hives Verified 19 14:17


 


vancomycin Allergy  Rash/Hives Verified 19 14:17














Physical Exam


Vitals: 


                                   Vital Signs











  Temp Pulse Resp BP Pulse Ox


 


 19 08:30   80  16  


 


 19 07:49  97.1 F L  80  16  108/69  95


 


 19 05:00  97.9 F  84  16  102/70  95


 


 19 20:32  97.9 F  85  20  109/75  97


 


 19 12:49  97.9 F  86  16  106/60  94 L








                                Intake and Output











 19





 22:59 06:59 14:59


 


Intake Total 290 350 


 


Output Total 200  


 


Balance 90 350 


 


Intake:   


 


  Intake, IV Titration  350 





  Amount   


 


    Sodium Chloride 0.9% 1,  350 





    000 ml @ 75 mls/hr IV .   





    [id number] [facility] Rx#:[id number]   


 


  Oral 290  


 


Output:   


 


  Urine 200  


 


Other:   


 


  Voiding Method   Toilet


 


  # Voids 1 2 














Results





                                 19 07:40





                                 19 08:31


                                 Cardiac Enzymes











  19 Range/Units





  18:13 08:31 


 


Troponin I  <0.012  <0.012  (0.000-0.034)  ng/mL








                          Comprehensive Metabolic Panel











  19 Range/Units





  08:31 


 


Sodium  141  (137-145)  mmol/L


 


Potassium  3.1 L  (3.5-5.1)  mmol/L


 


Chloride  109 H  ()  mmol/L


 


Carbon Dioxide  24  (22-30)  mmol/L


 


BUN  8  (7-17)  mg/dL


 


Creatinine  0.50 L  (0.52-1.04)  mg/dL


 


Glucose  91  (74-99)  mg/dL


 


Calcium  7.2 L  (8.4-10.2)  mg/dL








                               Current Medications











Generic Name Dose Route Start Last Admin





  Trade Name Freq  PRN Reason Stop Dose Admin


 


Acetaminophen  1,000 mg  19 21:00  19 05:48





  Tylenol Tab  PO   1,000 mg





  Q8H [facility]   Administration


 


Alprazolam  0.5 mg  19 19:37 





  Xanax  PO  





  QID PRN  





  Anxiety  


 


Diclofenac Sodium  2 gm  19 19:37 





  Voltaren Gel  TOPICAL  





  QID PRN  





  Joint Pain  


 


Hydromorphone HCl  1 mg  19 15:49  19 20:19





  Dilaudid  IVP   1 mg





  Q4HR PRN   Administration





  Pain  


 


Sodium Chloride  1,000 mls @ 75 mls/hr  19 08:45  19 08:27





  Saline 0.9%  IV   75 mls/hr





  .[id number] [facility]   Administration


 


Levothyroxine Sodium  100 mcg  19 06:30  19 05:50





  Synthroid  PO   100 mcg





  DAILY@0630 [facility]   Administration


 


Levothyroxine Sodium  75 mcg  19 06:30  19 05:50





  Synthroid  PO   75 mcg





  DAILY@0630 [facility]   Administration


 


Loratadine  10 mg  19 09:00  19 08:54





  Claritin  PO   10 mg





  DAILY [facility]   Administration


 


Magnesium Hydroxide  2,400 mg  19 18:19  19 12:34





  Milk Of Magnesia  PO   2,400 mg





  BID PRN   Administration





  Constipation  


 


Methocarbamol  750 mg  19 19:37  19 22:53





  Robaxin  PO   750 mg





  Q6H PRN   Administration





  Spasms  


 


Metoprolol Succinate  25 mg  19 09:00  19 08:54





  Toprol Xl  PO   25 mg





  DAILY [facility]   Administration


 


Metoprolol Succinate  50 mg  19 21:00  19 20:21





  Toprol Xl  PO   50 mg





  HS [facility]   Administration


 


Neomycin/Polymyxin/Bacitracin  1 applic  19 10:17 





  Triple Antibiotic Ointment  TOPICAL  





  BID PRN  





  Skin Irritation  


 


Nitroglycerin  0.4 mg  19 17:21 





  Nitrostat  SUBLINGUAL  





  Q5M PRN  





  Chest Pain  


 


Omeprazole 20mg  1 each  19 07:30  19 08:57





  PO   1 each





  AC-BRKFST [facility]   Administration


 


Ondansetron HCl  4 mg  19 15:49  19 22:59





  Zofran  IVP   4 mg





  Q6HR PRN   Administration





  Nausea And Vomiting  


 


Oxycodone HCl  10 mg  19 19:37  19 08:33





  Oxyir  PO   10 mg





  Q4H PRN   Administration





  SEVERE Pain  


 


Oxycodone HCl  30 mg  19 21:00  19 09:19





  Oxycontin 10mg E.R.  PO   30 mg





  Q12HR [facility]   Administration


 


Polyethylene Glycol  17 gm  19 09:00  19 08:55





  Miralax  PO   17 gm





  DAILY [facility]   Administration


 


Potassium Chloride  20 meq  19 09:00  19 08:55





  K-Dur 20  PO   20 meq





  DAILY [facility]   Administration


 


Potassium Chloride  40 meq  19 12:00 





  K-Dur 20  PO  19 14:01 





  Q2HR [facility]  


 


Rivaroxaban  20 mg  19 19:45  19 17:32





  Xarelto  PO   20 mg





  W/SUPPER [facility]   Administration


 


Senna/Docusate Sodium  2 each  19 21:00  19 08:55





  Senokot-S  PO   2 each





  BID [facility]   Administration


 


Sodium Chloride  2 spray  19 12:28 





  [place]  NASAL  





  QID PRN  





  Dry Nasal Passages  


 


Sucralfate  1 gm  19 12:30  19 08:54





  Carafate  PO   1 gm





  AC-TID [facility]   Administration








                                Intake and Output











 19





 22:59 06:59 14:59


 


Intake Total 290 350 


 


Output Total 200  


 


Balance 90 350 


 


Intake:   


 


  Intake, IV Titration  350 





  Amount   


 


    Sodium Chloride 0.9% 1,  350 





    000 ml @ 75 mls/hr IV .   





    [id number] [facility] Rx#:[id number]   


 


  Oral 290  


 


Output:   


 


  Urine 200  


 


Other:   


 


  Voiding Method   Toilet


 


  # Voids 1 2 








                                        





                                 19 07:40 





                                 19 08:31

## 2019-11-22 NOTE — ECHOF
Referral Reason:cp, pleuritic, recent chemo



MEASUREMENTS

--------

HEIGHT: 165.1 cm

WEIGHT: 78.0 kg

BP: 108/69

RVIDd:   2.6 cm     (< 3.3)

IVSd:   1.3 cm     (0.6 - 1.1)

LVIDd:   3.5 cm     (3.9 - 5.3)

LVPWd:   1.9 cm     (0.6 - 1.1)

IVSs:   1.9 cm

LVIDs:   2.7 cm

LVPWs:   1.9 cm

LAESV Index (A-L):   40.28 ml/m

Ao Diam:   3.2 cm     (2.0 - 3.7)

AV Cusp:   1.9 cm     (1.5 - 2.6)

LA Diam:   3.6 cm     (2.7 - 3.8)

MV EXCURSION:   12.148 mm     (> 18.000)

MV EF SLOPE:   80 mm/s     (70 - 150)

EPSS:   0.6 cm

MV E Gabino:   1.00 m/s

MV DecT:   168 ms

MV A Gabino:   0.83 m/s

MV E/A Ratio:   1.21 

RAP:   5.00 mmHg

RVSP:   34.73 mmHg







FINDINGS

--------

Sinus rhythm.

This was a technically adequate study.

The left ventricular size is normal.   There is mild concentric left ventricular hypertrophy.   Overa
ll left ventricular systolic function is low-normal with, an EF between 50 - 55 %.   The diastolic fi
lling pattern is normal for the age of the patient.

The right ventricle is normal in size.

Left atrium is moderately dilated by volume.

The right atrial size is normal.

Interatrial and interventricular septum intact.

There is no evidence of aortic regurgitation.   There is no evidence of aortic stenosis.

Mild mitral regurgitation is present.

Mild tricuspid regurgitation present.   There is mild pulmonary hypertension.   The right ventricular
 systolic pressure, as measured by Doppler, is 34.73mmHg.

There is no pulmonic regurgitation present.

The aortic root size is normal.

The inferior vena cava was not well visualized.

There is no pericardial effusion.



CONCLUSIONS

--------

1. Sinus rhythm.

2. This was a technically adequate study.

3. The left ventricular size is normal.

4. Overall left ventricular systolic function is low-normal with, an EF between 50 - 55 %.

5. The diastolic filling pattern is normal for the age of the patient.

6. The right ventricle is normal in size.

7. Left atrium is moderately dilated by volume.

8. The right atrial size is normal.

9. Interatrial and interventricular septum intact.

10. There is no evidence of aortic regurgitation.

11. There is no evidence of aortic stenosis.

12. Mild tricuspid regurgitation present.

13. There is mild pulmonary hypertension.

14. The right ventricular systolic pressure, as measured by Doppler, is 34.73mmHg.

15. There is no pulmonic regurgitation present.

16. The aortic root size is normal.

17. The inferior vena cava was not well visualized.

18. There is no pericardial effusion.





SONOGRAPHER: Daily Varner RDCS

## 2019-11-22 NOTE — P.PN
Subjective


Progress Note Date: 11/22/19





This is a 54-year-old  female patient of Valdemar Alvarez NP with past 

medical history of breast cancer status post lumpectomy in 2011 with recurrence 

of invasive ductal carcinoma stage III grade IIIa with lumpectomy January 2014 

with bone metastasis and multiple pathologic compression fractures.  Status post

cervical fusion, right femur nail and titanium david all positive for cancer in 

June 2019, kyphoplasty L3 followed by kyphoplasty T12, L1, L5-S1 with 

radiofrequency ablation October 2019, history of thyroid cancer status post 

lobectomy and radiation therapy, chronic atrial fibrillation status post 

ablation on Xarelto, hypothyroidism, chronic pain syndrome, gastroesophageal 

reflux disease, generalized anxiety disorder.  Patient's last chemotherapy was 

performed on 10/16/2019.  Last radiation to the cervical, right femur and lumbar

areas completed on 07/26/2019.  PET scan on 11/09/2019 revealed osseous 

metastatic disease. Patient has [facility] care in place.  Patient states that

she was at [facility] from October 24 through the November 5 at which 

time she underwent lumbar spine procedures.  She states she also has a left 

scapular fracture and rib fractures.  Old fractures are pathologic related to 

cancer with metastatic disease.  She had previously received all her care at 

[facility] and as of Friday switch to Dr. Carr.  Patient received a call

that she needed to come into the hospital as her calcium was elevated.  Patient 

is complaining of numbness to her face that she has had for a couple weeks along

with nausea and vomiting.  Patient is complaining of significant pain all over 

her body.  Nurse relates that at 3 AM patient became very anxious and agitated. 

Her  normally gives her a Xanax at 3 in the morning for this reason and 

instructions have been provided to do the same year.  Patient says she has also 

become very weak and she needs to build ambulate to the bathroom in order for 

her  to care for her at home.





Patient came into [facility] emergency center for evaluation.  

Calcium was 14.9, ionized calcium 8.0, AST 88, ALT 57, alkaline phosphatase 235,

troponin negative, albumin 4.2, electrolytes within normal limits, BUN 20 

creatinine 0.73.  INR 1.0.  WBC 4.5, hemoglobin 12, platelet count 213.  EKG in 

normal sinus rhythm.  Patient was afebrile, blood pressure 101/65, heart rate in

the 80s, pulse ox 95% on room air.  Patient was started on IM calcitonin and 

status post 1 dose of zoledronic acid 4 mg IV.  Patient was admitted to the 

[facility] floor and consults requested with nephrology and oncology. Repeat lab 

work this morning revealed a calcium 11.2 and ionized calcium 6.1, AST 68, ALT 

55, alkaline phosphatase 224.  Parathyroid hormone intact was 3.4.





11/20: Repeat calcium this morning is 8.6.  Dr. Ramirez has recommended 

decreasing IV fluids to 75 mL per hour, discontinue calcitonin and follow-up on 

urine immunofixation.  Patient states she continues to have significant nausea. 

She did have an emesis last evening.  She thinks the nausea and vomiting has 

been related to the MS Contin which should be out of her system soon.  She 

denies having any bowel movement.  She does complain of abdominal pain in 

epigastric area.  Carafate ordered as well as a lipase level.  Physical therapy 

added.





11/21: Patient states that she is feeling okay today.  Her nausea is better but 

still there a little bit.  She denies abdominal pain.  She states she has walked

in her room twice.  She is complaining of constipation and milk of magnesia is 

available.  She states she yogurt last night and for breakfast this morning.  

Calcium 7.8, total bilirubin 0.4, AST 68, ALT 45, alkaline phosphatase 176.  No 

monoclonal paraprotein recognized on the immunofixation.  Plan to increase 

activity, increase oral intake, resolved constipation and plan for discharge 

home tomorrow.





11/22: Patient developed chest pain yesterday and d-dimer was elevated, patient 

was finally underwent a CT angiogram of the chest that was negative for 

pulmonary embolism but did show extensive metastatic disease in the bony thorax.

 Cardiology consult was added.  Echocardiogram was ordered.  Troponins have been

negative on 2 draws.  There is no bilateral pleural effusions and atelectasis at

the lung bases.  Calcium 7.2.  Potassium 3.1 and will be replaced, BUN 8 and 

creatinine 0.50.  Magnesium 1.9.  Patient is gradually increasing her oral 

intake.  No vomiting.  Patient has had a large bowel movement and refused to 

have abdominal x-rays.  Constipation has resolved.  Patient has been evaluated 

by physical therapy with recommendations for home care and 24 hour supervision. 

At the time of evaluation, patient continues to have chest pain in the 

midsternal area with increased pain with movement.  She states stretching does 

help it.  She denies any nausea or vomiting.  Toradol 2 doses will be ordered 

and Voltaren gel to be applied to her chest.  Patient will be monitored 

overnight and plan for discharge home tomorrow.








Review of Systems


Constitutional: Reports anorexia, Reports fatigue, Reports lethargy, Reports 

malaise, Reports poor appetite, Reports weakness, Denies chills, Denies fever


Eyes: denies blurred vision, denies pain


Ears, nose, mouth and throat: Denies headache, Denies nasal congestion, Denies 

nasal discharge, Denies sore throat, Denies vertigo


Cardiovascular: Reports chest pain, Denies edema, Denies lightheadedness, Denies

palpitations, Denies shortness of breath, Denies syncope


Respiratory: Denies cough, Denies cough with sputum, Denies dyspnea, Denies 

excessive sputum, Denies hemoptysis, Denies home oxygen, Denies wheezing


Gastrointestinal: Reports loss of appetite, Reports nausea, reports abdominal 

pain, Denies diarrhea, denies vomiting, reports constipation


Genitourinary: Denies dysuria, Denies hematuria, Denies urgency, Denies urinary 

frequency


Musculoskeletal: Reports gait dysfunction, Reports low back pain, Reports muscle

weakness, Denies myalgias


Integumentary: Denies pruritus, Denies rash


Neurological: Reports weakness, Denies change in mentation, Denies change in 

speech, Denies seizures, Denies syncope


Psychiatric: Reports anxiety, Denies depression


Endocrine: Denies fatigue, Denies weight change





Objective





- Vital Signs


Vital signs: 


                                   Vital Signs











Temp  98.1 F   11/22/19 11:20


 


Pulse  84   11/22/19 11:20


 


Resp  16   11/22/19 11:20


 


BP  108/64   11/22/19 11:20


 


Pulse Ox  97   11/22/19 11:20








                                 Intake & Output











 11/21/19 11/22/19 11/22/19





 18:59 06:59 18:59


 


Intake Total 650 640 


 


Output Total  200 


 


Balance 650 440 


 


Intake:   


 


  Intake, IV Titration 650 350 





  Amount   


 


    Sodium Chloride 0.9% 1, 650 350 





    000 ml @ 75 mls/hr IV .   





    [id number] Rx#:[id number]   


 


  Oral  290 


 


Output:   


 


  Urine  200 


 


Other:   


 


  Voiding Method Toilet  Toilet


 


  # Voids 1 2 














- Exam





Gen: This is a 54-year-old  female area patient is resting in bed and 

appears to be comfortable at this time.


HEENT: Head is atraumatic, normocephalic. Pupils equal, round. Sclerae is 

anicteric.  Oral mucous membranes are dry.


NECK: Supple. No JVD. No lymphadenopathy. No thyromegaly. 


LUNGS: Clear to auscultation. No wheezes or rhonchi.  No intercostal 

retractions.


HEART: Regular rate and rhythm. No murmur.  Chest wall tenderness


ABDOMEN: Soft. Bowel sounds are present. No masses.  No abdominal tenderness.


EXTREMITIES: No pedal edema.  No calf tenderness.


NEUROLOGICAL: Patient is awake, alert and oriented x3. Cranial nerves 2 through 

12 are grossly intact. 





- Labs


CBC & Chem 7: 


                                 11/19/19 07:40





                                 11/22/19 08:31


Labs: 


                  Abnormal Lab Results - Last 24 Hours (Table)











  11/21/19 11/22/19 Range/Units





  18:13 08:31 


 


D-Dimer  2.43 H   (<0.60)  mg/L FEU


 


Potassium   3.1 L  (3.5-5.1)  mmol/L


 


Chloride   109 H  ()  mmol/L


 


Creatinine   0.50 L  (0.52-1.04)  mg/dL


 


Calcium   7.2 L  (8.4-10.2)  mg/dL














Assessment and Plan


Plan: 





1.  Hypercalcemia secondary to malignancy with metastatic disease to the bone.  

Nephrology consult appreciated.  IV fluids decreased to 75 mL per hour, 

discontinue calcitonin, discontinue vitamin D.  Electrophoresis studies 

negative.





2.  Breast cancer with bone metastasis status post multiple surgical 

interventions.  Oncology consult.





3.  Paroxysmal atrial fibrillation.  Continue Toprol-XL 25 mg in the morning and

50 mg at bedtime, Xarelto 20 mg daily.





4.  Hypothyroidism.  Continue levothyroxine 175 g daily.





5.  Gastroesophageal reflux disease and GI prophylaxis.  Continue Protonix.





6.  Chronic pain syndrome.  Continue Voltaren gel, Robaxin 750 mg every 6 hours 

as needed, OxyContin 30 mg extended release every 12 hours, oxycodone 10 mg 

every 4 hours as needed.





7.  Seasonal ALLERGIES.  Continue Claritin.





8.  History of thyroid cancer.





9.  Generalized anxiety disorder.  Continue Xanax or 0.5 mg 4 times daily as 

needed.





10.  Elevated liver function tests secondary to liver steatosis.





11.  Nausea and vomiting most likely secondary to reaction to morphine.  Con

tinue Zofran, Pepcid, Carafate added.  Check lipase level





12.  Chest pain secondary to costochondritis.  All studies have been negative.  

Echocardiogram report is pending.  Cardiology consult appreciated.  Toradol 2 

doses will be ordered.  Voltaren gel to the chest wall.





Discharge plan: Return home with [facility] on Saturday.





Impression and plan of care have been directed as dictated by the signing 

physician.  Kadi Mulligan nurse practitioner acting as scribe for signing 

physician.

## 2019-11-23 VITALS — HEART RATE: 106 BPM

## 2019-11-23 VITALS — DIASTOLIC BLOOD PRESSURE: 75 MMHG | SYSTOLIC BLOOD PRESSURE: 116 MMHG | RESPIRATION RATE: 17 BRPM | TEMPERATURE: 97.5 F

## 2019-11-23 LAB
ANION GAP SERPL CALC-SCNC: 6 MMOL/L
BUN SERPL-SCNC: 9 MG/DL (ref 7–17)
CALCIUM SPEC-MCNC: 7.1 MG/DL (ref 8.4–10.2)
CHLORIDE SERPL-SCNC: 113 MMOL/L (ref 98–107)
CO2 SERPL-SCNC: 24 MMOL/L (ref 22–30)
GLUCOSE SERPL-MCNC: 106 MG/DL (ref 74–99)
MAGNESIUM SPEC-SCNC: 1.9 MG/DL (ref 1.6–2.3)
POTASSIUM SERPL-SCNC: 4.3 MMOL/L (ref 3.5–5.1)
SODIUM SERPL-SCNC: 143 MMOL/L (ref 137–145)

## 2019-11-23 RX ADMIN — ACETAMINOPHEN SCH MG: 500 TABLET ORAL at 09:08

## 2019-11-23 RX ADMIN — ACETAMINOPHEN SCH MG: 500 TABLET ORAL at 06:18

## 2019-11-23 RX ADMIN — ASPIRIN SCH EACH: 325 TABLET ORAL at 09:13

## 2019-11-23 RX ADMIN — LEVOTHYROXINE SODIUM SCH MCG: 100 TABLET ORAL at 06:18

## 2019-11-23 RX ADMIN — SUCRALFATE SCH GM: 1 TABLET ORAL at 09:10

## 2019-11-23 RX ADMIN — SUCRALFATE SCH GM: 1 TABLET ORAL at 13:00

## 2019-11-23 RX ADMIN — DOCUSATE SODIUM AND SENNOSIDES SCH EACH: 50; 8.6 TABLET ORAL at 09:08

## 2019-11-23 RX ADMIN — POTASSIUM CHLORIDE SCH MEQ: 20 TABLET, EXTENDED RELEASE ORAL at 09:08

## 2019-11-23 RX ADMIN — LORATADINE SCH MG: 10 TABLET ORAL at 09:09

## 2019-11-23 RX ADMIN — LEVOTHYROXINE SODIUM SCH MCG: 75 TABLET ORAL at 06:18

## 2019-11-23 RX ADMIN — OXYCODONE HYDROCHLORIDE SCH MG: 10 TABLET, FILM COATED, EXTENDED RELEASE ORAL at 09:06

## 2019-11-23 RX ADMIN — POLYETHYLENE GLYCOL 3350 SCH GM: 17 POWDER, FOR SOLUTION ORAL at 09:14

## 2019-11-23 RX ADMIN — METOPROLOL SUCCINATE SCH MG: 25 TABLET, EXTENDED RELEASE ORAL at 09:08

## 2019-11-23 RX ADMIN — POLYETHYLENE GLYCOL 3350 SCH GM: 17 POWDER, FOR SOLUTION ORAL at 09:16

## 2019-11-23 RX ADMIN — BACITRACIN ZINC, NEOMYCIN, POLYMYXIN B PRN APPLIC: 400; 3.5; 5 OINTMENT TOPICAL at 13:08

## 2019-11-23 NOTE — P.PN
Subjective





Patient is seen in follow-up for hypercalcemia.  Calcium level today is down to 

7.1.  No vomiting overnight.  Oral intake gradually improving.  Urine output is 

good.  Did develop chest pain yesterday but is better today.





Vital signs are stable.


General: The patient appeared well nourished and normally developed. 


HEENT: Head exam is unremarkable. Neck is without jugular venous distension.


LUNGS: Lungs are clear to auscultation and percussion. Breath sounds decreased.


HEART: Rate and Rhythm are regular. First and second heart sounds normal. No 

murmurs, rubs or gallops. 


ABDOMEN: Abdominal exam reveals normal bowel sounds. Non-tender and non-

distended. No evidence of peritonitis.


EXTREMITITES: No clubbing, cyanosis, or edema.





Objective





- Vital Signs


Vital signs: 


                                   Vital Signs











Temp  97.9 F   11/23/19 05:00


 


Pulse  76   11/23/19 05:00


 


Resp  16   11/23/19 05:00


 


BP  104/72   11/23/19 05:00


 


Pulse Ox  97   11/23/19 05:00








                                 Intake & Output











 11/22/19 11/23/19 11/23/19





 18:59 06:59 18:59


 


Intake Total 900 1490 


 


Output Total 200  


 


Balance 700 1490 


 


Weight 78.018 kg  


 


Intake:   


 


  Intake, IV Titration 900 650 





  Amount   


 


    Sodium Chloride 0.9% 1, 900 650 





    000 ml @ 75 mls/hr IV .   





    [id number] [facility] Rx#:[id number]   


 


  Oral  840 


 


Output:   


 


  Urine 200  


 


Other:   


 


  Voiding Method Toilet Toilet 


 


  # Voids 3 3 














- Labs


CBC & Chem 7: 


                                 11/19/19 07:40





                                 11/23/19 06:17


Labs: 


                  Abnormal Lab Results - Last 24 Hours (Table)











  11/22/19 11/23/19 Range/Units





  08:31 06:17 


 


Potassium  3.1 L   (3.5-5.1)  mmol/L


 


Chloride  109 H  113 H  ()  mmol/L


 


Creatinine  0.50 L   (0.52-1.04)  mg/dL


 


Glucose   106 H  (74-99)  mg/dL


 


Calcium  7.2 L  7.1 L  (8.4-10.2)  mg/dL














Assessment and Plan


Plan: 





Assessment:


1.  Hypercalcemia, non-parathyroid-induced.  Etiology is malignancy with bone 

metastasis as well as component of volume contraction.  Improved.  Status post 

calcitonin and Zometa.  PTH appropriately suppressed.  Vitamin D level 53.9.  

1,25 D3 18. ACE mildly elevated at 67.  No monoclonality noted on serum 

immunofixation.  Calcium today 7.1 however her albumin has also been low.  

Therefore her corrected calcium is a little bit higher.


2.  Breast cancer with bone metastasis.


3.  Benign hypertension.  Controlled.


4.  Hypokalemia from poor oral intake.  Better.





Plan:


Hep-Lock IV fluids.


Encouraged oral intake.


Repeat electrolytes in the morning.

## 2019-11-23 NOTE — P.DS
Providers


Date of admission: 


11/18/19 15:43





Expected date of discharge: 11/23/19


Attending physician: 


Josefina Roberto





Consults: 





                                        





11/18/19 15:43


Consult Physician Urgent 


   Consulting Provider: Wale Ramirez


   Consult Reason/Comments: Hypercalcemia


   Do you want consulting provider notified?: Yes


Consult Physician Urgent 


   Consulting Provider: Imani Carr


   Consult Reason/Comments: Breast cancer


   Do you want consulting provider notified?: Yes





11/21/19 17:26


Consult Physician Urgent 


   Consulting Provider: Breanna Wang


   Consult Reason/Comments: chest pain


   Do you want consulting provider notified?: Yes











Primary care physician: 


Jhony LOU \[facility]\"" Course: 








This is a 54-year-old  female patient of Valdemar Alvarez NP with past 

medical history of breast cancer status post lumpectomy in 2011 with recurrence 

of invasive ductal carcinoma stage III grade IIIa with lumpectomy January 2014 

with bone metastasis and multiple pathologic compression fractures.  Status post

cervical fusion, right femur nail and titanium david all positive for cancer in 

June 2019, kyphoplasty L3 followed by kyphoplasty T12, L1, L5-S1 with 

radiofrequency ablation October 2019, history of thyroid cancer status post 

lobectomy and radiation therapy, chronic atrial fibrillation status post 

ablation on Xarelto, hypothyroidism, chronic pain syndrome, gastroesophageal 

reflux disease, generalized anxiety disorder.  Patient's last chemotherapy was 

performed on 10/16/2019.  Last radiation to the cervical, right femur and lumbar

areas completed on 07/26/2019.  PET scan on 11/09/2019 revealed osseous 

metastatic disease. Patient has [facility] care in place.  Patient states that

she was at [facility] from October 24 through the November 5 at which 

time she underwent lumbar spine procedures.  She states she also has a left 

scapular fracture and rib fractures.  Old fractures are pathologic related to 

cancer with metastatic disease.  She had previously received all her care at 

[facility] and as of Friday switch to Dr. Carr.  Patient received a call

that she needed to come into the hospital as her calcium was elevated.  Patient 

is complaining of numbness to her face that she has had for a couple weeks along

with nausea and vomiting.  Patient is complaining of significant pain all over 

her body.  Nurse relates that at 3 AM patient became very anxious and agitated. 

Her  normally gives her a Xanax at 3 in the morning for this reason and 

instructions have been provided to do the same year.  Patient says she has also 

become very weak and she needs to build ambulate to the bathroom in order for 

her  to care for her at home.





Patient came into [facility] emergency center for evaluation.  

Calcium was 14.9, ionized calcium 8.0, AST 88, ALT 57, alkaline phosphatase 235,

troponin negative, albumin 4.2, electrolytes within normal limits, BUN 20 

creatinine 0.73.  INR 1.0.  WBC 4.5, hemoglobin 12, platelet count 213.  EKG in 

normal sinus rhythm.  Patient was afebrile, blood pressure 101/65, heart rate in

the 80s, pulse ox 95% on room air.  Patient was started on IM calcitonin and 

status post 1 dose of zoledronic acid 4 mg IV.  Patient was admitted to the 

[facility] floor and consults requested with nephrology and oncology. Repeat lab 

work this morning revealed a calcium 11.2 and ionized calcium 6.1, AST 68, ALT 

55, alkaline phosphatase 224.  Parathyroid hormone intact was 3.4.





11/20: Repeat calcium this morning is 8.6.  Dr. Ramirez has recommended 

decreasing IV fluids to 75 mL per hour, discontinue calcitonin and follow-up on 

urine immunofixation.  Patient states she continues to have significant nausea. 

She did have an emesis last evening.  She thinks the nausea and vomiting has 

been related to the MS Contin which should be out of her system soon.  She 

denies having any bowel movement.  She does complain of abdominal pain in 

epigastric area.  Carafate ordered as well as a lipase level.  Physical therapy 

added.





11/21: Patient states that she is feeling okay today.  Her nausea is better but 

still there a little bit.  She denies abdominal pain.  She states she has walked

in her room twice.  She is complaining of constipation and milk of magnesia is 

available.  She states she yogurt last night and for breakfast this morning.  

Calcium 7.8, total bilirubin 0.4, AST 68, ALT 45, alkaline phosphatase 176.  No 

monoclonal paraprotein recognized on the immunofixation.  Plan to increase 

activity, increase oral intake, resolved constipation and plan for discharge 

home tomorrow.





11/22: Patient developed chest pain yesterday and d-dimer was elevated, patient 

was finally underwent a CT angiogram of the chest that was negative for 

pulmonary embolism but did show extensive metastatic disease in the bony thorax.

 Cardiology consult was added.  Echocardiogram was ordered.  Troponins have been

negative on 2 draws.  There is no bilateral pleural effusions and atelectasis at

the lung bases.  Calcium 7.2.  Potassium 3.1 and will be replaced, BUN 8 and 

creatinine 0.50.  Magnesium 1.9.  Patient is gradually increasing her oral 

intake.  No vomiting.  Patient has had a large bowel movement and refused to 

have abdominal x-rays.  Constipation has resolved.  Patient has been evaluated 

by physical therapy with recommendations for home care and 24 hour supervision. 

At the time of evaluation, patient continues to have chest pain in the 

midsternal area with increased pain with movement.  She states stretching does 

help it.  She denies any nausea or vomiting.  Toradol 2 doses will be ordered 

and Voltaren gel to be applied to her chest.  Patient will be monitored 

overnight and plan for discharge home tomorrow.








11/23: Patient's doing much better, no nausea vomiting, appetite and regular 

diet has been tolerated, chest wall pain is better with Voltaren gel, was given 

few doses of Toradol, she keeps her pain medication post discharge without any 

increase in requirements, patient does not have any lightheadedness nausea or 

vomiting this time, and is stable for discharge today.  No new recommendations 

from nephrology, patient wants to just see in the college is 40 hyperkalemia, 

vitamin D discontinued on discharge as well as Tums, patient to have have 

calcium monitored as an outpatient.  And can restart vitamin D later with levels

at that time with adjustment of dosing





DISCHARGE DIAGNOSES WITH PLAN OF CARE





1.  Hypercalcemia secondary to malignancy with metastatic disease to the bone 

resolved.  Nephrology consult appreciated.   Status post Zometa treatments 

discontinue calcitonin, discontinue vitamin D.  Electrophoresis studies 

negative.





2.  Breast cancer with bone metastasis status post multiple surgical 

interventions.  Oncology  Dr. Carr as an outpatient





3.  Paroxysmal atrial fibrillation.  Continue Toprol-XL 25 mg in the morning and

50 mg at bedtime, Xarelto 20 mg daily.





4.  Hypothyroidism.  Continue levothyroxine 175 g daily.





5.  Gastroesophageal reflux disease and GI prophylaxis.  Continue Protonix.





6.  Chronic pain syndrome.  Continue Voltaren gel, Robaxin 750 mg every 6 hours 

as needed, OxyContin 30 mg extended release every 12 hours, oxycodone 10 mg 

every 4 hours as needed.  Pain med per pain physician Dr. Carr no additional 

requirements post discharge





7.  Seasonal ALLERGIES.  Continue Claritin.





8.  History of thyroid cancer.





9.  Generalized anxiety disorder.  Continue Xanax or 0.5 mg 4 times daily as 

needed.





10.  Elevated liver function tests secondary to liver steatosis.





11.  Nausea and vomiting most likely secondary to reaction to morphine symptoms 

resolved on discharge.  Continue Zofran, Pepcid, Carafate added.  Check lipase 

level





12.  Chest pain secondary to costochondritis.  All studies have been negative.  

Echocardiogram report is pending.  Cardiology consult appreciated.  Toradol 2 

doses will be ordered.  Voltaren gel to the chest wall.





Discharge plan: Return home with [facility] on Saturday, stable.


Patient Condition at Discharge: Good





Plan - Discharge Summary


Discharge Rx Participant: No


New Discharge Prescriptions: 


New


   Sucralfate [Carafate] 1 gm PO AC-TID #90 tab


   oxyCODONE ER [OxyCONTIN] 30 mg PO Q12HR  tab.er.12h





Continue


   Omeprazole 20 mg PO DAILY


   Loratadine 10 mg PO DAILY


   Levothyroxine Sodium [Synthroid] 175 mcg PO DAILY@0500


   ALPRAZolam [Xanax] 0.5 mg PO QID PRN


     PRN Reason: Anxiety


   oxyCODONE HCL [Roxicodone] 10 mg PO Q4H PRN


     PRN Reason: Pain


   Methocarbamol [Robaxin] 750 mg PO Q6H PRN


     PRN Reason: Spasms


   Rivaroxaban [Xarelto] 20 mg PO W/SUPPER


   HYDROmorphone HCL 2 mg PO Q6H PRN


     PRN Reason: Breakthrough Pain


   Potassium Chloride [Klor-Con 20] 20 meq PO DAILY


   Diclofenac Sodium [Voltaren Gel] 2 gram TOPICAL QID PRN


     PRN Reason: Pain


   Metoprolol Succinate (ER) [Toprol XL] 50 mg PO HS


   Metoprolol Succinate (ER) [Toprol XL] 25 mg PO DAILY


   Sennosides [Senna] 8.6 mg PO BID


   Polyethylene Glycol 3350 [Miralax] 17 gm PO DAILY


   Acetaminophen Tab [Tylenol] 1,000 mg PO Q8H





Discontinued


   Calcium Carbonate [Tums] 500 - 1,000 mg PO QID PRN


     PRN Reason: Heartburn


   Ergocalciferol [Vitamin D2] 50,000 unit PO MACHADO


   Morphine Sulfate ER [Ms Contin] 30 mg PO Q12HR


   Colace   50 mg PO BID


Discharge Medication List





Levothyroxine Sodium [Synthroid] 175 mcg PO DAILY@0500 02/18/18 [History]


Loratadine 10 mg PO DAILY 02/18/18 [History]


Omeprazole 20 mg PO DAILY 02/18/18 [History]


ALPRAZolam [Xanax] 0.5 mg PO QID PRN 12/04/18 [History]


Acetaminophen Tab [Tylenol] 1,000 mg PO Q8H 11/18/19 [History]


Diclofenac Sodium [Voltaren Gel] 2 gram TOPICAL QID PRN 11/18/19 [History]


HYDROmorphone HCL 2 mg PO Q6H PRN 11/18/19 [History]


Methocarbamol [Robaxin] 750 mg PO Q6H PRN 11/18/19 [History]


Metoprolol Succinate (ER) [Toprol XL] 25 mg PO DAILY 11/18/19 [History]


Metoprolol Succinate (ER) [Toprol XL] 50 mg PO HS 11/18/19 [History]


Polyethylene Glycol 3350 [Miralax] 17 gm PO DAILY 11/18/19 [History]


Potassium Chloride [Klor-Con 20] 20 meq PO DAILY 11/18/19 [History]


Rivaroxaban [Xarelto] 20 mg PO W/SUPPER 11/18/19 [History]


Sennosides [Senna] 8.6 mg PO BID 11/18/19 [History]


oxyCODONE HCL [Roxicodone] 10 mg PO Q4H PRN 11/18/19 [History]


Sucralfate [Carafate] 1 gm PO AC-TID #90 tab 11/23/19 [Rx]


oxyCODONE ER [OxyCONTIN] 30 mg PO Q12HR  tab.er.12h 11/23/19 [Rx]








Follow up Appointment(s)/Referral(s): 


Luis Alvarez NPC [REFERRING] - 1 Week


[facility], [NON-STAFF] - 1 Week


Imani Carr MD [STAFF PHYSICIAN] - 2 Weeks


Patient Instructions/Handouts:  Sucralfate (By mouth), Hypercalcemia (DC)


Activity/Diet/Wound Care/Special Instructions: 


activity limited until seen by the Dr.


Diet as tolerated





Discharge Disposition: HOME WITH HOME HEALTH SERVICES


Care Plan Goals (MU): 


please give patient her home med prilosec that is in med bin box before 

discharged

## 2019-12-04 ENCOUNTER — HOSPITAL ENCOUNTER (EMERGENCY)
Dept: HOSPITAL 47 - EC | Age: 54
Discharge: HOME | End: 2019-12-04
Payer: COMMERCIAL

## 2019-12-04 VITALS
HEART RATE: 92 BPM | TEMPERATURE: 98 F | SYSTOLIC BLOOD PRESSURE: 130 MMHG | RESPIRATION RATE: 18 BRPM | DIASTOLIC BLOOD PRESSURE: 84 MMHG

## 2019-12-04 DIAGNOSIS — Z79.899: ICD-10-CM

## 2019-12-04 DIAGNOSIS — M79.7: ICD-10-CM

## 2019-12-04 DIAGNOSIS — I48.20: ICD-10-CM

## 2019-12-04 DIAGNOSIS — Z88.5: ICD-10-CM

## 2019-12-04 DIAGNOSIS — Z71.1: ICD-10-CM

## 2019-12-04 DIAGNOSIS — G89.29: ICD-10-CM

## 2019-12-04 DIAGNOSIS — K21.9: ICD-10-CM

## 2019-12-04 DIAGNOSIS — Z98.1: ICD-10-CM

## 2019-12-04 DIAGNOSIS — Z79.891: ICD-10-CM

## 2019-12-04 DIAGNOSIS — E89.0: ICD-10-CM

## 2019-12-04 DIAGNOSIS — M54.9: Primary | ICD-10-CM

## 2019-12-04 DIAGNOSIS — Z92.21: ICD-10-CM

## 2019-12-04 DIAGNOSIS — Z85.3: ICD-10-CM

## 2019-12-04 DIAGNOSIS — Z79.01: ICD-10-CM

## 2019-12-04 DIAGNOSIS — Z92.3: ICD-10-CM

## 2019-12-04 DIAGNOSIS — Z88.1: ICD-10-CM

## 2019-12-04 DIAGNOSIS — Z88.8: ICD-10-CM

## 2019-12-04 DIAGNOSIS — Z85.850: ICD-10-CM

## 2019-12-04 DIAGNOSIS — R00.0: ICD-10-CM

## 2019-12-04 DIAGNOSIS — Z79.890: ICD-10-CM

## 2019-12-04 DIAGNOSIS — C79.51: ICD-10-CM

## 2019-12-04 DIAGNOSIS — Z98.890: ICD-10-CM

## 2019-12-04 LAB
ALBUMIN SERPL-MCNC: 4.3 G/DL (ref 3.5–5)
ALP SERPL-CCNC: 333 U/L (ref 38–126)
ALT SERPL-CCNC: 91 U/L (ref 9–52)
ANION GAP SERPL CALC-SCNC: 12 MMOL/L
AST SERPL-CCNC: 145 U/L (ref 14–36)
BASOPHILS # BLD MANUAL: 0.06 K/UL (ref 0–0.2)
BUN SERPL-SCNC: 12 MG/DL (ref 7–17)
CALCIUM SPEC-MCNC: 9.5 MG/DL (ref 8.4–10.2)
CELLS COUNTED: 200
CHLORIDE SERPL-SCNC: 106 MMOL/L (ref 98–107)
CK SERPL-CCNC: 114 U/L (ref 30–135)
CO2 SERPL-SCNC: 22 MMOL/L (ref 22–30)
EOSINOPHIL # BLD MANUAL: 0.06 K/UL (ref 0–0.7)
ERYTHROCYTE [DISTWIDTH] IN BLOOD BY AUTOMATED COUNT: 3.85 M/UL (ref 3.8–5.4)
ERYTHROCYTE [DISTWIDTH] IN BLOOD: 18.1 % (ref 11.5–15.5)
GLUCOSE SERPL-MCNC: 101 MG/DL (ref 74–99)
HCT VFR BLD AUTO: 36.4 % (ref 34–46)
HGB BLD-MCNC: 11.7 GM/DL (ref 11.4–16)
LYMPHOCYTES # BLD MANUAL: 0.47 K/UL (ref 1–4.8)
MAGNESIUM SPEC-SCNC: 1.9 MG/DL (ref 1.6–2.3)
MCH RBC QN AUTO: 30.3 PG (ref 25–35)
MCHC RBC AUTO-ENTMCNC: 32.1 G/DL (ref 31–37)
MCV RBC AUTO: 94.6 FL (ref 80–100)
METAMYELOCYTES # BLD: 0.18 K/UL
MONOCYTES # BLD MANUAL: 0.47 K/UL (ref 0–1)
MYELOCYTES # BLD MANUAL: 0.3 K/UL
NEUTROPHILS NFR BLD MANUAL: 69 %
NEUTS SEG # BLD MANUAL: 4.4 K/UL (ref 1.3–7.7)
PLATELET # BLD AUTO: 157 K/UL (ref 150–450)
POTASSIUM SERPL-SCNC: 4.4 MMOL/L (ref 3.5–5.1)
PROT SERPL-MCNC: 7.5 G/DL (ref 6.3–8.2)
SODIUM SERPL-SCNC: 140 MMOL/L (ref 137–145)
WBC # BLD AUTO: 5.9 K/UL (ref 3.8–10.6)

## 2019-12-04 PROCEDURE — 82550 ASSAY OF CK (CPK): CPT

## 2019-12-04 PROCEDURE — 96374 THER/PROPH/DIAG INJ IV PUSH: CPT

## 2019-12-04 PROCEDURE — 36415 COLL VENOUS BLD VENIPUNCTURE: CPT

## 2019-12-04 PROCEDURE — 96375 TX/PRO/DX INJ NEW DRUG ADDON: CPT

## 2019-12-04 PROCEDURE — 82330 ASSAY OF CALCIUM: CPT

## 2019-12-04 PROCEDURE — 85025 COMPLETE CBC W/AUTO DIFF WBC: CPT

## 2019-12-04 PROCEDURE — 96376 TX/PRO/DX INJ SAME DRUG ADON: CPT

## 2019-12-04 PROCEDURE — 83735 ASSAY OF MAGNESIUM: CPT

## 2019-12-04 PROCEDURE — 99283 EMERGENCY DEPT VISIT LOW MDM: CPT

## 2019-12-04 PROCEDURE — 80053 COMPREHEN METABOLIC PANEL: CPT

## 2019-12-04 PROCEDURE — 96361 HYDRATE IV INFUSION ADD-ON: CPT

## 2019-12-04 RX ADMIN — ONDANSETRON STA MG: 2 INJECTION INTRAMUSCULAR; INTRAVENOUS at 18:43

## 2019-12-04 RX ADMIN — ONDANSETRON STA MG: 2 INJECTION INTRAMUSCULAR; INTRAVENOUS at 22:04

## 2019-12-04 NOTE — ED
General Adult HPI





- General


Source: patient


Mode of arrival: wheelchair


Limitations: no limitations





<Russ Drew - Last Filed: 19 16:56>





<Jose A Jordan - Last Filed: 19 22:59>





- General


Chief complaint: Recheck/Abnormal Lab/Rx


Stated complaint: ABNORMAL LABS, PAIN, Ca patient





- History of Present Illness


Initial comments: 





54-year-old female presents emergency apartment it for generalized body pain.  

Patient was sent from Dr. Rivas office for possible abnormal labs.  They're 

concerned about her calcium level secondary to her diffuse body cramping, pain. 

Patient states that she does have chronic pain issues she's had multiple 

surgeries in the past.  Patient states she started oral chemo and had a recent 

injection.  Patient states that she does not know her calcium level though they 

believe it is elevated.  Patient reports no fevers or chills.  Patient states 

that she has breast cancer with metastasis to bone. (Russ Drew)





- Related Data


                                Home Medications











 Medication  Instructions  Recorded  Confirmed


 


RX: Levothyroxine Sodium 175 mcg PO DAILY@0500 18





[Synthroid]   


 


RX: Loratadine 10 mg PO DAILY 18


 


RX: Omeprazole 20 mg PO DAILY 18


 


RX: ALPRAZolam [Xanax] 0.5 mg PO QID PRN 18


 


RX: Acetaminophen Tab [Tylenol] 1,000 mg PO Q8H 19


 


RX: Diclofenac Sodium [Voltaren 2 gram TOPICAL QID PRN 19





Gel]   


 


RX: HYDROmorphone HCL 2 mg PO Q6H PRN 19


 


RX: Methocarbamol [Robaxin] 750 mg PO Q6H PRN 19


 


RX: Metoprolol Succinate (ER) 25 mg PO DAILY 19





[Toprol XL]   


 


RX: Metoprolol Succinate (ER) 50 mg PO HS 19





[Toprol XL]   


 


RX: Polyethylene Glycol 3350 17 gm PO DAILY 19





[Miralax]   


 


RX: Potassium Chloride [Klor-Con 20 meq PO DAILY 19





20]   


 


RX: Rivaroxaban [Xarelto] 20 mg PO W/SUPPER 19


 


RX: Sennosides [Senna] 8.6 mg PO BID 19


 


RX: oxyCODONE HCL [Roxicodone] 10 mg PO Q4H PRN 19








                                  Previous Rx's











 Medication  Instructions  Recorded


 


RX: Sucralfate [Carafate] 1 gm PO AC-TID #90 tab 19


 


RX: oxyCODONE ER [OxyCONTIN] 30 mg PO Q12HR  tab.er.12h 19











                                    Allergies











Allergy/AdvReac Type Severity Reaction Status Date / Time


 


gabapentin Allergy  Rash/Hives Verified 19 14:17


 


morphine [From MS Contin] Allergy  Nausea & Verified 19 16:51





   Vomiting &  





   Diarrhea  


 


vancomycin Allergy  Rash/Hives Verified 19 14:17














Review of Systems


ROS Other: All systems not noted in ROS Statement are negative.





<Russ Drew - Last Filed: 19 16:56>


ROS Other: All systems not noted in ROS Statement are negative.





<Jose A Jordan - Last Filed: 19 22:59>


ROS Statement: 


Those systems with pertinent positive or pertinent negative responses have been 

documented in the HPI.








Past Medical History


Past Medical History: Atrial Fibrillation, Cancer, Fibromyalgia, GERD/Reflux, 

Thyroid Disorder


Additional Past Medical History / Comment(s): breast cancer status post 

lumpectomy in  with recurrence of invasive ductal carcinoma stage III grade 

IIIa with lumpectomy 2014 with bone metastasis and multiple pathologic 

compression fractures.  Status post cervical fusion, right femur nail and 

titanium david all positive for cancer in 2019, kyphoplasty L3 followed by 

kyphoplasty T12, L1, L5-S1 with radiofrequency ablation 2019, history of

thyroid cancer status post lobectomy and radiation therapy, chronic atrial 

fibrillation status post ablation on Xarelto, hypothyroidism, chronic pain 

syndrome, gastroesophageal reflux disease, generalized anxiety disorder.


History of Any Multi-Drug Resistant Organisms: None Reported


Past Surgical History: Ablation, Back Surgery, Cholecystectomy, Hysterectomy, 

Orthopedic Surgery


Additional Past Surgical History / Comment(s): lumpectomy left breast x3, 

lobectomy thyroid, c5 c6 c7 fusion, bone marrow biopsy, bar/nail in right thigh 

and plate in right arm, ablation on heart 2013, gallstone removed from bile du

ct, left breast re-incision for clear margins in 2014,port plcement, 2015 had 

port removed, kyphoplasty on back x3 and radiofrequncy ablation to back,


Past Anesthesia/Blood Transfusion Reactions: Previous Problems w/ Anesthesia


Additional Past Anesthesia/Blood Transfusion Reaction / Comment(s): Hard time 

waking up and hypothermia


Past Psychological History: No Psychological Hx Reported


Smoking Status: Never smoker


Past Alcohol Use History: None Reported


Past Drug Use History: None Reported





- Past Family History


  ** Son(s)


History Unknown: Yes


Additional Family Medical History / Comment(s): Small nodule on thyroid, no 

other history





  ** Father


Additional Family Medical History / Comment(s): Patient does not know any 

history on her father.





  ** Mother


Additional Family Medical History / Comment(s): Mother  at age 83 from a 

perforated bowel with fistula to the bladder, COPD history.





  ** Brother(s)


Additional Family Medical History / Comment(s): Patient has 1 brother that had 

part of his colon removed.  Patient does not have any sisters.





<Russ Drew M - Last Filed: 19 16:56>





General Exam


Limitations: no limitations





<Russ Drew M - Last Filed: 19 16:56>


General appearance: alert, anxious


Head exam: Present: atraumatic, normocephalic, normal inspection


Eye exam: Present: normal appearance, PERRL, EOMI.  Absent: scleral icterus, 

conjunctival injection, periorbital swelling


ENT exam: Present: mucous membranes dry


Neck exam: Present: normal inspection.  Absent: tenderness, meningismus, 

lymphadenopathy


Respiratory exam: Present: normal lung sounds bilaterally.  Absent: respiratory 

distress, wheezes, rales, rhonchi, stridor


Cardiovascular Exam: Present: normal rhythm, tachycardia, normal heart sounds.  

Absent: systolic murmur, diastolic murmur, rubs, gallop, clicks


GI/Abdominal exam: Present: soft, normal bowel sounds.  Absent: distended, 

tenderness, guarding, rebound, rigid


Extremities exam: Present: normal inspection, full ROM, normal capillary refill.

 Absent: tenderness, pedal edema, joint swelling, calf tenderness


Back exam: Present: normal inspection


Neurological exam: Present: alert, oriented X3, CN II-XII intact


Psychiatric exam: Present: anxious


Skin exam: Present: warm, dry, intact, normal color.  Absent: rash





<Jose A Jordan - Last Filed: 19 22:59>





- General Exam Comments


Initial Comments: 





This is a well-developed well-nourished awake alert anxious appearing female 

(Jose A Jordan)





Course





<Jose A Jordan - Last Filed: 19 22:59>


                                   Vital Signs











  19





  16:48 18:58 21:04


 


Temperature 98.0 F  


 


Pulse Rate 104 H 90 


 


Respiratory 20 19 





Rate   


 


Blood Pressure 116/78 122/72 122/72


 


O2 Sat by Pulse 96 96 100





Oximetry   














  19





  22:11


 


Temperature 98 F


 


Pulse Rate 92


 


Respiratory 18





Rate 


 


Blood Pressure 130/84


 


O2 Sat by Pulse 96





Oximetry 














- Reevaluation(s)


Reevaluation #1: 





19 17:25


Patient was started with ATP she presents with complaints of generalized body 

pain chest pain bone pain 20/10 in severity she was sent from Dr. Carr's office

for this.  She states her calcium has been high in the past and was low on a 

blood test on about 2 weeks ago.  She has no idea what level it is at this time.

 She also complains of a 9 pound weight loss last 2 weeks did decrease oral i

ntake.  In addition to the above finding she's very anxious. (Jose A Jordan)


Reevaluation #2: 





19 22:02


Patient was given IV fluids as well as initially oral medication she declined 

repeat dosing of that a lot of.  She was still having a lot of pain she findings

except fluids and IV medication.  Calcium levels are within normal limits. 

(Jose A Jordan)





Medical Decision Making





- Lab Data


Result diagrams: 


                                 19 17:31





                                 19 17:31





<Joes A Jordan - Last Filed: 19 22:59>





- Medical Decision Making





The patient did finally get relief and resolution of her exacerbation of chronic

pain after IV hydration and the meds or given.  After long discussion with her 

and her sister who was present patient is comfortable with being discharged to 

resume her home medications.  She was counseled on staying hydrated which she 

admitted she does not do very well. (Jose A Jordan)





- Lab Data


                                   Lab Results











  19 Range/Units





  17:31 17:31 


 


WBC  5.9   (3.8-10.6)  k/uL


 


RBC  3.85   (3.80-5.40)  m/uL


 


Hgb  11.7   (11.4-16.0)  gm/dL


 


Hct  36.4   (34.0-46.0)  %


 


MCV  94.6   (80.0-100.0)  fL


 


MCH  30.3   (25.0-35.0)  pg


 


MCHC  32.1   (31.0-37.0)  g/dL


 


RDW  18.1 H   (11.5-15.5)  %


 


Plt Count  157   (150-450)  k/uL


 


Neutrophils % (Manual)  69   %


 


Band Neutrophils %  6   %


 


Lymphocytes % (Manual)  8   %


 


Monocytes % (Manual)  8   %


 


Eosinophils % (Manual)  1   %


 


Basophils % (Manual)  1   %


 


Metamyelocytes %  3   %


 


Myelocytes %  5   %


 


Neutrophils # (Manual)  4.40   (1.3-7.7)  k/uL


 


Lymphocytes # (Manual)  0.47 L   (1.0-4.8)  k/uL


 


Monocytes # (Manual)  0.47   (0-1.0)  k/uL


 


Eosinophils # (Manual)  0.06   (0-0.7)  k/uL


 


Basophils # (Manual)  0.06   (0-0.2)  k/uL


 


Metamyelocytes # (Man)  0.18 H   (0)  k/uL


 


Myelocytes # (Manual)  0.30 H   (0)  k/uL


 


Nucleated RBCs  17 H   (0-0)  /100 WBC


 


Polychromasia  Present   


 


Hypochromasia  Slight   


 


Poikilocytosis  Slight   


 


Anisocytosis  Slight   


 


Macrocytosis  Slight   


 


Sodium   140  (137-145)  mmol/L


 


Potassium   4.4  (3.5-5.1)  mmol/L


 


Chloride   106  ()  mmol/L


 


Carbon Dioxide   22  (22-30)  mmol/L


 


Anion Gap   12  mmol/L


 


BUN   12  (7-17)  mg/dL


 


Creatinine   0.65  (0.52-1.04)  mg/dL


 


Est GFR (CKD-EPI)AfAm   >90  (>60 ml/min/1.73 sqM)  


 


Est GFR (CKD-EPI)NonAf   >90  (>60 ml/min/1.73 sqM)  


 


Glucose   101 H  (74-99)  mg/dL


 


Calcium   9.5  (8.4-10.2)  mg/dL


 


Ionized Calcium Tiffany   4.8  (4.5-5.3)  mg/dL


 


Magnesium   1.9  (1.6-2.3)  mg/dL


 


Total Bilirubin   0.7  (0.2-1.3)  mg/dL


 


AST   145 H  (14-36)  U/L


 


ALT   91 H  (9-52)  U/L


 


Alkaline Phosphatase   333 H  ()  U/L


 


Creatine Kinase   114  ()  U/L


 


Total Protein   7.5  (6.3-8.2)  g/dL


 


Albumin   4.3  (3.5-5.0)  g/dL














Disposition





<Russ Drew - Last Filed: 19 16:56>


Is patient prescribed a controlled substance at d/c from ED?: No





<Jose A Jordan - Last Filed: 19 22:59>


Clinical Impression: 


 Exacerbation of chronic back pain, Feared condition not demonstrated





Disposition: HOME SELF-CARE


Condition: Good


Instructions (If sedation given, give patient instructions):  Chronic Back Pain 

(DC)


Additional Instructions: 


Stay well-hydrated, continue with your current pain medication


Referrals: 


Jhony Mercado MD [Primary Care Provider] - 1-2 days

## 2020-01-10 ENCOUNTER — HOSPITAL ENCOUNTER (INPATIENT)
Dept: HOSPITAL 47 - EC | Age: 55
LOS: 6 days | Discharge: HOSPICE | DRG: 438 | End: 2020-01-16
Attending: FAMILY MEDICINE | Admitting: FAMILY MEDICINE
Payer: COMMERCIAL

## 2020-01-10 VITALS — BODY MASS INDEX: 29.7 KG/M2

## 2020-01-10 DIAGNOSIS — T45.1X5A: ICD-10-CM

## 2020-01-10 DIAGNOSIS — Z90.49: ICD-10-CM

## 2020-01-10 DIAGNOSIS — N18.1: ICD-10-CM

## 2020-01-10 DIAGNOSIS — I12.9: ICD-10-CM

## 2020-01-10 DIAGNOSIS — F41.1: ICD-10-CM

## 2020-01-10 DIAGNOSIS — Z90.710: ICD-10-CM

## 2020-01-10 DIAGNOSIS — Z98.1: ICD-10-CM

## 2020-01-10 DIAGNOSIS — Z79.890: ICD-10-CM

## 2020-01-10 DIAGNOSIS — F32.9: ICD-10-CM

## 2020-01-10 DIAGNOSIS — Z85.3: ICD-10-CM

## 2020-01-10 DIAGNOSIS — N17.0: ICD-10-CM

## 2020-01-10 DIAGNOSIS — Z88.1: ICD-10-CM

## 2020-01-10 DIAGNOSIS — Z51.5: ICD-10-CM

## 2020-01-10 DIAGNOSIS — C50.919: ICD-10-CM

## 2020-01-10 DIAGNOSIS — E86.0: ICD-10-CM

## 2020-01-10 DIAGNOSIS — Z88.5: ICD-10-CM

## 2020-01-10 DIAGNOSIS — R01.1: ICD-10-CM

## 2020-01-10 DIAGNOSIS — R79.89: ICD-10-CM

## 2020-01-10 DIAGNOSIS — Z79.891: ICD-10-CM

## 2020-01-10 DIAGNOSIS — K21.9: ICD-10-CM

## 2020-01-10 DIAGNOSIS — M79.7: ICD-10-CM

## 2020-01-10 DIAGNOSIS — E87.6: ICD-10-CM

## 2020-01-10 DIAGNOSIS — M84.512A: ICD-10-CM

## 2020-01-10 DIAGNOSIS — Z79.01: ICD-10-CM

## 2020-01-10 DIAGNOSIS — Z85.850: ICD-10-CM

## 2020-01-10 DIAGNOSIS — Z88.8: ICD-10-CM

## 2020-01-10 DIAGNOSIS — D61.810: ICD-10-CM

## 2020-01-10 DIAGNOSIS — I95.9: ICD-10-CM

## 2020-01-10 DIAGNOSIS — K85.90: Primary | ICD-10-CM

## 2020-01-10 DIAGNOSIS — G89.4: ICD-10-CM

## 2020-01-10 DIAGNOSIS — C79.31: ICD-10-CM

## 2020-01-10 DIAGNOSIS — E89.0: ICD-10-CM

## 2020-01-10 DIAGNOSIS — Z92.3: ICD-10-CM

## 2020-01-10 DIAGNOSIS — I48.0: ICD-10-CM

## 2020-01-10 DIAGNOSIS — C79.51: ICD-10-CM

## 2020-01-10 DIAGNOSIS — Z79.899: ICD-10-CM

## 2020-01-10 DIAGNOSIS — K75.81: ICD-10-CM

## 2020-01-10 DIAGNOSIS — E83.52: ICD-10-CM

## 2020-01-10 DIAGNOSIS — Z82.5: ICD-10-CM

## 2020-01-10 LAB
ALBUMIN SERPL-MCNC: 4.2 G/DL (ref 3.5–5)
ALP SERPL-CCNC: 203 U/L (ref 38–126)
ALT SERPL-CCNC: 75 U/L (ref 4–34)
ANION GAP SERPL CALC-SCNC: 13 MMOL/L
AST SERPL-CCNC: 177 U/L (ref 14–36)
BUN SERPL-SCNC: 41 MG/DL (ref 7–17)
CALCIUM SPEC-MCNC: 18.1 MG/DL (ref 8.4–10.2)
CELLS COUNTED: 200
CHLORIDE SERPL-SCNC: 99 MMOL/L (ref 98–107)
CK SERPL-CCNC: 38 U/L (ref 30–135)
CO2 SERPL-SCNC: 29 MMOL/L (ref 22–30)
ERYTHROCYTE [DISTWIDTH] IN BLOOD BY AUTOMATED COUNT: 3.41 M/UL (ref 3.8–5.4)
ERYTHROCYTE [DISTWIDTH] IN BLOOD: 19.8 % (ref 11.5–15.5)
GLUCOSE SERPL-MCNC: 97 MG/DL (ref 74–99)
HCT VFR BLD AUTO: 32.3 % (ref 34–46)
HGB BLD-MCNC: 10.3 GM/DL (ref 11.4–16)
MCH RBC QN AUTO: 30.3 PG (ref 25–35)
MCHC RBC AUTO-ENTMCNC: 31.9 G/DL (ref 31–37)
MCV RBC AUTO: 94.9 FL (ref 80–100)
NEUTROPHILS NFR BLD MANUAL: 53 %
PLATELET # BLD AUTO: 68 K/UL (ref 150–450)
POTASSIUM SERPL-SCNC: 3.5 MMOL/L (ref 3.5–5.1)
PROT SERPL-MCNC: 7.6 G/DL (ref 6.3–8.2)
SODIUM SERPL-SCNC: 141 MMOL/L (ref 137–145)

## 2020-01-10 PROCEDURE — 80048 BASIC METABOLIC PNL TOTAL CA: CPT

## 2020-01-10 PROCEDURE — 83690 ASSAY OF LIPASE: CPT

## 2020-01-10 PROCEDURE — 86850 RBC ANTIBODY SCREEN: CPT

## 2020-01-10 PROCEDURE — 96365 THER/PROPH/DIAG IV INF INIT: CPT

## 2020-01-10 PROCEDURE — 84439 ASSAY OF FREE THYROXINE: CPT

## 2020-01-10 PROCEDURE — 83970 ASSAY OF PARATHORMONE: CPT

## 2020-01-10 PROCEDURE — 85027 COMPLETE CBC AUTOMATED: CPT

## 2020-01-10 PROCEDURE — 81001 URINALYSIS AUTO W/SCOPE: CPT

## 2020-01-10 PROCEDURE — 93005 ELECTROCARDIOGRAM TRACING: CPT

## 2020-01-10 PROCEDURE — 86901 BLOOD TYPING SEROLOGIC RH(D): CPT

## 2020-01-10 PROCEDURE — 82550 ASSAY OF CK (CPK): CPT

## 2020-01-10 PROCEDURE — 36415 COLL VENOUS BLD VENIPUNCTURE: CPT

## 2020-01-10 PROCEDURE — 96361 HYDRATE IV INFUSION ADD-ON: CPT

## 2020-01-10 PROCEDURE — 70553 MRI BRAIN STEM W/O & W/DYE: CPT

## 2020-01-10 PROCEDURE — 83735 ASSAY OF MAGNESIUM: CPT

## 2020-01-10 PROCEDURE — 82330 ASSAY OF CALCIUM: CPT

## 2020-01-10 PROCEDURE — 84443 ASSAY THYROID STIM HORMONE: CPT

## 2020-01-10 PROCEDURE — 96376 TX/PRO/DX INJ SAME DRUG ADON: CPT

## 2020-01-10 PROCEDURE — 99285 EMERGENCY DEPT VISIT HI MDM: CPT

## 2020-01-10 PROCEDURE — 82728 ASSAY OF FERRITIN: CPT

## 2020-01-10 PROCEDURE — 83540 ASSAY OF IRON: CPT

## 2020-01-10 PROCEDURE — 85025 COMPLETE CBC W/AUTO DIFF WBC: CPT

## 2020-01-10 PROCEDURE — 86300 IMMUNOASSAY TUMOR CA 15-3: CPT

## 2020-01-10 PROCEDURE — 83550 IRON BINDING TEST: CPT

## 2020-01-10 PROCEDURE — 76705 ECHO EXAM OF ABDOMEN: CPT

## 2020-01-10 PROCEDURE — 96366 THER/PROPH/DIAG IV INF ADDON: CPT

## 2020-01-10 PROCEDURE — 86900 BLOOD TYPING SEROLOGIC ABO: CPT

## 2020-01-10 PROCEDURE — 82306 VITAMIN D 25 HYDROXY: CPT

## 2020-01-10 PROCEDURE — 84132 ASSAY OF SERUM POTASSIUM: CPT

## 2020-01-10 PROCEDURE — 80053 COMPREHEN METABOLIC PANEL: CPT

## 2020-01-10 PROCEDURE — 85730 THROMBOPLASTIN TIME PARTIAL: CPT

## 2020-01-10 PROCEDURE — 71046 X-RAY EXAM CHEST 2 VIEWS: CPT

## 2020-01-10 PROCEDURE — 96375 TX/PRO/DX INJ NEW DRUG ADDON: CPT

## 2020-01-10 PROCEDURE — 85384 FIBRINOGEN ACTIVITY: CPT

## 2020-01-10 PROCEDURE — 85610 PROTHROMBIN TIME: CPT

## 2020-01-10 PROCEDURE — 86920 COMPATIBILITY TEST SPIN: CPT

## 2020-01-10 NOTE — US
EXAMINATION TYPE: US gallbladder

 

DATE OF EXAM: 1/10/2020

 

COMPARISON: NONE

 

CLINICAL HISTORY: pancreatitis. abd pain, cholecystectomy

 

EXAM MEASUREMENTS:

 

Liver Length:  16.5 cm   

Gallbladder Wall:  Surgically absent    

CBD:  1.1 cm

Right Kidney:  9.3 x 4.4 x 4.5 cm

 

limited study due to bowel gas

 

Pancreas:  wnl

Liver:  intercostal views only appear wnl  

Gallbladder:  Surgically absent

**Evidence for sonographic Luna's sign:  yes

CBD:  dilated 

Right Kidney:  wnl 

 

 

 

IMPRESSION: Cholecystectomy. No dilated intrahepatic ducts. No focal liver defect.

## 2020-01-10 NOTE — ED
General Adult HPI





- General


Chief complaint: Nausea/Vomiting/Diarrhea


Stated complaint: bone cancer/vomiting & pain


Time Seen by Provider: 01/10/20 19:23


Source: patient, family


Mode of arrival: ambulatory


Limitations: no limitations





- History of Present Illness


Initial comments: 


Dictation was produced using dragon dictation software. please excuse any 

grammatical, word or spelling errors. 





Chief Complaint: 54-year-old male presents with nausea vomiting and total body 

pain.





History of Present Illness: 24-year-old female she has past medical history of 

breast cancer and thrush.  Patient has been without cancer treatment for the las

t 15 days.  Her oncologist is Dr. Carr.  Over the last 3-4 days she's been 

having worsening total body pain.  She is also having significant nausea and 

vomiting.  She states that her emesis is nonbilious not bloody.  Denies any 

fever.  Patient states she hurts all over.  She is told that she has a history 

of hypercalcemia.  Denies any fever, chills or night sweats.  She also complains

of diffuse abdominal pain.





The ROS documented in this emergency department record has been reviewed and 

confirmed by me.  Those systems with pertinent positive or negative responses 

have been documented in the HPI.  All other systems are other negative and/or 

noncontributory.








PHYSICAL EXAM:


General Impression: Alert and oriented x3, not in acute distress


HEENT: Normocephalic atraumatic, extra-ocular movements intact, pupils equal and

reactive to light bilaterally, dry mucous membranes


Cardiovascular: Heart regular rate and rhythm, S1&S2 audible, no murmurs, rubs 

or gallops


Chest: Lungs clear to auscultation bilaterally, no rhonchi, no wheeze, no rales


Abdomen: Diffuse abdominal tenderness to palpation


Musculoskeletal: Pulses present and equal in all extremities, no peripheral 

edema


Motor:  no focal deficits noted


Neurological: CN II-XII grossly intact, no focal motor or sensory deficits noted


Skin: Intact with no visualized rashes


Psych: Normal affect and mood





ED course: 54-year-old female presents with nausea, vomiting and total body 

pain.  She has history of breast cancer.  Ends upon arrival are within 

acceptable limits.


Laboratory evaluation obtained.  There is pancytopenia likely secondary to 

recent cancer treatment.  Metabolic panel shows elevated creatinine of 1.7H.  

This is consistent with acute kidney injury.  Calcium is 18.1 alk phos of 203 

with a lipase of 3000.  Clinical presentation consistent with hypercalcemia and 

pancreatitis.  Discussed patient case with Dr. Roberto who requests patient be 

given bisphosphonates color ultrasound shows no acute findings.  She has history

of cholecystectomy.  Chest x-ray shows extensive osteolytic lesions consistent 

with multiple myeloma which appears to be new.  No other findings..  Patient be 

admitted with consultation to oncology and ER for pancreatitis.











- Related Data


                                Home Medications











 Medication  Instructions  Recorded  Confirmed


 


Levothyroxine Sodium [Synthroid] 175 mcg PO DAILY@0500 02/18/18 01/10/20


 


Loratadine 10 mg PO DAILY PRN 02/18/18 01/10/20


 


Acetaminophen Tab [Tylenol] 1,000 mg PO Q8H 11/18/19 01/10/20


 


HYDROmorphone HCL 2 mg PO Q6H PRN 11/18/19 01/10/20


 


Methocarbamol [Robaxin] 1,500 mg PO Q6H PRN 11/18/19 01/10/20


 


Metoprolol Succinate (ER) [Toprol 25 mg PO DAILY 11/18/19 01/10/20





XL]   


 


Metoprolol Succinate (ER) [Toprol 50 mg PO HS 11/18/19 01/10/20





XL]   


 


Polyethylene Glycol 3350 [Miralax] 17 gm PO DAILY 11/18/19 01/10/20


 


Potassium Chloride [Klor-Con 20] 20 meq PO DAILY 11/18/19 01/10/20


 


Rivaroxaban [Xarelto] 20 mg PO W/SUPPER 11/18/19 01/10/20


 


Sennosides [Senna] 8.6 mg PO BID 11/18/19 01/10/20


 


oxyCODONE HCL [Roxicodone] 10 mg PO Q4H PRN 11/18/19 01/10/20


 


Docusate [Colace] 100 mg PO DAILY PRN 01/10/20 01/10/20


 


LORazepam [Ativan] 0.5 mg PO TID PRN 01/10/20 01/10/20


 


Omeprazole 20 mg PO DAILY 01/10/20 01/10/20


 


Palbociclib [Ibrance] 100 mg PO AS DIRECTED 01/10/20 01/10/20


 


oxyCODONE HCL [OxyCONTIN] 30 mg PO Q12H 01/10/20 01/10/20











                                    Allergies











Allergy/AdvReac Type Severity Reaction Status Date / Time


 


gabapentin Allergy  Rash/Hives Verified 01/10/20 21:58


 


vancomycin Allergy  Rash/Hives Verified 01/10/20 21:58


 


morphine [From MS Contin] AdvReac  Nausea & Verified 01/10/20 21:58





   Vomiting &  





   Diarrhea  














Review of Systems


ROS Statement: 


Those systems with pertinent positive or pertinent negative responses have been 

documented in the HPI.





ROS Other: All systems not noted in ROS Statement are negative.





Past Medical History


Past Medical History: Atrial Fibrillation, Cancer, Fibromyalgia, GERD/Reflux, 

Thyroid Disorder


Additional Past Medical History / Comment(s): breast cancer status post 

lumpectomy in  with recurrence of invasive ductal carcinoma stage III grade 

IIIa with lumpectomy 2014 with bone metastasis and multiple pathologic 

compression fractures.  Status post cervical fusion, right femur nail and 

titanium david all positive for cancer in 2019, kyphoplasty L3 followed by 

kyphoplasty T12, L1, L5-S1 with radiofrequency ablation 2019, history of

thyroid cancer status post lobectomy and radiation therapy, chronic atrial 

fibrillation status post ablation on Xarelto, hypothyroidism, chronic pain 

syndrome, gastroesophageal reflux disease, generalized anxiety disorder.


History of Any Multi-Drug Resistant Organisms: None Reported


Past Surgical History: Ablation, Back Surgery, Cholecystectomy, Hysterectomy, 

Orthopedic Surgery


Additional Past Surgical History / Comment(s): lumpectomy left breast x3, 

lobectomy thyroid, c5 c6 c7 fusion, bone marrow biopsy, bar/nail in right thigh 

and plate in right arm, ablation on heart , gallstone removed from bile 

duct, left breast re-incision for clear margins in ,port plcement,  had 

port removed, kyphoplasty on back x3 and radiofrequncy ablation to back,


Past Anesthesia/Blood Transfusion Reactions: Previous Problems w/ Anesthesia


Additional Past Anesthesia/Blood Transfusion Reaction / Comment(s): Hard time 

waking up and hypothermia


Past Psychological History: No Psychological Hx Reported


Smoking Status: Never smoker


Past Alcohol Use History: None Reported


Past Drug Use History: None Reported





- Past Family History


  ** Son(s)


History Unknown: Yes


Additional Family Medical History / Comment(s): Small nodule on thyroid, no 

other history





  ** Father


Additional Family Medical History / Comment(s): Patient does not know any 

history on her father.





  ** Mother


Additional Family Medical History / Comment(s): Mother  at age 83 from a 

perforated bowel with fistula to the bladder, COPD history.





  ** Brother(s)


Additional Family Medical History / Comment(s): Patient has 1 brother that had 

part of his colon removed.  Patient does not have any sisters.





General Exam


Limitations: no limitations





Course


                                   Vital Signs











  01/10/20 01/10/20 01/10/20





  19:15 21:19 22:53


 


Temperature 98.1 F  


 


Pulse Rate 95 100 100


 


Respiratory 20 18 18





Rate   


 


Blood Pressure 100/74 126/84 120/80


 


O2 Sat by Pulse 95 92 L 100





Oximetry   














Medical Decision Making





- Lab Data


Result diagrams: 


                                 01/10/20 20:03





                                 01/10/20 20:03


                                   Lab Results











  01/10/20 01/10/20 Range/Units





  20:03 20:03 


 


WBC  0.8 L*   (3.8-10.6)  k/uL


 


RBC  3.41 L   (3.80-5.40)  m/uL


 


Hgb  10.3 L   (11.4-16.0)  gm/dL


 


Hct  32.3 L   (34.0-46.0)  %


 


MCV  94.9   (80.0-100.0)  fL


 


MCH  30.3   (25.0-35.0)  pg


 


MCHC  31.9   (31.0-37.0)  g/dL


 


RDW  19.8 H   (11.5-15.5)  %


 


Plt Count  68 L D   (150-450)  k/uL


 


Neutrophils % (Manual)  53   %


 


Band Neutrophils %  7   %


 


Lymphocytes % (Manual)  29   %


 


Monocytes % (Manual)  7   %


 


Eosinophils % (Manual)  1   %


 


Basophils % (Manual)  1   %


 


Metamyelocytes %  2   %


 


Myelocytes %  3   %


 


Neutrophils # (Manual)  0.40 L*   (1.3-7.7)  k/uL


 


Lymphocytes # (Manual)  0.23 L   (1.0-4.8)  k/uL


 


Monocytes # (Manual)  0.06   (0-1.0)  k/uL


 


Eosinophils # (Manual)  0.01   (0-0.7)  k/uL


 


Basophils # (Manual)  0.01   (0-0.2)  k/uL


 


Metamyelocytes # (Man)  0.02 H   (0)  k/uL


 


Myelocytes # (Manual)  0.02 H   (0)  k/uL


 


Nucleated RBCs  83 H   (0-0)  /100 WBC


 


Manual Slide Review  Performed   


 


Polychromasia  Present   


 


Poikilocytosis (manual  Present   


 


Anisocytosis  Slight   


 


Macrocytosis  Slight   


 


Stomatocytes  Present   


 


Sodium   141  (137-145)  mmol/L


 


Potassium   3.5  (3.5-5.1)  mmol/L


 


Chloride   99  ()  mmol/L


 


Carbon Dioxide   29  (22-30)  mmol/L


 


Anion Gap   13  mmol/L


 


BUN   41 H  (7-17)  mg/dL


 


Creatinine   1.78 H  (0.52-1.04)  mg/dL


 


Est GFR (CKD-EPI)AfAm   36  (>60 ml/min/1.73 sqM)  


 


Est GFR (CKD-EPI)NonAf   31  (>60 ml/min/1.73 sqM)  


 


Glucose   97  (74-99)  mg/dL


 


Calcium   18.1 H*  (8.4-10.2)  mg/dL


 


Total Bilirubin   1.2  (0.2-1.3)  mg/dL


 


AST   177 H  (14-36)  U/L


 


ALT   75 H  (4-34)  U/L


 


Alkaline Phosphatase   203 H  ()  U/L


 


Creatine Kinase   38  ()  U/L


 


Total Protein   7.6  (6.3-8.2)  g/dL


 


Albumin   4.2  (3.5-5.0)  g/dL


 


Lipase   3253 H  ()  U/L














Disposition


Clinical Impression: 


 Hypercalcemia, Pancreatitis





Disposition: ADMITTED AS IP TO THIS HOSP


Condition: Fair


Referrals: 


Jhony Mercado MD [Primary Care Provider] - 1-2 days


Decision Time: 23:10

## 2020-01-10 NOTE — XR
EXAMINATION TYPE: XR chest 2V

 

DATE OF EXAM: 1/10/2020

 

COMPARISON: 12/4/2018

 

HISTORY: Short of breath. Chest pain

 

TECHNIQUE: 2 views

 

FINDINGS: There is no heart failure. There is no pleural effusion. There is compression deformity and
 bone destruction involving thoracic and lumbar vertebra. This is seen at T9 and T8 and T5 and L1 and
 L2 vertebra. There is some apparent destructive changes in multiple bilateral ribs. There is pleural
 based mass left lower lobe consistent with myeloma involvement of left ribs. There is destructive ch
anges in the left and right clavicle right scapula. There is no pleural effusion. There is normal hea
rt size.

 

IMPRESSION: Extensive osteolytic changes consistent with multiple myeloma is a significant change com
pared to old chest x-ray. No heart failure seen. No evidence of bronchopneumonia.

## 2020-01-11 LAB
ALBUMIN SERPL-MCNC: 3.2 G/DL (ref 3.5–5)
ALP SERPL-CCNC: 136 U/L (ref 38–126)
ALT SERPL-CCNC: 68 U/L (ref 4–34)
ANION GAP SERPL CALC-SCNC: 6 MMOL/L
AST SERPL-CCNC: 143 U/L (ref 14–36)
BASOPHILS # BLD MANUAL: 0.06 K/UL (ref 0–0.2)
BUN SERPL-SCNC: 48 MG/DL (ref 7–17)
CALCIUM SPEC-MCNC: 15.8 MG/DL (ref 8.4–10.2)
CELLS COUNTED: 200
CHLORIDE SERPL-SCNC: 106 MMOL/L (ref 98–107)
CO2 SERPL-SCNC: 27 MMOL/L (ref 22–30)
EOSINOPHIL # BLD MANUAL: 0.03 K/UL (ref 0–0.7)
ERYTHROCYTE [DISTWIDTH] IN BLOOD BY AUTOMATED COUNT: 2.6 M/UL (ref 3.8–5.4)
ERYTHROCYTE [DISTWIDTH] IN BLOOD: 20 % (ref 11.5–15.5)
GLUCOSE SERPL-MCNC: 80 MG/DL (ref 74–99)
HCT VFR BLD AUTO: 25.5 % (ref 34–46)
HGB BLD-MCNC: 8.2 GM/DL (ref 11.4–16)
LYMPHOCYTES # BLD MANUAL: 0.46 K/UL (ref 1–4.8)
MCH RBC QN AUTO: 31.5 PG (ref 25–35)
MCHC RBC AUTO-ENTMCNC: 32.1 G/DL (ref 31–37)
MCV RBC AUTO: 98.3 FL (ref 80–100)
METAMYELOCYTES # BLD: 0.15 K/UL
MONOCYTES # BLD MANUAL: 0.29 K/UL (ref 0–1)
MYELOCYTES # BLD MANUAL: 0.03 K/UL
NEUTROPHILS NFR BLD MANUAL: 59 %
NEUTS SEG # BLD MANUAL: 1.9 K/UL (ref 1.3–7.7)
PLATELET # BLD AUTO: 52 K/UL (ref 150–450)
POTASSIUM SERPL-SCNC: 4.2 MMOL/L (ref 3.5–5.1)
PROT SERPL-MCNC: 5.9 G/DL (ref 6.3–8.2)
SODIUM SERPL-SCNC: 139 MMOL/L (ref 137–145)
WBC # BLD AUTO: 2.9 K/UL (ref 3.8–10.6)

## 2020-01-11 RX ADMIN — HYDROMORPHONE HYDROCHLORIDE PRN MG: 1 INJECTION, SOLUTION INTRAMUSCULAR; INTRAVENOUS; SUBCUTANEOUS at 17:11

## 2020-01-11 RX ADMIN — HYDROMORPHONE HYDROCHLORIDE PRN MG: 1 INJECTION, SOLUTION INTRAMUSCULAR; INTRAVENOUS; SUBCUTANEOUS at 04:41

## 2020-01-11 RX ADMIN — OXYCODONE HYDROCHLORIDE SCH MG: 15 TABLET, FILM COATED, EXTENDED RELEASE ORAL at 21:55

## 2020-01-11 RX ADMIN — METOPROLOL SUCCINATE SCH MG: 50 TABLET, EXTENDED RELEASE ORAL at 22:00

## 2020-01-11 RX ADMIN — RIVAROXABAN SCH MG: 15 TABLET, FILM COATED ORAL at 17:11

## 2020-01-11 RX ADMIN — CALCITONIN SALMON SCH UNIT: 200 INJECTION, SOLUTION INTRAMUSCULAR; SUBCUTANEOUS at 22:34

## 2020-01-11 RX ADMIN — HYDROMORPHONE HYDROCHLORIDE PRN MG: 1 INJECTION, SOLUTION INTRAMUSCULAR; INTRAVENOUS; SUBCUTANEOUS at 23:31

## 2020-01-11 RX ADMIN — ONDANSETRON PRN MG: 2 INJECTION INTRAMUSCULAR; INTRAVENOUS at 11:25

## 2020-01-11 RX ADMIN — HYDROMORPHONE HYDROCHLORIDE PRN MG: 1 INJECTION, SOLUTION INTRAMUSCULAR; INTRAVENOUS; SUBCUTANEOUS at 08:19

## 2020-01-11 RX ADMIN — OXYCODONE HYDROCHLORIDE SCH MG: 15 TABLET, FILM COATED, EXTENDED RELEASE ORAL at 10:35

## 2020-01-11 RX ADMIN — PANTOPRAZOLE SODIUM SCH MG: 40 TABLET, DELAYED RELEASE ORAL at 10:36

## 2020-01-11 RX ADMIN — CEFAZOLIN SCH: 330 INJECTION, POWDER, FOR SOLUTION INTRAMUSCULAR; INTRAVENOUS at 10:24

## 2020-01-11 RX ADMIN — HYDROMORPHONE HYDROCHLORIDE PRN MG: 1 INJECTION, SOLUTION INTRAMUSCULAR; INTRAVENOUS; SUBCUTANEOUS at 11:26

## 2020-01-11 RX ADMIN — HYDROMORPHONE HYDROCHLORIDE PRN MG: 1 INJECTION, SOLUTION INTRAMUSCULAR; INTRAVENOUS; SUBCUTANEOUS at 14:24

## 2020-01-11 RX ADMIN — METOPROLOL SUCCINATE SCH MG: 25 TABLET, EXTENDED RELEASE ORAL at 10:35

## 2020-01-11 RX ADMIN — CEFAZOLIN SCH MLS/HR: 330 INJECTION, POWDER, FOR SOLUTION INTRAMUSCULAR; INTRAVENOUS at 16:20

## 2020-01-11 RX ADMIN — CEFAZOLIN SCH MLS/HR: 330 INJECTION, POWDER, FOR SOLUTION INTRAMUSCULAR; INTRAVENOUS at 00:10

## 2020-01-11 RX ADMIN — DOCUSATE SODIUM PRN MG: 100 CAPSULE, LIQUID FILLED ORAL at 10:35

## 2020-01-11 NOTE — CONS
CONSULTATION



DATE OF SERVICE:

2020



REASON FOR CONSULTATION:

Acute pancreatitis.



HISTORY OF PRESENT ILLNESS:

The patient is a 54-year-old pleasant white female who was diagnosed with breast cancer

with bone metastasis in  of this year.  The patient is undergoing chemotherapy and

follows with Dr. Carr.  Her last chemotherapy was two weeks ago.  She started

complaining of severe abdominal pain mostly in the epigastric area associated with

nausea, vomiting, and severe back pain from bone metastasis.  She came into the

emergency room and was noted to have elevated lipase consistent with acute

pancreatitis.  She was also noted to have hypercalcemia with a calcium level of 18.

The patient never had prior history of pancreatitis.  She has a remote history of

cholecystectomy.  This morning she still continues to have more back pain and some

abdominal pain.  She feels very thirsty and hungry.



PAST MEDICAL HISTORY:

Her past medical history is significant for breast cancer with bone metastasis, history

of atrial fibrillation, fibromyalgia, gastroesophageal reflux disease, hypothyroidism.



PAST SURGICAL HISTORY:

Status post lumpectomy in  with recurrence in 2014 and bone metastasis

diagnosed in 2019, history of back surgery, cardiac ablation, cholecystectomy,

hysterectomy.



MEDICATIONS:

Medications at home include Synthroid, loratadine, Tylenol, hydromorphone, Robaxin,

Toprol, MiraLAX, K-Nicolette, Xarelto, Senna, Roxicodone, Colace, Ativan, omeprazole,

Ibrance, and OxyContin.



ALLERGIES:

VANCOMYCIN, MORPHINE, GABAPENTIN.



SOCIAL HISTORY:

No smoking.  No alcohol use.



FAMILY HISTORY:

Son has thyroid nodules.  Father unremarkable.  Mother  at 83 from bowel

perforation.



REVIEW OF SYSTEMS:

CARDIOPULMONARY:  She denies any chest pain or shortness of breath.

GENITOURINARY:  No dysuria or hematuria.

MUSCULOSKELETAL:  Severe back pain and pain all over the body.

NEUROLOGY:  Unremarkable.

PSYCHIATRIC:  Unremarkable.

ENT/VISION:  Unremarkable.

CONSTITUTIONAL:  No recent weight loss.  No fever, chills, night sweats.

HEMATOLOGY:  Unremarkable other than pancytopenia.



PHYSICAL EXAMINATION:

She appears somewhat uncomfortable from backache.

VITAL SIGNS:  Blood pressure 118/73, pulse rate 94, temperature 98.2.

HEENT:  Unremarkable.  Conjunctivae pink.  Sclerae anicteric.  Oral cavity no lesions.

NECK:  No JVD or no lymph node enlargement.

CHEST:  Clear to auscultation.

HEART:  Regular rate and rhythm.

ABDOMEN:  Slightly tender in the epigastric area.  There was mild diffuse tenderness

all over the abdomen.

EXTREMITIES:  No pedal edema.

SKIN:  No rashes.

NEUROLOGIC:  She is alert and oriented x3.  No focal deficits.



LABORATORY DATA:

Labs from today: WBC 0.8, hemoglobin 10.3, platelets 68,000, BUN 41, creatinine 1.78.

Calcium level is 18.  , ALT 75, alkaline phosphatase 203.  Lipase is 3253.



IMAGING:

She did have ultrasound of the right upper quadrant done in the emergency room last

night that showed evidence of cholecystectomy with no evidence of biliary ductal

dilatation.  Liver appeared normal.



IMPRESSION:

1. This is a lady who presents with acute onset of severe epigastric pain associated

    with nausea, vomiting for the last 3-4 days' duration and noted to have elevated

    lipase consistent with acute pancreatitis.  Most likely the pancreatitis is related

    to hypercalcemia.  She never had pancreatitis in the past.  Her calcium level was

    18.1.  She also is noted to have mild elevation of serum transaminases and has a

    remote history of gallbladder surgery several years ago.  Ultrasound did not show

    any dilated biliary ducts.  Possibility of a latent common bile duct stone cannot

    be entirely excluded, though unlikely.

2. Breast cancer with metastasis to the spine, undergoing chemotherapy and sees Dr. Carr.  Last chemo was two weeks ago.

3. Pancytopenia secondary to recent chemotherapy.

4. Atrial fibrillation, on Xarelto.



RECOMMENDATIONS:

1. Will start her on a clear liquid diet.

2. Repeat labs today.

3. Pain medications.

4. Aggressive control of hypercalcemia.

5. Repeat labs in the morning.  Will follow with you closely.



Thank you for this consultation.





MMODL / IJN: [id number] / Job#: [id number]

## 2020-01-11 NOTE — P.NPCON
History of Present Illness





- Reason for Consult


acute renal failure





- History of Present Illness





Reason for consultation: Acute kidney injury and hypercalcemia





History of present illness:


Patient is a 54-year-old female seen in renal consultation for acute kidney 

injury and hypercalcemia.  Patient denies any prior history of kidney disease.  

Her baseline creatinine is near 1.  It is up to 1.87 today.  Patient presented 

to the hospital due to 1 week duration of nausea and vomiting.  Oral intake has 

been poor.  She also admits to loose bowel movements.  Denies chest pain or 

shortness of breath.  Patient has history of breast cancer with bone metastasis.

 Her calcium level was 18.1 on admission and she's currently receiving IV 

fluids.  She also received a dose of pamidronate.  Calcium level this morning is

15.8.  She has been voiding.  No hematuria or dysuria.  Denies use of 

nonsteroidals.  Lasix is also been ordered.  Hemodynamically stable.  Patient 

states she's been off chemotherapy for about 15 days now.





Vital signs are stable.


General: The patient appeared well nourished and normally developed. 


HEENT: Head exam is unremarkable. Neck is without jugular venous distension.


LUNGS: Lungs are clear to auscultation and percussion. Breath sounds decreased.


HEART: Rate and Rhythm are regular. First and second heart sounds normal. No 

murmurs, rubs or gallops. 


ABDOMEN: Abdominal exam reveals normal bowel sounds. Non-tender and non-

distended. No evidence of peritonitis.


EXTREMITITES: No clubbing, cyanosis, or edema.





Past Medical History


Past Medical History: Atrial Fibrillation, Cancer, Fibromyalgia, GERD/Reflux, 

Thyroid Disorder


Additional Past Medical History / Comment(s): breast cancer status post 

lumpectomy in  with recurrence of invasive ductal carcinoma stage III grade 

IIIa with lumpectomy 2014 with bone metastasis and multiple pathologic 

compression fractures.  Status post cervical fusion, right femur nail and 

titanium david all positive for cancer in 2019, kyphoplasty L3 followed by 

kyphoplasty T12, L1, L5-S1 with radiofrequency ablation 2019, history of

thyroid cancer status post lobectomy and radiation therapy, chronic atrial 

fibrillation status post ablation on Xarelto, hypothyroidism, chronic pain 

syndrome, gastroesophageal reflux disease, generalized anxiety disorder.


History of Any Multi-Drug Resistant Organisms: None Reported


Past Surgical History: Ablation, Back Surgery, Cholecystectomy, Hysterectomy, 

Orthopedic Surgery


Additional Past Surgical History / Comment(s): lumpectomy left breast x3, 

lobectomy thyroid, c5 c6 c7 fusion, bone marrow biopsy, bar/nail in right thigh 

and plate in right arm, ablation on heart , gallstone removed from bile 

duct, left breast re-incision for clear margins in ,port plcement, 2015 had 

port removed, kyphoplasty on back x3 and radiofrequncy ablation to back,


Past Anesthesia/Blood Transfusion Reactions: Previous Problems w/ Anesthesia


Additional Past Anesthesia/Blood Transfusion Reaction / Comment(s): Hard time 

waking up and hypothermia


Past Psychological History: Depression


Additional Psychological History / Comment(s): Patient lives in ranch style home

with . Patient walks with walker.


Smoking Status: Never smoker


Past Alcohol Use History: None Reported


Additional Past Alcohol Use History / Comment(s): Patient is a lifelong 

nonsmoker, or illicit drug use. Used to smoke marijuana but hasnt for the last 

year.


Past Drug Use History: None Reported





- Past Family History


  ** Son(s)


History Unknown: Yes


Additional Family Medical History / Comment(s): Small nodule on thyroid, no 

other history





  ** Father


Family Medical History: No Reported History


Additional Family Medical History / Comment(s): Patient does not know any 

history on her father.





  ** Mother


Family Medical History: COPD


Additional Family Medical History / Comment(s): Mother  at age 83 from a 

perforated bowel with fistula to the bladder, COPD history.





  ** Brother(s)


Additional Family Medical History / Comment(s): Patient has 1 brother that had 

part of his colon removed.  Patient does not have any sisters.





Medications and Allergies


                                Home Medications











 Medication  Instructions  Recorded  Confirmed  Type


 


Levothyroxine Sodium [Synthroid] 175 mcg PO DAILY@0500 02/18/18 01/10/20 History


 


Loratadine 10 mg PO DAILY PRN 02/18/18 01/10/20 History


 


Acetaminophen Tab [Tylenol] 1,000 mg PO Q8H 11/18/19 01/10/20 History


 


HYDROmorphone HCL 2 mg PO Q6H PRN 11/18/19 01/10/20 History


 


Methocarbamol [Robaxin] 1,500 mg PO Q6H PRN 11/18/19 01/10/20 History


 


Metoprolol Succinate (ER) [Toprol 25 mg PO DAILY 11/18/19 01/10/20 History





XL]    


 


Metoprolol Succinate (ER) [Toprol 50 mg PO HS 11/18/19 01/10/20 History





XL]    


 


Polyethylene Glycol 3350 [Miralax] 17 gm PO DAILY 11/18/19 01/10/20 History


 


Potassium Chloride [Klor-Con 20] 20 meq PO DAILY 11/18/19 01/10/20 History


 


Rivaroxaban [Xarelto] 20 mg PO W/SUPPER 11/18/19 01/10/20 History


 


Sennosides [Senna] 8.6 mg PO BID 11/18/19 01/10/20 History


 


oxyCODONE HCL [Roxicodone] 10 mg PO Q4H PRN 11/18/19 01/10/20 History


 


Docusate [Colace] 100 mg PO DAILY PRN 01/10/20 01/10/20 History


 


LORazepam [Ativan] 0.5 mg PO TID PRN 01/10/20 01/10/20 History


 


Omeprazole 20 mg PO DAILY 01/10/20 01/10/20 History


 


Palbociclib [Ibrance] 100 mg PO AS DIRECTED 01/10/20 01/10/20 History


 


oxyCODONE HCL [OxyCONTIN] 30 mg PO Q12H 01/10/20 01/10/20 History








                                    Allergies











Allergy/AdvReac Type Severity Reaction Status Date / Time


 


gabapentin Allergy  Rash/Hives Verified 01/10/20 21:58


 


vancomycin Allergy  Rash/Hives Verified 01/10/20 21:58


 


morphine [From MS Contin] AdvReac  Nausea & Verified 01/10/20 21:58





   Vomiting &  





   Diarrhea  














Physical Exam


Vitals: 


                                   Vital Signs











  Temp Pulse Pulse Resp BP BP Pulse Ox


 


 20 04:31  98.2 F   94  15   118/73  98


 


 20 00:51  98.5 F   103 H  20   123/88  93 L


 


 01/10/20 22:53   100   18  120/80   100


 


 01/10/20 21:19   100   18  126/84   92 L


 


 01/10/20 19:15  98.1 F  95   20  100/74   95








                                Intake and Output











 01/10/20 01/11/20 01/11/20





 22:59 06:59 14:59


 


Intake Total  800 


 


Output Total   250


 


Balance  800 -250


 


Intake:   


 


  Intake, IV Titration  800 





  Amount   


 


    Sodium Chloride 0.9% 1,  550 





    000 ml @ 110 mls/hr IV .   





    Q9H6M [facility] Rx#:[id number]   


 


    Sodium Chloride 0.9% 250  250 





    ml @ 83 mls/hr IV .Q3H1M   





    ONE with Pamidronate 30   





    mg Rx#:[id number]   


 


Output:   


 


  Urine   250


 


Other:   


 


  Voiding Method  Urinal Urinal


 


  Weight 73.482 kg 72.5 kg 














Results





- Lab Results


                             Most recent lab results











Calcium  15.8 mg/dL (8.4-10.2)  H*  20  09:02    














                                 20 09:02





                                 20 09:02





Assessment and Plan


Plan: 





Assessment:


1.  Acute kidney injury secondary to ATN secondary to hypercalcemia and 

intravascular volume depletion from vomiting.  Creatinine 1.87 today.  Baseline 

near 1.


2.  Hypercalcemia secondary to bone metastasis and volume contraction.


3.  Breast cancer with bone metastasis.


4.  Pancytopenia secondary to chemotherapy.





Plan:


Maintain normal saline at 100 mL an hour.


Discontinue Lasix.


Discontinue potassium supplementation.


Status post pamidronate on January 10.


Start calcitonin 300 units IM twice daily.


Check PTH and vitamin D.


Repeat electrolytes in the morning.





Thank you for the consultation.  I will continue to follow the patient with you 

during her hospital stay.

## 2020-01-11 NOTE — P.HPIM
History of Present Illness


H&P Date: 20








This is a 54-year-old  female patient of Valdemar Alvarez NP with past 

medical history of breast cancer status post lumpectomy in  with recurrence 

of invasive ductal carcinoma stage III grade IIIa with lumpectomy 2014 

with bone metastasis and multiple pathologic compression fractures.  Status post

cervical fusion, right femur nail and titanium david all positive for cancer in 

2019, kyphoplasty L3 followed by kyphoplasty T12, L1, L5-S1 with 

radiofrequency ablation 2019, history of thyroid cancer status post 

lobectomy and radiation therapy, chronic atrial fibrillation status post 

ablation on Xarelto, hypothyroidism, chronic pain syndrome, gastroesophageal 

reflux disease, generalized anxiety disorder.  Patient's last chemotherapy was 

performed on 10/16/2019.  Last radiation to the cervical, right femur and lumbar

areas completed on 2019.  PET scan on 2019 revealed osseous 

metastatic disease. Patient has [facility] home care in place.  Patient states that

she was at  from  through the  at which 

time she underwent lumbar spine procedures.  She states she also has a left 

scapular fracture and rib fractures.  Old fractures are pathologic related to 

cancer with metastatic disease.  She had previously received all her care at 

 and  now switch to Dr. Carr.   She was last admitted from her

facility  to  secondary to hypercalcemia secondary to 

metastatic disease to the bone, she was seen by Dr. Rmairez that time, she 

received IM calcitonin, and Zometa 4 mg IV, calcium on the last admission was 

14.9, on discharge She'll level was 7.2.











Patient came into [facility] emergency center for evaluation.  

secondary to abdominal pain of 1 week, nausea vomiting of one week, she is was 

found to have a calcium level of 18, creatinine is also worsening, currently at 

1.78 on admission, discharge creatinine was 0.55, .  Lipase was 

elevated at 3253, liver function tests elevated including alkaline Andrey, 203.  

She has known history of cholecystectomy with sphincterotomy in the past.  She 

does have chronic pain, we are going to continue on the OxyContin 20 mg every 12

hours, IV Dilaudid, consult were made with Dr. Rodriguez gastrology for pancreatitis,

consult with Dr. Desai, and Dr. Ramirez for severe critical hypercalcemia.  We'll 

going to add additional infusion of IV pamidronate, for a total of 90 mg 1 dose

today.  IV hydration, IV Lasix 40 mg twice a day, 





 





Review of Systems


Constitutional: Reports anorexia, Reports chronic pain, Reports lethargy, 

Reports malaise, Reports poor appetite, Reports weight gain


Ears, nose, mouth and throat: Reports as per HPI, Denies ant. neck pain, Denies 

bleeding gums, Denies dental pain, Denies dysphagia, Denies epistaxis, Denies 

headache, Denies hoarseness, Denies mouth pain, Denies nasal congestion, Denies 

nasal discharge, Denies neck fullness/pressure, Denies neck lump, Denies nose 

pain, Denies odynophagia, Denies post-nasal drip, Denies sinus pain, Denies 

sinus pressure, Denies swelling in mouth, Denies swelling in throat, Denies sore

throat, Denies vertigo, Denies voice changes


Cardiovascular: Reports as per HPI, Denies chest pain, Denies claudication, 

Denies decreased exercise tolerance, Denies dyspnea on exertion, Denies edema, 

Denies high blood pressure, Denies irregular heart beat, Denies leg edema, 

Denies lightheadedness, Denies orthopnea, Denies palpitations, Denies paroxysmal

nocturnal dyspnea, Denies phlebitis, Denies rapid heart beat, Denies shortness 

of breath, Denies syncope


Respiratory: Reports as per HPI, Denies congestion, Denies cough, Denies cough 

with sputum, Denies dyspnea, Denies excessive sputum, Denies hemoptysis, Denies 

home oxygen, Denies pain, Denies pain on inspiration, Denies pleurisy, Denies 

respiratory infections, Denies sleep apnea, Denies snoring, Denies wheezing


Gastrointestinal: Reports as per HPI, Reports abdominal pain, Reports dyspepsia,

Reports early satiety, Reports loss of appetite, Reports nausea, Reports 

vomiting


Genitourinary: Reports as per HPI


Menstruation: Reports as per HPI, Denies amenorrhea, Denies amenorrhea on BC, 

Denies currently menstrual, Denies cycle < 21 days, Denies cycle > 35 days, 

Denies cycle variable, Denies menses 1-7 days, Denies menses 8 or > days, Denies

menses variable, Denies period heavy, Denies period light, Denies period normal,

Denies period spotting, Denies post hysterectomy, Denies postmenopausal, Denies 

premenarcheal


Musculoskeletal: Reports as per HPI, Reports gait dysfunction, Reports 

limitation of motion, Denies arm numbness/tingling, Denies atrophy, Denies 

fractures, Denies frequent falls, Denies hot joints, Denies leg 

numbness/tingling, Denies loss of height, Denies low back pain, Denies morning 

stiffness, Denies muscle cramps, Denies muscle weakness, Denies myalgias, Denies

neck pain, Denies neck stiffness, Denies prior amputations, Denies redness of 

joints, Denies shooting arm pain, Denies shooting leg pain


Integumentary: Reports as per HPI


Neurological: Reports as per HPI, Reports memory loss


Psychiatric: Reports as per HPI, Denies anhedonia, Denies anxiety, Denies 

anxiety attacks, Denies change in appetite, Denies change in libido, Denies 

change in sleep habits, Denies confusion, Denies depression, Denies difficulty 

concentrating, Denies disorientation, Denies hallucinations, Denies 

hopelessness, Denies hypersomnia, Denies insomnia, Denies irritability, Denies 

memory loss, Denies mood swings, Denies paranoia, Denies sadness/tearfulness, 

Denies sleep disturbances, Denies suicidal ideation


Endocrine: Reports as per HPI


Hematologic/Lymphatic: Reports as per HPI


Allergic/Immunologic: Reports as per HPI, Denies allergic rhinitis, Denies 

anaphylaxis, Denies angioedema, Denies gluten intolerance, Denies persistent 

infections, Denies seasonal allergies, Denies urticaria, Denies wheezing





Past Medical History


Past Medical History: Atrial Fibrillation, Cancer, Fibromyalgia, GERD/Reflux, 

Thyroid Disorder


Additional Past Medical History / Comment(s): breast cancer status post 

lumpectomy in  with recurrence of invasive ductal carcinoma stage III grade 

IIIa with lumpectomy 2014 with bone metastasis and multiple pathologic 

compression fractures.  Status post cervical fusion, right femur nail and 

titanium david all positive for cancer in 2019, kyphoplasty L3 followed by 

kyphoplasty T12, L1, L5-S1 with radiofrequency ablation 2019, history of

thyroid cancer status post lobectomy and radiation therapy, chronic atrial 

fibrillation status post ablation on Xarelto, hypothyroidism, chronic pain 

syndrome, gastroesophageal reflux disease, generalized anxiety disorder.


History of Any Multi-Drug Resistant Organisms: None Reported


Past Surgical History: Ablation, Back Surgery, Cholecystectomy, Hysterectomy, 

Orthopedic Surgery


Additional Past Surgical History / Comment(s): lumpectomy left breast x3, 

lobectomy thyroid, c5 c6 c7 fusion, bone marrow biopsy, bar/nail in right thigh 

and plate in right arm, ablation on heart , gallstone removed from bile 

duct, left breast re-incision for clear margins in ,port plcement, 2015 had 

port removed, kyphoplasty on back x3 and radiofrequncy ablation to back,


Past Anesthesia/Blood Transfusion Reactions: Previous Problems w/ Anesthesia


Additional Past Anesthesia/Blood Transfusion Reaction / Comment(s): Hard time 

waking up and hypothermia


Past Psychological History: Depression


Additional Psychological History / Comment(s): Patient lives in ranch style home

with . Patient walks with walker.


Smoking Status: Never smoker


Past Alcohol Use History: None Reported


Additional Past Alcohol Use History / Comment(s): Patient is a lifelong 

nonsmoker, or illicit drug use. Used to smoke marijuana but hasnt for the last 

year.


Past Drug Use History: None Reported





- Past Family History


  ** Son(s)


History Unknown: Yes


Additional Family Medical History / Comment(s): Small nodule on thyroid, no 

other history





  ** Father


Family Medical History: No Reported History


Additional Family Medical History / Comment(s): Patient does not know any 

history on her father.





  ** Mother


Family Medical History: COPD


Additional Family Medical History / Comment(s): Mother  at age 83 from a 

perforated bowel with fistula to the bladder, COPD history.





  ** Brother(s)


Additional Family Medical History / Comment(s): Patient has 1 brother that had 

part of his colon removed.  Patient does not have any sisters.





Medications and Allergies


                                Home Medications











 Medication  Instructions  Recorded  Confirmed  Type


 


Levothyroxine Sodium [Synthroid] 175 mcg PO DAILY@0500 02/18/18 01/10/20 History


 


Loratadine 10 mg PO DAILY PRN 02/18/18 01/10/20 History


 


Acetaminophen Tab [Tylenol] 1,000 mg PO Q8H 11/18/19 01/10/20 History


 


HYDROmorphone HCL 2 mg PO Q6H PRN 11/18/19 01/10/20 History


 


Methocarbamol [Robaxin] 1,500 mg PO Q6H PRN 11/18/19 01/10/20 History


 


Metoprolol Succinate (ER) [Toprol 25 mg PO DAILY 11/18/19 01/10/20 History





XL]    


 


Metoprolol Succinate (ER) [Toprol 50 mg PO HS 11/18/19 01/10/20 History





XL]    


 


Polyethylene Glycol 3350 [Miralax] 17 gm PO DAILY 11/18/19 01/10/20 History


 


Potassium Chloride [Klor-Con 20] 20 meq PO DAILY 11/18/19 01/10/20 History


 


Rivaroxaban [Xarelto] 20 mg PO W/SUPPER 11/18/19 01/10/20 History


 


Sennosides [Senna] 8.6 mg PO BID 11/18/19 01/10/20 History


 


oxyCODONE HCL [Roxicodone] 10 mg PO Q4H PRN 11/18/19 01/10/20 History


 


Docusate [Colace] 100 mg PO DAILY PRN 01/10/20 01/10/20 History


 


LORazepam [Ativan] 0.5 mg PO TID PRN 01/10/20 01/10/20 History


 


Omeprazole 20 mg PO DAILY 01/10/20 01/10/20 History


 


Palbociclib [Ibrance] 100 mg PO AS DIRECTED 01/10/20 01/10/20 History


 


oxyCODONE HCL [OxyCONTIN] 30 mg PO Q12H 01/10/20 01/10/20 History








                                    Allergies











Allergy/AdvReac Type Severity Reaction Status Date / Time


 


gabapentin Allergy  Rash/Hives Verified 01/10/20 21:58


 


vancomycin Allergy  Rash/Hives Verified 01/10/20 21:58


 


morphine [From MS Contin] AdvReac  Nausea & Verified 01/10/20 21:58





   Vomiting &  





   Diarrhea  














Physical Exam


Vitals: 


                                   Vital Signs











  Temp Pulse Pulse Resp BP BP Pulse Ox


 


 20 11:23  97.5 F L   107 H  16   137/74  98


 


 20 04:31  98.2 F   94  15   118/73  98


 


 20 00:51  98.5 F   103 H  20   123/88  93 L


 


 01/10/20 22:53   100   18  120/80   100


 


 01/10/20 21:19   100   18  126/84   92 L


 


 01/10/20 19:15  98.1 F  95   20  100/74   95








                                Intake and Output











 01/10/20 01/11/20 01/11/20





 22:59 06:59 14:59


 


Intake Total  800 


 


Output Total   250


 


Balance  800 -250


 


Intake:   


 


  Intake, IV Titration  800 





  Amount   


 


    Sodium Chloride 0.9% 1,  550 





    000 ml @ 110 mls/hr IV .   





    Q9H6M [facility] Rx#:[id number]   


 


    Sodium Chloride 0.9% 250  250 





    ml @ 83 mls/hr IV .Q3H1M   





    ONE with Pamidronate 30   





    mg Rx#:[id number]   


 


Output:   


 


  Urine   250


 


Other:   


 


  Voiding Method  Urinal Urinal


 


  Weight 73.482 kg 72.5 kg 72.5 kg














- Constitutional


General appearance: cooperative, no acute distress





- EENT


Eyes: anicteric sclerae, dentition normal, normal appearance


ENT: hearing grossly normal, normal oropharynx





- Neck


Neck: normal ROM





- Respiratory


Respiratory: bilateral: CTA, negative: diminished, dullness, rales, rhonchi





- Cardiovascular


Rhythm: regular


Heart sounds: normal: S1, S2


Abnormal Heart Sounds: no systolic murmur, no diastolic murmur, no rub, no S3 

Gallop, no S4 Gallop, no click, no other





- Gastrointestinal


General gastrointestinal: normal bowel sounds, soft





- Integumentary


Integumentary: normal





- Neurologic


Neurologic: CNII-XII intact





- Musculoskeletal


Musculoskeletal: gait normal, strength equal bilaterally





- Psychiatric


Psychiatric: A&O x's 3, appropriate affect, intact judgment & insight





Results


CBC & Chem 7: 


                                 20 09:02





                                 20 09:02


Labs: 


                  Abnormal Lab Results - Last 24 Hours (Table)











  01/10/20 01/10/20 01/11/20 Range/Units





  20:03 20:03 09:02 


 


WBC  0.8 L*    (3.8-10.6)  k/uL


 


RBC  3.41 L   2.60 L  (3.80-5.40)  m/uL


 


Hgb  10.3 L   8.2 L D  (11.4-16.0)  gm/dL


 


Hct  32.3 L   25.5 L  (34.0-46.0)  %


 


RDW  19.8 H   20.0 H  (11.5-15.5)  %


 


Plt Count  68 L D   52 L  (150-450)  k/uL


 


Neutrophils # (Manual)  0.40 L*    (1.3-7.7)  k/uL


 


Lymphocytes # (Manual)  0.23 L    (1.0-4.8)  k/uL


 


Metamyelocytes # (Man)  0.02 H    (0)  k/uL


 


Myelocytes # (Manual)  0.02 H    (0)  k/uL


 


Nucleated RBCs  83 H    (0-0)  /100 WBC


 


BUN   41 H   (7-17)  mg/dL


 


Creatinine   1.78 H   (0.52-1.04)  mg/dL


 


Calcium   18.1 H*   (8.4-10.2)  mg/dL


 


AST   177 H   (14-36)  U/L


 


ALT   75 H   (4-34)  U/L


 


Alkaline Phosphatase   203 H   ()  U/L


 


Total Protein     (6.3-8.2)  g/dL


 


Albumin     (3.5-5.0)  g/dL


 


Lipase   3253 H   ()  U/L














  20 Range/Units





  09:02 


 


WBC   (3.8-10.6)  k/uL


 


RBC   (3.80-5.40)  m/uL


 


Hgb   (11.4-16.0)  gm/dL


 


Hct   (34.0-46.0)  %


 


RDW   (11.5-15.5)  %


 


Plt Count   (150-450)  k/uL


 


Neutrophils # (Manual)   (1.3-7.7)  k/uL


 


Lymphocytes # (Manual)   (1.0-4.8)  k/uL


 


Metamyelocytes # (Man)   (0)  k/uL


 


Myelocytes # (Manual)   (0)  k/uL


 


Nucleated RBCs   (0-0)  /100 WBC


 


BUN  48 H  (7-17)  mg/dL


 


Creatinine  1.87 H  (0.52-1.04)  mg/dL


 


Calcium  15.8 H*  (8.4-10.2)  mg/dL


 


AST  143 H  (14-36)  U/L


 


ALT  68 H  (4-34)  U/L


 


Alkaline Phosphatase  136 H  ()  U/L


 


Total Protein  5.9 L  (6.3-8.2)  g/dL


 


Albumin  3.2 L  (3.5-5.0)  g/dL


 


Lipase  1319 H  ()  U/L








                               Laboratory Results











WBC  5.1 k/uL (3.8-10.6)   20  09:02    


 


RBC  2.60 m/uL (3.80-5.40)  L  20  09:02    


 


Hgb  8.2 gm/dL (11.4-16.0)  L D 20  09:02    


 


Hct  25.5 % (34.0-46.0)  L  20  09:02    


 


MCV  98.3 fL (80.0-100.0)   20  09:02    


 


MCH  31.5 pg (25.0-35.0)   20  09:02    


 


MCHC  32.1 g/dL (31.0-37.0)   20  09:02    


 


RDW  20.0 % (11.5-15.5)  H  20  09:02    


 


Plt Count  52 k/uL (150-450)  L  20  09:02    


 


Neutrophils % (Manual)  53 %  01/10/20  20:03    


 


Band Neutrophils %  7 %  01/10/20  20:03    


 


Lymphocytes % (Manual)  29 %  01/10/20  20:03    


 


Monocytes % (Manual)  7 %  01/10/20  20:03    


 


Eosinophils % (Manual)  1 %  01/10/20  20:03    


 


Basophils % (Manual)  1 %  01/10/20  20:03    


 


Metamyelocytes %  2 %  01/10/20  20:03    


 


Myelocytes %  3 %  01/10/20  20:03    


 


Neutrophils # (Manual)  0.40 k/uL (1.3-7.7)  L*  01/10/20  20:03    


 


Lymphocytes # (Manual)  0.23 k/uL (1.0-4.8)  L  01/10/20  20:03    


 


Monocytes # (Manual)  0.06 k/uL (0-1.0)   01/10/20  20:03    


 


Eosinophils # (Manual)  0.01 k/uL (0-0.7)   01/10/20  20:03    


 


Basophils # (Manual)  0.01 k/uL (0-0.2)   01/10/20  20:03    


 


Metamyelocytes # (Man)  0.02 k/uL (0)  H  01/10/20  20:03    


 


Myelocytes # (Manual)  0.02 k/uL (0)  H  01/10/20  20:03    


 


Nucleated RBCs  83 /100 WBC (0-0)  H  01/10/20  20:03    


 


Manual Slide Review  Performed   01/10/20  20:03    


 


Polychromasia  Present   01/10/20  20:03    


 


Hypochromasia  Moderate   20  09:02    


 


Poikilocytosis  Slight   20  09:02    


 


Poikilocytosis (manual  Present   01/10/20  20:03    


 


Anisocytosis  Moderate   20  09:02    


 


Macrocytosis  Slight   20  09:02    


 


Stomatocytes  Present   01/10/20  20:03    


 


Sodium  139 mmol/L (137-145)   20  09:02    


 


Potassium  4.2 mmol/L (3.5-5.1)   20  09:02    


 


Chloride  106 mmol/L ()   20  09:02    


 


Carbon Dioxide  27 mmol/L (22-30)   20  09:02    


 


Anion Gap  6 mmol/L  20  09:02    


 


BUN  48 mg/dL (7-17)  H  20  09:02    


 


Creatinine  1.87 mg/dL (0.52-1.04)  H  20  09:02    


 


Est GFR (CKD-EPI)AfAm  34  (>60 ml/min/1.73 sqM)   20  09:02    


 


Est GFR (CKD-EPI)NonAf  30  (>60 ml/min/1.73 sqM)   20  09:02    


 


Glucose  80 mg/dL (74-99)   20  09:02    


 


Calcium  15.8 mg/dL (8.4-10.2)  H*  20  09:02    


 


Total Bilirubin  0.9 mg/dL (0.2-1.3)   20  09:02    


 


AST  143 U/L (14-36)  H  20  09:02    


 


ALT  68 U/L (4-34)  H  20  09:02    


 


Alkaline Phosphatase  136 U/L ()  H  20  09:02    


 


Creatine Kinase  38 U/L ()   01/10/20  20:03    


 


Total Protein  5.9 g/dL (6.3-8.2)  L  20  09:02    


 


Albumin  3.2 g/dL (3.5-5.0)  L  20  09:02    


 


Lipase  1319 U/L ()  H  20  09:02    














Thrombosis Risk Factor Assmnt





- DVT/VTE Prophylaxis


DVT/VTE Prophylaxis: Pharmacologic Prophylaxis ordered





- Choose All That Apply


Each Factor Represents 1 point: Age 41-60 years, Obesity (BMI >25)


Other Risk Factors: No


Each Risk Factor Represents 2 Points: Malignancy


Thrombosis Risk Factor Assessment Total Risk Factor Score: 4


Thrombosis Risk Factor Assessment Level: Moderate Risk





Assessment and Plan


Plan: 








1.  Critical Hypercalcemia secondary to malignancy with metastatic disease to 

the bone.  Nephrology consult appreciated.  IV fluids decreased to 100 mL per 

hour, pamidronate IV, 19 mg 1 dose, discontinue vitamin D.  Electrophoresis 

studies negative.  Appropriately suppressed PTH from previous workup, consult 

with Dr. Ramirez, Dr. Carr.  Patient is not on any calcium smoke supplementation

at home,





2.  Acute pancreatitis, most likely secondary to hypercalcemia, prior history of

cholecystectomy with sphincterotomy, obtain abdominal ultrasound, which is 

negative for common bile duct outpatient, nothing by mouth to clear liquid diet,

antiemetics,





3.  Pancytopenia with Breast cancer with bone metastasis status post multiple 

surgical interventions.  Oncology consult.





4.  Paroxysmal atrial fibrillation.  Continue Toprol-XL 25 mg in the morning and

50 mg at bedtime, Xarelto 20 mg daily.





5.  Acute renal failure with underlying CTk stage I, secondary to dehydration, 

ATN, IV fluids for hydration, patient is on IV Lasix as well as IV fluids for 

severe hypercalcemia, IV Lasix and admitted to be discontinued once calcium is 

stabilized.





6.  Hypothyroidism.  Continue levothyroxine 175 g daily.





5.  Gastroesophageal reflux disease and GI prophylaxis.  Continue Protonix.





7.  Chronic pain syndrome.  Continue Voltaren gel, Robaxin 750 mg every 6 hours 

as needed, OxyContin 30 mg extended release every 12 hours, oxycodone 10 mg 

every 4 hours as needed.





8  Transaminitis, acute, possibly possibly related to ROOT, alkaline phosphatase

elevations also noted most likely secondary to bone  metastases, doubt 

obstructive etiology, cant r/o other itioloty include liver med





9  History of thyroid cancer.





10.  Generalized anxiety disorder.  Continue Xanax or 0.5 mg 4 times daily as 

needed.





DVT prophylaxis on chronic Xarelto dose





GI prophylaxis Protonix





Discharge plan: Return home with [facility]

## 2020-01-12 LAB
ALBUMIN SERPL-MCNC: 2.7 G/DL (ref 3.5–5)
ALP SERPL-CCNC: 143 U/L (ref 38–126)
ALT SERPL-CCNC: 75 U/L (ref 4–34)
ANION GAP SERPL CALC-SCNC: 6 MMOL/L
AST SERPL-CCNC: 155 U/L (ref 14–36)
BASOPHILS # BLD MANUAL: 0.03 K/UL (ref 0–0.2)
BUN SERPL-SCNC: 53 MG/DL (ref 7–17)
CALCIUM SPEC-MCNC: 12.9 MG/DL (ref 8.4–10.2)
CELLS COUNTED: 200
CELLS COUNTED: 200
CHLORIDE SERPL-SCNC: 109 MMOL/L (ref 98–107)
CO2 SERPL-SCNC: 26 MMOL/L (ref 22–30)
ERYTHROCYTE [DISTWIDTH] IN BLOOD BY AUTOMATED COUNT: 2.17 M/UL (ref 3.8–5.4)
ERYTHROCYTE [DISTWIDTH] IN BLOOD BY AUTOMATED COUNT: 2.21 M/UL (ref 3.8–5.4)
ERYTHROCYTE [DISTWIDTH] IN BLOOD: 20 % (ref 11.5–15.5)
ERYTHROCYTE [DISTWIDTH] IN BLOOD: 20 % (ref 11.5–15.5)
GLUCOSE BLD-MCNC: 119 MG/DL (ref 75–99)
GLUCOSE SERPL-MCNC: 88 MG/DL (ref 74–99)
HCT VFR BLD AUTO: 21.1 % (ref 34–46)
HCT VFR BLD AUTO: 21.6 % (ref 34–46)
HGB BLD-MCNC: 6.7 GM/DL (ref 11.4–16)
HGB BLD-MCNC: 7.1 GM/DL (ref 11.4–16)
HYALINE CASTS UR QL AUTO: 3 /LPF (ref 0–2)
LYMPHOCYTES # BLD MANUAL: 0.56 K/UL (ref 1–4.8)
LYMPHOCYTES # BLD MANUAL: 0.61 K/UL (ref 1–4.8)
MAGNESIUM SPEC-SCNC: 1.8 MG/DL (ref 1.6–2.3)
MCH RBC QN AUTO: 30.9 PG (ref 25–35)
MCH RBC QN AUTO: 32.1 PG (ref 25–35)
MCHC RBC AUTO-ENTMCNC: 31.7 G/DL (ref 31–37)
MCHC RBC AUTO-ENTMCNC: 32.7 G/DL (ref 31–37)
MCV RBC AUTO: 97.5 FL (ref 80–100)
MCV RBC AUTO: 98.1 FL (ref 80–100)
METAMYELOCYTES # BLD: 0.07 K/UL
METAMYELOCYTES # BLD: 0.07 K/UL
MONOCYTES # BLD MANUAL: 0.07 K/UL (ref 0–1)
MONOCYTES # BLD MANUAL: 0.46 K/UL (ref 0–1)
MYELOCYTES # BLD MANUAL: 0.14 K/UL
NEUTROPHILS NFR BLD MANUAL: 62 %
NEUTROPHILS NFR BLD MANUAL: 72 %
NEUTS SEG # BLD MANUAL: 2.3 K/UL (ref 1.3–7.7)
NEUTS SEG # BLD MANUAL: 2.6 K/UL (ref 1.3–7.7)
PH UR: 5 [PH] (ref 5–8)
PLATELET # BLD AUTO: 40 K/UL (ref 150–450)
PLATELET # BLD AUTO: 44 K/UL (ref 150–450)
POTASSIUM SERPL-SCNC: 3.8 MMOL/L (ref 3.5–5.1)
PROT SERPL-MCNC: 5.3 G/DL (ref 6.3–8.2)
PROT UR QL: (no result)
RBC UR QL: 10 /HPF (ref 0–5)
SODIUM SERPL-SCNC: 141 MMOL/L (ref 137–145)
SP GR UR: 1.01 (ref 1–1.03)
SQUAMOUS UR QL AUTO: 1 /HPF (ref 0–4)
T4 FREE SERPL-MCNC: 1.06 NG/DL (ref 0.78–2.19)
UROBILINOGEN UR QL STRIP: <2 MG/DL (ref ?–2)
WBC # BLD AUTO: 3.4 K/UL (ref 3.8–10.6)
WBC # BLD AUTO: 3.5 K/UL (ref 3.8–10.6)
WBC # UR AUTO: 2 /HPF (ref 0–5)

## 2020-01-12 PROCEDURE — 30233N1 TRANSFUSION OF NONAUTOLOGOUS RED BLOOD CELLS INTO PERIPHERAL VEIN, PERCUTANEOUS APPROACH: ICD-10-PCS

## 2020-01-12 RX ADMIN — CEFAZOLIN SCH MLS/HR: 330 INJECTION, POWDER, FOR SOLUTION INTRAMUSCULAR; INTRAVENOUS at 02:38

## 2020-01-12 RX ADMIN — PANTOPRAZOLE SODIUM SCH MG: 40 TABLET, DELAYED RELEASE ORAL at 08:12

## 2020-01-12 RX ADMIN — CEFAZOLIN SCH MLS/HR: 330 INJECTION, POWDER, FOR SOLUTION INTRAMUSCULAR; INTRAVENOUS at 10:53

## 2020-01-12 RX ADMIN — OXYCODONE HYDROCHLORIDE SCH MG: 15 TABLET, FILM COATED, EXTENDED RELEASE ORAL at 08:10

## 2020-01-12 RX ADMIN — CEFAZOLIN SCH MLS/HR: 330 INJECTION, POWDER, FOR SOLUTION INTRAMUSCULAR; INTRAVENOUS at 21:13

## 2020-01-12 RX ADMIN — METOPROLOL SUCCINATE SCH: 25 TABLET, EXTENDED RELEASE ORAL at 08:50

## 2020-01-12 RX ADMIN — OXYCODONE HYDROCHLORIDE SCH MG: 15 TABLET, FILM COATED, EXTENDED RELEASE ORAL at 21:15

## 2020-01-12 RX ADMIN — METOPROLOL SUCCINATE SCH MG: 50 TABLET, EXTENDED RELEASE ORAL at 21:18

## 2020-01-12 RX ADMIN — DOCUSATE SODIUM PRN MG: 100 CAPSULE, LIQUID FILLED ORAL at 08:12

## 2020-01-12 RX ADMIN — CALCITONIN SALMON SCH UNIT: 200 INJECTION, SOLUTION INTRAMUSCULAR; SUBCUTANEOUS at 21:28

## 2020-01-12 RX ADMIN — CALCITONIN SALMON SCH UNIT: 200 INJECTION, SOLUTION INTRAMUSCULAR; SUBCUTANEOUS at 08:25

## 2020-01-12 RX ADMIN — LEVOTHYROXINE SODIUM SCH MCG: 88 TABLET ORAL at 04:59

## 2020-01-12 RX ADMIN — DEXTROSE SCH MLS/HR: 50 INJECTION, SOLUTION INTRAVENOUS at 21:13

## 2020-01-12 RX ADMIN — RIVAROXABAN SCH MG: 15 TABLET, FILM COATED ORAL at 16:59

## 2020-01-12 RX ADMIN — CALCITONIN SALMON SCH: 200 INJECTION, SOLUTION INTRAMUSCULAR; SUBCUTANEOUS at 08:12

## 2020-01-12 NOTE — P.CONS
History of Present Illness





- Reason for Consult


Consult date: 20





- Chief Complaint


metastatic breast cancer hypercalcemia





- History of Present Illness








This is a 54-year-old female with significant confusion currently admitted for 

treatment of hypercalcemia, altered mental sensorium, with a past medical 

history of breast cancer metastatic,


For his under care of Dr. Carr,  significant nausea and vomiting. She is 

confused as previous history of hypercalcemia.initially diagnosed in  with 

limited stage breast cancer,  subsequently developed metastatic disease in  

with extensive bone metastases status post multiple lines of treatment was 

following [place] [facility] professionals at [place].  subsequently 

switched care to Dr. Carr after she was discharged from the hospital in 2019.


 Currently on chemotherapy, pancytopenic








Review of Systems





patient is confused, lethargic altered mental sensorium.





Past Medical History


Past Medical History: Atrial Fibrillation, Cancer, Fibromyalgia, GERD/Reflux, 

Thyroid Disorder


Additional Past Medical History / Comment(s): breast cancer status post 

lumpectomy in  with recurrence of invasive ductal carcinoma stage III grade 

IIIa with lumpectomy 2014 with bone metastasis and multiple pathologic 

compression fractures.  Status post cervical fusion, right femur nail and 

titanium david all positive for cancer in 2019, kyphoplasty L3 followed by 

kyphoplasty T12, L1, L5-S1 with radiofrequency ablation 2019, history of

thyroid cancer status post lobectomy and radiation therapy, chronic atrial 

fibrillation status post ablation on Xarelto, hypothyroidism, chronic pain 

syndrome, gastroesophageal reflux disease, generalized anxiety disorder.


History of Any Multi-Drug Resistant Organisms: None Reported


Past Surgical History: Ablation, Back Surgery, Cholecystectomy, Hysterectomy, 

Orthopedic Surgery


Additional Past Surgical History / Comment(s): lumpectomy left breast x3, 

lobectomy thyroid, c5 c6 c7 fusion, bone marrow biopsy, bar/nail in right thigh 

and plate in right arm, ablation on heart , gallstone removed from bile 

duct, left breast re-incision for clear margins in ,port plcement, 2015 had 

port removed, kyphoplasty on back x3 and radiofrequncy ablation to back,


Past Anesthesia/Blood Transfusion Reactions: Previous Problems w/ Anesthesia


Additional Past Anesthesia/Blood Transfusion Reaction / Comm: Hard time waking 

up and hypothermia


Past Psychological History: Depression


Additional Psychological History / Comment(s): Patient lives in ranch style home

with . Patient walks with walker.


Smoking Status: Never smoker


Past Alcohol Use History: None Reported


Additional Past Alcohol Use History / Comment(s): Patient is a lifelong 

nonsmoker, or illicit drug use. Used to smoke marijuana but hasnt for the last 

year.


Past Drug Use History: None Reported





- Past Family History


  ** Son(s)


History Unknown: Yes


Additional Family Medical History / Comment(s): Small nodule on thyroid, no 

other history





  ** Father


Family Medical History: No Reported History


Additional Family Medical History / Comment(s): Patient does not know any 

history on her father.





  ** Mother


Family Medical History: COPD


Additional Family Medical History / Comment(s): Mother  at age 83 from a 

perforated bowel with fistula to the bladder, COPD history.





  ** Brother(s)


Additional Family Medical History / Comment(s): Patient has 1 brother that had 

part of his colon removed.  Patient does not have any sisters.





Medications and Allergies


                                Home Medications











 Medication  Instructions  Recorded  Confirmed  Type


 


Levothyroxine Sodium [Synthroid] 175 mcg PO DAILY@0500 02/18/18 01/10/20 History


 


Loratadine 10 mg PO DAILY PRN 02/18/18 01/10/20 History


 


Acetaminophen Tab [Tylenol] 1,000 mg PO Q8H 11/18/19 01/10/20 History


 


HYDROmorphone HCL 2 mg PO Q6H PRN 11/18/19 01/10/20 History


 


Methocarbamol [Robaxin] 1,500 mg PO Q6H PRN 11/18/19 01/10/20 History


 


Metoprolol Succinate (ER) [Toprol 25 mg PO DAILY 11/18/19 01/10/20 History





XL]    


 


Metoprolol Succinate (ER) [Toprol 50 mg PO HS 11/18/19 01/10/20 History





XL]    


 


Polyethylene Glycol 3350 [Miralax] 17 gm PO DAILY 11/18/19 01/10/20 History


 


Potassium Chloride [Klor-Con 20] 20 meq PO DAILY 11/18/19 01/10/20 History


 


Rivaroxaban [Xarelto] 20 mg PO W/SUPPER 11/18/19 01/10/20 History


 


Sennosides [Senna] 8.6 mg PO BID 11/18/19 01/10/20 History


 


oxyCODONE HCL [Roxicodone] 10 mg PO Q4H PRN 11/18/19 01/10/20 History


 


Docusate [Colace] 100 mg PO DAILY PRN 01/10/20 01/10/20 History


 


LORazepam [Ativan] 0.5 mg PO TID PRN 01/10/20 01/10/20 History


 


Omeprazole 20 mg PO DAILY 01/10/20 01/10/20 History


 


Palbociclib [Ibrance] 100 mg PO AS DIRECTED 01/10/20 01/10/20 History


 


oxyCODONE HCL [OxyCONTIN] 30 mg PO Q12H 01/10/20 01/10/20 History








                                    Allergies











Allergy/AdvReac Type Severity Reaction Status Date / Time


 


gabapentin Allergy  Rash/Hives Verified 01/10/20 21:58


 


vancomycin Allergy  Rash/Hives Verified 01/10/20 21:58


 


morphine [From MS Contin] AdvReac  Nausea & Verified 01/10/20 21:58





   Vomiting &  





   Diarrhea  














Physical Exam


Vitals: 


                                   Vital Signs











  Temp Pulse Resp BP Pulse Ox


 


 20 15:43   79  16  


 


 20 11:36  98.4 F  102 H  16  134/60  95


 


 20 04:43  98.2 F  105 H  15  95/52  95


 


 20 21:28  100.5 F H  110 H  16  112/68  97








                                Intake and Output











 20





 06:59 14:59 22:59


 


Intake Total 1000 1680 


 


Output Total 550 550 850


 


Balance 450 1130 -850


 


Intake:   


 


  Intake, IV Titration 800 1200 





  Amount   


 


    Sodium Chloride 0.9% 1, 800 1200 





    000 ml @ 100 mls/hr IV .   





    Q10H [facility] Rx#:[id number]   


 


  Oral 200 480 


 


Output:   


 


  Urine 550 550 850


 


Other:   


 


  Voiding Method Indwelling Catheter Indwelling Catheter Indwelling Catheter


 


  # Voids  1 1














The patient appeared well nourished and normally developed. Vital signs as 

documented. Head exam is unremarkable. No scleral icterus or corneal arcus 

noted. Neck is without jugular venous distension, thyromegaly, or carotid 

bruits. Carotid upstrokes are brisk bilaterally. Lungs are clear to auscultation

and percussion. Cardiac exam reveals the PMI to be normally sized and situated. 

Rhythm is regular. First and second heart sounds normal. No murmurs, rubs or 

gallops. Abdominal exam reveals normal bowel sounds, no masses, no organomegaly 

and no aortic enlargement. Extremities are nonedematous and both femoral and 

pedal pulses are normal.





Results


CBC & Chem 7: 


                                 20 17:35





                                 20 07:18


Labs: 


                  Abnormal Lab Results - Last 24 Hours (Table)











  20 Range/Units





  04:55 07:18 07:18 


 


WBC   3.5 L   (3.8-10.6)  k/uL


 


RBC   2.21 L   (3.80-5.40)  m/uL


 


Hgb   7.1 L   (11.4-16.0)  gm/dL


 


Hct   21.6 L   (34.0-46.0)  %


 


RDW   20.0 H   (11.5-15.5)  %


 


Plt Count   44 L   (150-450)  k/uL


 


Lymphocytes # (Manual)   0.56 L   (1.0-4.8)  k/uL


 


Metamyelocytes # (Man)   0.07 H   (0)  k/uL


 


Myelocytes # (Manual)   0.14 H   (0)  k/uL


 


Nucleated RBCs   65 H   (0-0)  /100 WBC


 


Chloride    109 H  ()  mmol/L


 


BUN    53 H  (7-17)  mg/dL


 


Creatinine    2.38 H  (0.52-1.04)  mg/dL


 


Calcium    12.9 H  (8.4-10.2)  mg/dL


 


AST    155 H  (14-36)  U/L


 


ALT    75 H  (4-34)  U/L


 


Alkaline Phosphatase    143 H  ()  U/L


 


Total Protein    5.3 L  (6.3-8.2)  g/dL


 


Albumin    2.7 L  (3.5-5.0)  g/dL


 


Lipase    549 H  ()  U/L


 


TSH     (0.465-4.680)  mIU/L


 


Urine Appearance  Cloudy H    (Clear)  


 


Urine Protein  1+ H    (Negative)  


 


Urine Ketones  Trace H    (Negative)  


 


Urine Blood  Moderate H    (Negative)  


 


Urine RBC  10 H    (0-5)  /hpf


 


Amorphous Sediment  Occasional H    (None)  /hpf


 


Urine Bacteria  Rare H    (None)  /hpf


 


Hyaline Casts  3 H    (0-2)  /lpf


 


Urine Mucus  Rare H    (None)  /hpf














  20 Range/Units





  07:18 17:35 


 


WBC   3.4 L  (3.8-10.6)  k/uL


 


RBC   2.17 L  (3.80-5.40)  m/uL


 


Hgb   6.7 L*  (11.4-16.0)  gm/dL


 


Hct   21.1 L  (34.0-46.0)  %


 


RDW   20.0 H  (11.5-15.5)  %


 


Plt Count   40 L  (150-450)  k/uL


 


Lymphocytes # (Manual)   0.61 L  (1.0-4.8)  k/uL


 


Metamyelocytes # (Man)   0.07 H  (0)  k/uL


 


Myelocytes # (Manual)    (0)  k/uL


 


Nucleated RBCs   46 H  (0-0)  /100 WBC


 


Chloride    ()  mmol/L


 


BUN    (7-17)  mg/dL


 


Creatinine    (0.52-1.04)  mg/dL


 


Calcium    (8.4-10.2)  mg/dL


 


AST    (14-36)  U/L


 


ALT    (4-34)  U/L


 


Alkaline Phosphatase    ()  U/L


 


Total Protein    (6.3-8.2)  g/dL


 


Albumin    (3.5-5.0)  g/dL


 


Lipase    ()  U/L


 


TSH  0.062 L   (0.465-4.680)  mIU/L


 


Urine Appearance    (Clear)  


 


Urine Protein    (Negative)  


 


Urine Ketones    (Negative)  


 


Urine Blood    (Negative)  


 


Urine RBC    (0-5)  /hpf


 


Amorphous Sediment    (None)  /hpf


 


Urine Bacteria    (None)  /hpf


 


Hyaline Casts    (0-2)  /lpf


 


Urine Mucus    (None)  /hpf














Assessment and Plan


Assessment: 








Impression and plan:





1.  Hypercalcemia secondary to breast cancer and bony metastasis:altered mental 

sensorium:


-  low PTH.  consistent with hypercalcemia of malignancy. 


-  that is post pamidronate, continues to be on calcitonin,  IV hydration. 


-  will add Decadron.40 mg IV once. 


- if continues to have hypercalcemia, consider repeating pamidronate. 





2.  Metastatic breast cancer: breast cancer to bone only ER 30%, prior to this 

was on faslodex and on Ibrance , currently on chemotherapy. post kyphoplasty 


- Long-standing history of breast cancer since  status post lumpectomy with 

recurrence in 2014 with bony metastasis and pathological compression 

fractures.


- Significant history of metastatic breast cancer with bony metastases. Follows 

Dr. Carr.


- Extensive bony disease currently. 


-  Currently on chemotherapy.


- Resume chemotherapy as outpatient.








3.  Pancytopenia secondary to chemotherapy:


-  transfuse to keep hemoglobin above 7. Platelets about 10,000.


- plan for one unit of PRBC today.





4. Acute kidney injury secondary to acute tubular necrosis:


-  IV hydration.





5.  Pancreatitis:Diffuse abdominal pain.nausea vomiting diarrhea.


-  Rule out infection.  





6. Hypothyroidism, GERD, chronic pain, elevated liver enzymes, history of 

thyroid cancer, generalized anxiety.








Thank you for allowing us to participate in the care of your patient. Please 

feel free to call us with any questions.





Jennifer Carmona MD


, [facility]


Hematology Oncology


[address], [address]


[place] [address]


Office: [phone], Fax: [phone]





Time with Patient: Greater than 30

## 2020-01-12 NOTE — PN
PROGRESS NOTE



DATE OF DICTATION:

January 12, 2020



The patient is a 54-year-old pleasant white female with history of metastatic breast

cancer with bone metastasis, undergoing chemotherapy.  Last chemo was 2 weeks ago,

admitted to the hospital with severe hypercalcemia and acute pancreatitis.  She

complains of diffuse body pain, some abdominal pain.  No nausea, vomiting.  She was

started on a clear liquid diet yesterday, tolerating well.



PHYSICAL EXAMINATION:

Blood pressure 95/52, pulse rate 105, temperature 98.2. HEENT examination unremarkable.

Conjunctivae pink. Sclerae anicteric.  Oral cavity no lesions.

NECK: No JVD or lymph node enlargement.

CHEST: Clear to auscultation.

HEART:  Regular rate and rhythm.

ABDOMEN:  Soft.  Bowel sounds are positive.  There is mild diffuse tenderness mostly in

the epigastric area and periumbilical area.  The rest of the abdomen is benign.

EXTREMITIES:  No pedal edema.

SKIN:  No rashes.

NEUROLOGIC:  Alert and oriented x3.  No focal deficits.



LABS:

From today:  WBC 5.7, hemoglobin 7.1, platelets 44,000.  T-bilirubin is 0.6, ,

ALT 75, alkaline phosphatase is 143, calcium has significantly improved to 12.9.  BUN

and creatinine are 53 and 2.3 respectively.  Lipase is down to 549.



IMPRESSION:

1. Acute pancreatitis secondary to hypercalcemia, which is gradually improving.

    Lipase has improved to 549 today.  On a clear liquid diet, tolerating well.

2. Hypercalcemia, improved.  Today it is 12.9.

3. Metastatic breast cancer, undergoing chemotherapy, last one was 2 weeks ago.

4. Pancytopenia related to recent chemotherapy which is improving.



RECOMMENDATIONS:

1. Continue aggressive hydration.

2. Correct hypercalcemia.

3. Monitor lipase on a daily basis.

4. We will advance to full liquid diet today.

5. We will follow with you closely during her hospital stay.

Thank you for this consultation.





MMODL / IJN: [id number] / Job#: [id number]

## 2020-01-13 LAB
ALBUMIN SERPL-MCNC: 3.1 G/DL (ref 3.5–5)
ALP SERPL-CCNC: 180 U/L (ref 38–126)
ALT SERPL-CCNC: 75 U/L (ref 4–34)
ANION GAP SERPL CALC-SCNC: 7 MMOL/L
AST SERPL-CCNC: 130 U/L (ref 14–36)
BASOPHILS # BLD MANUAL: 0.05 K/UL (ref 0–0.2)
BUN SERPL-SCNC: 51 MG/DL (ref 7–17)
CALCIUM SPEC-MCNC: 11.4 MG/DL (ref 8.4–10.2)
CHLORIDE SERPL-SCNC: 108 MMOL/L (ref 98–107)
CO2 SERPL-SCNC: 23 MMOL/L (ref 22–30)
EOSINOPHIL # BLD MANUAL: 0.05 K/UL (ref 0–0.7)
ERYTHROCYTE [DISTWIDTH] IN BLOOD BY AUTOMATED COUNT: 2.88 M/UL (ref 3.8–5.4)
ERYTHROCYTE [DISTWIDTH] IN BLOOD: 19.5 % (ref 11.5–15.5)
FERRITIN SERPL-MCNC: 3969.3 NG/ML (ref 10–291)
GLUCOSE SERPL-MCNC: 150 MG/DL (ref 74–99)
HCT VFR BLD AUTO: 27.2 % (ref 34–46)
HGB BLD-MCNC: 9.1 GM/DL (ref 11.4–16)
IRON SERPL-MCNC: 67 UG/DL (ref 50–170)
LYMPHOCYTES # BLD MANUAL: 1.42 K/UL (ref 1–4.8)
MCH RBC QN AUTO: 31.5 PG (ref 25–35)
MCHC RBC AUTO-ENTMCNC: 33.4 G/DL (ref 31–37)
MCV RBC AUTO: 94.2 FL (ref 80–100)
METAMYELOCYTES # BLD: 0.1 K/UL
MONOCYTES # BLD MANUAL: 0.34 K/UL (ref 0–1)
MYELOCYTES # BLD MANUAL: 0.15 K/UL
NEUTS SEG # BLD MANUAL: 2.9 K/UL (ref 1.3–7.7)
PLATELET # BLD AUTO: 45 K/UL (ref 150–450)
POTASSIUM SERPL-SCNC: 3.7 MMOL/L (ref 3.5–5.1)
PROT SERPL-MCNC: 6 G/DL (ref 6.3–8.2)
SODIUM SERPL-SCNC: 138 MMOL/L (ref 137–145)
TIBC SERPL-MCNC: 233 UG/DL (ref 228–460)
WBC # BLD AUTO: 4.9 K/UL (ref 3.8–10.6)
WBC # BLD AUTO: 6.3 K/UL (ref 3.8–10.6)

## 2020-01-13 RX ADMIN — METOPROLOL TARTRATE SCH MG: 50 TABLET, FILM COATED ORAL at 20:20

## 2020-01-13 RX ADMIN — DILTIAZEM HYDROCHLORIDE SCH: 5 INJECTION INTRAVENOUS at 02:34

## 2020-01-13 RX ADMIN — OXYCODONE HYDROCHLORIDE SCH MG: 15 TABLET, FILM COATED, EXTENDED RELEASE ORAL at 20:17

## 2020-01-13 RX ADMIN — CEFAZOLIN SCH MLS/HR: 330 INJECTION, POWDER, FOR SOLUTION INTRAMUSCULAR; INTRAVENOUS at 16:24

## 2020-01-13 RX ADMIN — PANTOPRAZOLE SODIUM SCH MG: 40 TABLET, DELAYED RELEASE ORAL at 05:34

## 2020-01-13 RX ADMIN — HYDROMORPHONE HYDROCHLORIDE PRN MG: 1 INJECTION, SOLUTION INTRAMUSCULAR; INTRAVENOUS; SUBCUTANEOUS at 14:32

## 2020-01-13 RX ADMIN — DILTIAZEM HYDROCHLORIDE SCH: 5 INJECTION INTRAVENOUS at 23:48

## 2020-01-13 RX ADMIN — HYDROMORPHONE HYDROCHLORIDE PRN MG: 1 INJECTION, SOLUTION INTRAMUSCULAR; INTRAVENOUS; SUBCUTANEOUS at 05:34

## 2020-01-13 RX ADMIN — OXYCODONE HYDROCHLORIDE SCH MG: 15 TABLET, FILM COATED, EXTENDED RELEASE ORAL at 09:03

## 2020-01-13 RX ADMIN — METOPROLOL TARTRATE SCH MG: 50 TABLET, FILM COATED ORAL at 09:05

## 2020-01-13 RX ADMIN — CALCITONIN SALMON SCH UNIT: 200 INJECTION, SOLUTION INTRAMUSCULAR; SUBCUTANEOUS at 09:03

## 2020-01-13 RX ADMIN — DEXTROSE SCH MLS/HR: 50 INJECTION, SOLUTION INTRAVENOUS at 03:19

## 2020-01-13 RX ADMIN — LEVOTHYROXINE SODIUM SCH MCG: 88 TABLET ORAL at 05:55

## 2020-01-13 RX ADMIN — HYDROMORPHONE HYDROCHLORIDE PRN MG: 1 INJECTION, SOLUTION INTRAMUSCULAR; INTRAVENOUS; SUBCUTANEOUS at 11:33

## 2020-01-13 RX ADMIN — CEFAZOLIN SCH MLS/HR: 330 INJECTION, POWDER, FOR SOLUTION INTRAMUSCULAR; INTRAVENOUS at 20:17

## 2020-01-13 RX ADMIN — METOPROLOL TARTRATE SCH MG: 50 TABLET, FILM COATED ORAL at 16:20

## 2020-01-13 NOTE — PN
PROGRESS NOTE



Patient is seen for followup for acute kidney injury and hypercalcemia.  Serum calcium

level has been improving.  Creatinine has also improved down to 2.0 from 2.3 mg/dL.



PHYSICAL EXAMINATION:

This morning patient was anxious.  She is not in any acute distress.  She was awake.

Blood pressure was 143/93, heart rate 93 per minute, patient is afebrile.

Examination of the heart S1, S2.  Examination of the lungs, decreased breath sounds

bases.

ABDOMEN:  Soft, non-tender.

Examination of lower extremities shows no significant edema.

CNS exam shows patient moving all 4 extremities.



LABS:

Show sodium 138, potassium 3.7, chloride 108, CO2 is 23. BUN 51, creatinine 2.0,

ionized serum calcium 6.7.  Total calcium was 11.4.



ASSESSMENT:

1. Acute kidney injury, ATN and from hypercalcemia, currently improving.  The patient

    is maintained on IV fluids which I will continue.

2. Hypercalcemia secondary to bony metastasis from underlying malignancy which is

    breast cancer, status post pamidronate, currently improving.

3. Metastatic breast cancer with METS to the bone.

4. Pancytopenia secondary to chemotherapy.  No active bleeding noted, status post

    packed RBCs.



PLAN:

Continue IV fluids, repeat labs in a.m.





MMODL / IJN: [id number] / Job#: [id number]

## 2020-01-13 NOTE — MR
EXAMINATION TYPE: MR brain wo/w con

 

DATE OF EXAM: 1/13/2020

 

COMPARISON: None

 

HISTORY: Breast ca r/o brain mets weakness left arm

 

CONTRAST: 

Standard multiplanar, multisequence MRI departmental protocol utilizing 7 mL intravenous Gadavist lisa
olinium contrast.  

 

Ventricles have normal size. There is no mass effect nor midline shift. There is no sign of intracran
ial hemorrhage. Brainstem is intact. There is no evidence of a cortical infarct.

 

There is numerous foci of pathologic enhancement within the diploic space of the calvarium.

 

There is diffuse thickening of the meninges with pathologic enhancement. There is normal contrast enh
ancement of the venous sinuses.

 

IMPRESSION:

Diffuse meningeal enhancement suggestive of metastatic disease.

No evidence of intra-axial metastatic disease.

Extensive calvarial enhancing lesions consistent with osseous metastatic disease.

## 2020-01-13 NOTE — CONS
CONSULTATION



Chhaya Radford is a 54-year-old unfortunate lady with breast cancer, bone metastasis,

hypercalcemia who came into the hospital with these issues.  She also has pancytopenia

and required a blood transfusion.  She developed atrial fibrillation with rapid

ventricular rate and I was asked to see her in this regard.  She was being treated for

hypercalcemia.  However, she has converted to sinus rhythm.  She remains in sinus

rhythm at this time.  She has been on Xarelto 20 mg daily.  In the light of low

platelet count and low hemoglobin requiring blood transfusion, I am recommending that

we discontinue the Xarelto altogether and use metoprolol tartrate 50 mg t.i.d. for rate

control.  EKG reveals a sinus rhythm.  She is resting comfortably, but appears quite

exhausted.



PAST MEDICAL HISTORY:

Remarkable for paroxysmal atrial fibrillation on Xarelto in the past, fibromyalgia,

history of breast cancer with metastatic disease, thyroid disease.  She is status post

lumpectomy in 2011 with recurrence of cancer and she now has bone METS with

hyperkalemia.  She is status post some back surgery, cholecystectomy, hysterectomy as

well.



MEDICATIONS:

At home include pain medications, oxycodone, Xarelto 20 mg daily, metoprolol succinate

50 mg daily, muscle relaxants, she also takes omeprazole and Tylenol and Synthroid 175

mcg daily.



ALLERGIES:

She is allergic to GABAPENTIN, VANCOMYCIN and MORPHINE.



PHYSICAL EXAMINATION:

Blood pressure is 140/70, pulse rate is about 90, sinus.

HEENT:  Unremarkable.  Fundus was not examined by me.

Neck is supple.  There is no clear-cut JVD.  No carotid bruit.

Heart exam reveals S1, S2 with the rhythm being regular.  There is a short systolic

murmur.  Lungs reveal diminished air entry.

Abdomen is soft, nontender.

Lower extremities reveal diminished pulses.

Central nervous system is grossly within normal limits.



EKG revealed sinus mechanism, nonspecific ST changes.  Echo from November of this year

revealed ejection fraction of 50%-55% without pulmonary hypertension.  No significant

abnormality was noted on the Doppler exam and this was from November 22, 2019.



IMPRESSION:

1. Atrial fibrillation with rapid ventricular rate, now converted to sinus rhythm.

2. Breast cancer with bone METS and hypercalcemia.

3. History of hypertension.

4. Patient has had previous ablation for atrial fibrillation in the past.



RECOMMENDATION:

I am recommending metoprolol tartrate 50 mg t.i.d., discontinue Xarelto and

hyperkalemia has improved, calcium levels have come down nicely.



Thank you very much for the consult.





MMLYNN / IJN: [id number] / Job#: [id number]

## 2020-01-14 LAB
ANION GAP SERPL CALC-SCNC: 8 MMOL/L
APTT BLD: 21.4 SEC (ref 22–30)
BUN SERPL-SCNC: 51 MG/DL (ref 7–17)
CALCIUM SPEC-MCNC: 9.8 MG/DL (ref 8.4–10.2)
CHLORIDE SERPL-SCNC: 109 MMOL/L (ref 98–107)
CO2 SERPL-SCNC: 23 MMOL/L (ref 22–30)
ERYTHROCYTE [DISTWIDTH] IN BLOOD BY AUTOMATED COUNT: 2.79 M/UL (ref 3.8–5.4)
ERYTHROCYTE [DISTWIDTH] IN BLOOD: 19.6 % (ref 11.5–15.5)
FIBRINOGEN PPP-MCNC: 557 MG/DL (ref 200–500)
GLUCOSE SERPL-MCNC: 124 MG/DL (ref 74–99)
HCT VFR BLD AUTO: 25.9 % (ref 34–46)
HGB BLD-MCNC: 8.5 GM/DL (ref 11.4–16)
INR PPP: 1.3 (ref ?–1.2)
MCH RBC QN AUTO: 30.4 PG (ref 25–35)
MCHC RBC AUTO-ENTMCNC: 32.8 G/DL (ref 31–37)
MCV RBC AUTO: 92.7 FL (ref 80–100)
PLATELET # BLD AUTO: 38 K/UL (ref 150–450)
POTASSIUM SERPL-SCNC: 3 MMOL/L (ref 3.5–5.1)
PT BLD: 13 SEC (ref 9–12)
SODIUM SERPL-SCNC: 140 MMOL/L (ref 137–145)
WBC # BLD AUTO: 6.3 K/UL (ref 3.8–10.6)

## 2020-01-14 RX ADMIN — METOPROLOL TARTRATE SCH MG: 50 TABLET, FILM COATED ORAL at 16:13

## 2020-01-14 RX ADMIN — METOPROLOL TARTRATE SCH MG: 50 TABLET, FILM COATED ORAL at 20:48

## 2020-01-14 RX ADMIN — POTASSIUM CHLORIDE SCH MEQ: 20 TABLET, EXTENDED RELEASE ORAL at 09:43

## 2020-01-14 RX ADMIN — POTASSIUM CHLORIDE SCH MEQ: 20 TABLET, EXTENDED RELEASE ORAL at 07:41

## 2020-01-14 RX ADMIN — LEVOTHYROXINE SODIUM SCH MCG: 88 TABLET ORAL at 07:41

## 2020-01-14 RX ADMIN — ONDANSETRON PRN MG: 2 INJECTION INTRAMUSCULAR; INTRAVENOUS at 13:53

## 2020-01-14 RX ADMIN — CEFAZOLIN SCH MLS/HR: 330 INJECTION, POWDER, FOR SOLUTION INTRAMUSCULAR; INTRAVENOUS at 07:48

## 2020-01-14 RX ADMIN — OXYCODONE HYDROCHLORIDE SCH MG: 15 TABLET, FILM COATED, EXTENDED RELEASE ORAL at 20:48

## 2020-01-14 RX ADMIN — CEFAZOLIN SCH MLS/HR: 330 INJECTION, POWDER, FOR SOLUTION INTRAMUSCULAR; INTRAVENOUS at 20:52

## 2020-01-14 RX ADMIN — PANTOPRAZOLE SODIUM SCH MG: 40 TABLET, DELAYED RELEASE ORAL at 07:41

## 2020-01-14 RX ADMIN — OXYCODONE HYDROCHLORIDE SCH MG: 15 TABLET, FILM COATED, EXTENDED RELEASE ORAL at 08:00

## 2020-01-14 RX ADMIN — HYDROMORPHONE HYDROCHLORIDE PRN MG: 1 INJECTION, SOLUTION INTRAMUSCULAR; INTRAVENOUS; SUBCUTANEOUS at 02:47

## 2020-01-14 RX ADMIN — HYDROMORPHONE HYDROCHLORIDE PRN MG: 1 INJECTION, SOLUTION INTRAMUSCULAR; INTRAVENOUS; SUBCUTANEOUS at 23:23

## 2020-01-14 RX ADMIN — METOPROLOL TARTRATE SCH MG: 50 TABLET, FILM COATED ORAL at 08:01

## 2020-01-14 RX ADMIN — ONDANSETRON PRN MG: 2 INJECTION INTRAMUSCULAR; INTRAVENOUS at 22:30

## 2020-01-14 RX ADMIN — HYDROMORPHONE HYDROCHLORIDE PRN MG: 1 INJECTION, SOLUTION INTRAMUSCULAR; INTRAVENOUS; SUBCUTANEOUS at 16:08

## 2020-01-14 NOTE — PN
PROGRESS NOTE



Mrs. Radford is a lady with breast cancer, bone metastasis with hypercalcemia, also has

paroxysmal atrial fib.  I am seeing her for atrial fib.  Her rhythm is now in sinus.

She is off the Cardizem drip.  She is not on any anticoagulation.  She is

hemodynamically stable, but totally confused and recent CAT scan suggests that there is

evidence of brain mets as well.  Overall prognosis for this lady is poor.



She is hemodynamically stable.

S1-S2 heard normally, short systolic murmur noted.

Lungs reveal diminished air entry.

Abdomen and lower extremity exam is unchanged.

Central nervous system examination was not performed.



IMPRESSION:

1. Metastatic breast cancer with bone metastasis and now with brain metastasis.

2. Paroxysmal atrial fibrillation, now in sinus rhythm.

3. Hypertension.



RECOMMENDATIONS:

From a cardiac standpoint, I would recommend to continue current medications.  No other

intervention necessary.  We will continue to see her as needed.





MMODL / IJN: [id number] / Job#: [id number]

## 2020-01-14 NOTE — PN
PROGRESS NOTE



Patient is seen for followup for acute kidney injury and hypercalcemia.  Her renal

function has improved.  Creatinine is down to 1.4 today and calcium is down to 9.8.

The patient is maintained on IV fluids. She is trying to eat as well.



PHYSICAL EXAMINATION:

On examination, blood pressure 165/91, heart rate 83 per minute. Patient is afebrile.

EXAMINATION OF THE HEART: S1, S2.

EXAMINATION OF THE LUNGS:  Bilateral breath sounds are heard.

ABDOMEN:  Soft, nontender.

Examination of lower extremities shows no significant edema.

CNS EXAM: Grossly intact.



LABS:

Labs show sodium 140, potassium 3.0, chloride 109, BUN 51, creatinine 1.54, hemoglobin

8.5 g/dL.



ASSESSMENT:

1. Acute kidney injury secondary to hypercalcemia and acute tubular necrosis,

    currently improved.  I will decrease the IV fluids to about 60 mL an hour.  Patient

    is encouraged to increase oral intake as well.

2. Hypokalemia associated with decreased oral intake and urinary losses as well.

    Continue to replace.

3. Hypercalcemia from bony metastasis from breast cancer, status post Pamidronate,

    currently improving.



PLAN:

Decrease IV fluids to about 60 mL an hour.  Repeat labs in a.m.





MMODL / IJN: [id number] / Job#: [id number]

## 2020-01-15 LAB
ANION GAP SERPL CALC-SCNC: 5 MMOL/L
BUN SERPL-SCNC: 48 MG/DL (ref 7–17)
CALCIUM SPEC-MCNC: 9.7 MG/DL (ref 8.4–10.2)
CELLS COUNTED: 200
CHLORIDE SERPL-SCNC: 111 MMOL/L (ref 98–107)
CO2 SERPL-SCNC: 24 MMOL/L (ref 22–30)
EOSINOPHIL # BLD MANUAL: 0.12 K/UL (ref 0–0.7)
ERYTHROCYTE [DISTWIDTH] IN BLOOD BY AUTOMATED COUNT: 2.76 M/UL (ref 3.8–5.4)
ERYTHROCYTE [DISTWIDTH] IN BLOOD: 19.5 % (ref 11.5–15.5)
GLUCOSE BLD-MCNC: 90 MG/DL (ref 75–99)
GLUCOSE BLD-MCNC: 91 MG/DL (ref 75–99)
GLUCOSE SERPL-MCNC: 68 MG/DL (ref 74–99)
HCT VFR BLD AUTO: 25.6 % (ref 34–46)
HGB BLD-MCNC: 8.5 GM/DL (ref 11.4–16)
LYMPHOCYTES # BLD MANUAL: 1.04 K/UL (ref 1–4.8)
MAGNESIUM SPEC-SCNC: 1.5 MG/DL (ref 1.6–2.3)
MCH RBC QN AUTO: 30.7 PG (ref 25–35)
MCHC RBC AUTO-ENTMCNC: 33 G/DL (ref 31–37)
MCV RBC AUTO: 92.9 FL (ref 80–100)
MONOCYTES # BLD MANUAL: 0.35 K/UL (ref 0–1)
NEUTROPHILS NFR BLD MANUAL: 73 %
NEUTS SEG # BLD MANUAL: 4.3 K/UL (ref 1.3–7.7)
PH UR: 5 [PH] (ref 5–8)
PLATELET # BLD AUTO: 37 K/UL (ref 150–450)
POTASSIUM SERPL-SCNC: 3.1 MMOL/L (ref 3.5–5.1)
PROT UR QL: (no result)
SODIUM SERPL-SCNC: 140 MMOL/L (ref 137–145)
SP GR UR: 1.01 (ref 1–1.03)
UROBILINOGEN UR QL STRIP: <2 MG/DL (ref ?–2)
WBC # BLD AUTO: 5.8 K/UL (ref 3.8–10.6)

## 2020-01-15 RX ADMIN — CEFAZOLIN SCH MLS/HR: 330 INJECTION, POWDER, FOR SOLUTION INTRAMUSCULAR; INTRAVENOUS at 06:22

## 2020-01-15 RX ADMIN — MAGNESIUM SULFATE IN DEXTROSE SCH MLS/HR: 10 INJECTION, SOLUTION INTRAVENOUS at 06:03

## 2020-01-15 RX ADMIN — OXYCODONE HYDROCHLORIDE SCH MG: 15 TABLET, FILM COATED, EXTENDED RELEASE ORAL at 20:04

## 2020-01-15 RX ADMIN — METOPROLOL TARTRATE SCH MG: 50 TABLET, FILM COATED ORAL at 17:02

## 2020-01-15 RX ADMIN — LEVOTHYROXINE SODIUM SCH MCG: 88 TABLET ORAL at 06:22

## 2020-01-15 RX ADMIN — POTASSIUM BICARBONATE SCH MEQ: 782 TABLET, EFFERVESCENT ORAL at 08:51

## 2020-01-15 RX ADMIN — METOPROLOL TARTRATE SCH MG: 50 TABLET, FILM COATED ORAL at 08:52

## 2020-01-15 RX ADMIN — NYSTATIN SCH UNIT: 100000 SUSPENSION ORAL at 20:04

## 2020-01-15 RX ADMIN — PANTOPRAZOLE SODIUM SCH MG: 40 TABLET, DELAYED RELEASE ORAL at 06:22

## 2020-01-15 RX ADMIN — HYDROMORPHONE HYDROCHLORIDE PRN MG: 1 INJECTION, SOLUTION INTRAMUSCULAR; INTRAVENOUS; SUBCUTANEOUS at 13:07

## 2020-01-15 RX ADMIN — OXYCODONE HYDROCHLORIDE SCH MG: 15 TABLET, FILM COATED, EXTENDED RELEASE ORAL at 08:53

## 2020-01-15 RX ADMIN — HYDROMORPHONE HYDROCHLORIDE PRN MG: 1 INJECTION, SOLUTION INTRAMUSCULAR; INTRAVENOUS; SUBCUTANEOUS at 17:02

## 2020-01-15 RX ADMIN — POTASSIUM BICARBONATE SCH MEQ: 782 TABLET, EFFERVESCENT ORAL at 06:22

## 2020-01-15 RX ADMIN — DOCUSATE SODIUM PRN MG: 100 CAPSULE, LIQUID FILLED ORAL at 06:33

## 2020-01-15 RX ADMIN — MAGNESIUM SULFATE IN DEXTROSE SCH MLS/HR: 10 INJECTION, SOLUTION INTRAVENOUS at 08:50

## 2020-01-15 RX ADMIN — NYSTATIN SCH UNIT: 100000 SUSPENSION ORAL at 17:03

## 2020-01-15 RX ADMIN — METOPROLOL TARTRATE SCH MG: 50 TABLET, FILM COATED ORAL at 20:04

## 2020-01-15 NOTE — P.PN
Subjective





Patient is seen in follow-up for acute kidney injury.  Renal function is 

worsening.  Creatinine 2.3 today.  Patient has a Fernandez catheter.  She is no

noliguric.  She is receiving IV fluids.  Calcium level is trending down.  It is 

12.9 today.  Patient is confused.





Vital signs are stable.


General: The patient appeared well nourished and normally developed. 


HEENT: Head exam is unremarkable. Neck is without jugular venous distension.


LUNGS: Lungs are clear to auscultation and percussion. Breath sounds decreased.


HEART: Rate and Rhythm are regular. First and second heart sounds normal. No 

murmurs, rubs or gallops. 


ABDOMEN: Abdominal exam reveals normal bowel sounds. Non-tender and non-

distended. No evidence of peritonitis.


EXTREMITITES: No clubbing, cyanosis, or edema.





Objective





- Vital Signs


Vital signs: 


                                   Vital Signs











Temp  98.2 F   01/12/20 04:43


 


Pulse  105 H  01/12/20 04:43


 


Resp  15   01/12/20 04:43


 


BP  95/52   01/12/20 04:43


 


Pulse Ox  95   01/12/20 04:43








                                 Intake & Output











 01/11/20 01/12/20 01/12/20





 18:59 06:59 18:59


 


Intake Total 2170 1560 


 


Output Total 500 550 


 


Balance 1670 1010 


 


Weight 72.5 kg  


 


Intake:   


 


  Intake, IV Titration 1320 1240 





  Amount   


 


    Sodium Chloride 0.9% 1, 1320 1240 





    000 ml @ 100 mls/hr IV .   





    Q10H [facility] Rx#:[id number]   


 


  Oral 850 320 


 


Output:   


 


  Urine 500 550 


 


Other:   


 


  Voiding Method Urinal Indwelling Catheter Indwelling Catheter


 


  # Voids 2 2 














- Labs


CBC & Chem 7: 


                                 01/12/20 07:18





                                 01/12/20 07:18


Labs: 


                  Abnormal Lab Results - Last 24 Hours (Table)











  01/11/20 01/11/20 01/12/20 Range/Units





  09:02 09:02 04:55 


 


WBC  2.9 L    (3.8-10.6)  k/uL


 


RBC     (3.80-5.40)  m/uL


 


Hgb     (11.4-16.0)  gm/dL


 


Hct     (34.0-46.0)  %


 


RDW     (11.5-15.5)  %


 


Plt Count     (150-450)  k/uL


 


Lymphocytes # (Manual)  0.46 L    (1.0-4.8)  k/uL


 


Metamyelocytes # (Man)  0.15 H    (0)  k/uL


 


Myelocytes # (Manual)  0.03 H    (0)  k/uL


 


Nucleated RBCs  76 H    (0-0)  /100 WBC


 


Chloride     ()  mmol/L


 


BUN     (7-17)  mg/dL


 


Creatinine     (0.52-1.04)  mg/dL


 


Calcium     (8.4-10.2)  mg/dL


 


AST     (14-36)  U/L


 


ALT     (4-34)  U/L


 


Alkaline Phosphatase     ()  U/L


 


Total Protein     (6.3-8.2)  g/dL


 


Albumin     (3.5-5.0)  g/dL


 


Lipase     ()  U/L


 


PTH Intact   <2.0 L   (14.0-72.0)  pg/mL


 


Urine Appearance    Cloudy H  (Clear)  


 


Urine Protein    1+ H  (Negative)  


 


Urine Ketones    Trace H  (Negative)  


 


Urine Blood    Moderate H  (Negative)  


 


Urine RBC    10 H  (0-5)  /hpf


 


Amorphous Sediment    Occasional H  (None)  /hpf


 


Urine Bacteria    Rare H  (None)  /hpf


 


Hyaline Casts    3 H  (0-2)  /lpf


 


Urine Mucus    Rare H  (None)  /hpf














  01/12/20 01/12/20 Range/Units





  07:18 07:18 


 


WBC    (3.8-10.6)  k/uL


 


RBC  2.21 L   (3.80-5.40)  m/uL


 


Hgb  7.1 L   (11.4-16.0)  gm/dL


 


Hct  21.6 L   (34.0-46.0)  %


 


RDW  20.0 H   (11.5-15.5)  %


 


Plt Count  44 L   (150-450)  k/uL


 


Lymphocytes # (Manual)    (1.0-4.8)  k/uL


 


Metamyelocytes # (Man)    (0)  k/uL


 


Myelocytes # (Manual)    (0)  k/uL


 


Nucleated RBCs    (0-0)  /100 WBC


 


Chloride   109 H  ()  mmol/L


 


BUN   53 H  (7-17)  mg/dL


 


Creatinine   2.38 H  (0.52-1.04)  mg/dL


 


Calcium   12.9 H  (8.4-10.2)  mg/dL


 


AST   155 H  (14-36)  U/L


 


ALT   75 H  (4-34)  U/L


 


Alkaline Phosphatase   143 H  ()  U/L


 


Total Protein   5.3 L  (6.3-8.2)  g/dL


 


Albumin   2.7 L  (3.5-5.0)  g/dL


 


Lipase   549 H  ()  U/L


 


PTH Intact    (14.0-72.0)  pg/mL


 


Urine Appearance    (Clear)  


 


Urine Protein    (Negative)  


 


Urine Ketones    (Negative)  


 


Urine Blood    (Negative)  


 


Urine RBC    (0-5)  /hpf


 


Amorphous Sediment    (None)  /hpf


 


Urine Bacteria    (None)  /hpf


 


Hyaline Casts    (0-2)  /lpf


 


Urine Mucus    (None)  /hpf














Assessment and Plan


Plan: 





Assessment:


1.  Acute kidney injury secondary to ATN secondary to hypercalcemia, hypotension

and intravascular volume depletion from vomiting.  Renal function worsening.  

Creatinine 2.38 today.  Baseline near 1.


2.  Hypercalcemia secondary to bone metastasis and volume contraction.  

Improving.  PTH appropriately suppressed.  Vitamin D normal.


3.  Breast cancer with bone metastasis.


4.  Pancytopenia secondary to chemotherapy.  Hemoglobin trending down.  No 

active bleeding.





Plan:


Maintain normal saline at 100 mL an hour.


Continue to hold diuretics.


Status post pamidronate on January 10.


Maintain calcitonin 300 units IM twice daily - can likely discontinue tomorrow.


Repeat electrolytes in the morning.


Hold antihypertensives for systolic blood pressure is than 120.
Subjective


Progress Note Date: 01/12/20


This is a 54-year-old  female patient of Valdemar Alvarez NP with past 

medical history of breast cancer status post lumpectomy in 2011 with recurrence 

of invasive ductal carcinoma stage III grade IIIa with lumpectomy January 2014 

with bone metastasis and multiple pathologic compression fractures.  Status post

cervical fusion, right femur nail and titanium david all positive for cancer in 

June 2019, kyphoplasty L3 followed by kyphoplasty T12, L1, L5-S1 with 

radiofrequency ablation October 2019, history of thyroid cancer status post 

lobectomy and radiation therapy, chronic atrial fibrillation status post 

ablation on Xarelto, hypothyroidism, chronic pain syndrome, gastroesophageal 

reflux disease, generalized anxiety disorder.  Patient's last chemotherapy was 

performed on 10/16/2019.  Last radiation to the cervical, right femur and lumbar

areas completed on 07/26/2019.  PET scan on 11/09/2019 revealed osseous 

metastatic disease. Patient has [facility] care in place.  Patient states that

she was at [facility] from October 24 through the November 5 at which 

time she underwent lumbar spine procedures.  She states she also has a left 

scapular fracture and rib fractures.  Old fractures are pathologic related to 

cancer with metastatic disease.  She had previously received all her care at 

[facility] and  now switch to Dr. Carr.   She was last admitted from her

facility November 19 to November 22 secondary to hypercalcemia secondary to 

metastatic disease to the bone, she was seen by Dr. Ramirez that time, she 

received IM calcitonin, and Zometa 4 mg IV, calcium on the last admission was 

14.9, on discharge She'll level was 7.2.





Patient came into [facility] emergency center for evaluation.  

secondary to abdominal pain of 1 week, nausea vomiting of one week, she is was 

found to have a calcium level of 18, creatinine is also worsening, currently at 

1.78 on admission, discharge creatinine was 0.55, November 23.  Lipase was 

elevated at 3253, liver function tests elevated including alkaline [place], 203.  

She has known history of cholecystectomy with sphincterotomy in the past.  She 

does have chronic pain, we are going to continue on the OxyContin 20 mg every 12

hours, IV Dilaudid, consult were made with Dr. Rodriguez gastrology for pancreatitis,

consult with Dr. Desai, and Dr. Rmairez for severe critical hypercalcemia.  We'll 

going to add additional infusion of IV pamidronate, for a total of 90 mg 1 dose

today.  IV hydration, IV Lasix 40 mg twice a day, 





1/12: Per nursing staff patient had increased confusion and competitive through 

the night.  Patient continues to have confusion unable to answer questions 

appropriately.  At times she will make sense and then rambles on and jumps 

subject while interviewing.  Patient is reluctant to go to subacute rehab 

facility she is afraid this will mess with her rehab days for the week.  Patient

is complaining of generalized pain that has increased.  Patient is requesting to

go home and receive therapy at home.  Discussed with patient that she is not 

able to walk or hold cups that she would not be able to stay home to receive 

therapy.  Patient requires more intense therapy.





Review of systems


Constitutional: Reports anorexia, Reports chronic pain, Reports lethargy, 

Reports malaise, Reports poor appetite, Reports weight gain


Ears, nose, mouth and throat: Reports as per HPI, Denies ant. neck pain, Denies 

bleeding gums, Denies dental pain, Denies dysphagia, Denies epistaxis, Denies 

headache, Denies hoarseness, Denies mouth pain, Denies nasal congestion, Denies 

nasal discharge, Denies neck fullness/pressure, Denies neck lump, Denies nose 

pain, Denies odynophagia, Denies post-nasal drip, Denies sinus pain, Denies 

sinus pressure, Denies swelling in mouth, Denies swelling in throat, Denies sore

throat, Denies vertigo, Denies voice changes


Cardiovascular: Reports as per HPI, Denies chest pain, Denies claudication, 

Denies decreased exercise tolerance, Denies dyspnea on exertion, Denies edema, 

Denies high blood pressure, Denies irregular heart beat, Denies leg edema, 

Denies lightheadedness, Denies orthopnea, Denies palpitations, Denies paroxysmal

nocturnal dyspnea, Denies phlebitis, Denies rapid heart beat, Denies shortness 

of breath, Denies syncope


Respiratory: Reports as per HPI, Denies congestion, Denies cough, Denies cough 

with sputum, Denies dyspnea, Denies excessive sputum, Denies hemoptysis, Denies 

home oxygen, Denies pain, Denies pain on inspiration, Denies pleurisy, Denies 

respiratory infections, Denies sleep apnea, Denies snoring, Denies wheezing


Gastrointestinal: Reports as per HPI, Reports abdominal pain, Reports dyspepsia,

Reports early satiety, Reports loss of appetite, Reports nausea, Reports 

vomiting


Genitourinary: Reports as per HPI


Menstruation: Reports as per HPI, Denies amenorrhea, Denies amenorrhea on BC, 

Denies currently menstrual, Denies cycle < 21 days, Denies cycle > 35 days, 

Denies cycle variable, Denies menses 1-7 days, Denies menses 8 or > days, Denies

menses variable, Denies period heavy, Denies period light, Denies period normal,

Denies period spotting, Denies post hysterectomy, Denies postmenopausal, Denies 

premenarcheal


Musculoskeletal: Reports as per HPI, Reports gait dysfunction, Reports 

limitation of motion, Denies arm numbness/tingling, Denies atrophy, Denies 

fractures, Denies frequent falls, Denies hot joints, Denies leg numbness/tin

gling, Denies loss of height, Denies low back pain, Denies morning stiffness, 

Denies muscle cramps, Denies muscle weakness, Denies myalgias, Denies neck pain,

Denies neck stiffness, Denies prior amputations, Denies redness of joints, 

Denies shooting arm pain, Denies shooting leg pain


Integumentary: Reports as per HPI


Neurological: Reports as per HPI, Reports memory loss


Psychiatric: Reports as per HPI, Denies anhedonia, Denies anxiety, Denies 

anxiety attacks, Denies change in appetite, Denies change in libido, Denies 

change in sleep habits, Denies confusion, Denies depression, Denies difficulty 

concentrating, Denies disorientation, Denies hallucinations, Denies 

hopelessness, Denies hypersomnia, Denies insomnia, Denies irritability, Denies 

memory loss, Denies mood swings, Denies paranoia, Denies sadness/tearfulness, 

Denies sleep disturbances, Denies suicidal ideation


Endocrine: Reports as per HPI


Hematologic/Lymphatic: Reports as per HPI


Allergic/Immunologic: Reports as per HPI, Denies allergic rhinitis, Denies 

anaphylaxis, Denies angioedema, Denies gluten intolerance, Denies persistent 

infections, Denies seasonal allergies, Denies urticaria, Denies wheezing








Objective





- Vital Signs


Vital signs: 


                                   Vital Signs











Temp  98.2 F   01/12/20 04:43


 


Pulse  105 H  01/12/20 04:43


 


Resp  15   01/12/20 04:43


 


BP  95/52   01/12/20 04:43


 


Pulse Ox  95   01/12/20 04:43








                                 Intake & Output











 01/11/20 01/12/20 01/12/20





 18:59 06:59 18:59


 


Intake Total 2170 1560 


 


Output Total 500 550 


 


Balance 1670 1010 


 


Weight 72.5 kg  


 


Intake:   


 


  Intake, IV Titration 1320 1240 





  Amount   


 


    Sodium Chloride 0.9% 1, 1320 1240 





    000 ml @ 100 mls/hr IV .   





    Q10H [facility] Rx#:[id number]   


 


  Oral 850 320 


 


Output:   


 


  Urine 500 550 


 


Other:   


 


  Voiding Method Urinal Indwelling Catheter 


 


  # Voids 2 2 














- Exam


General Appearance: Alert, cooperative at times, no distress, appears stated 

age.


Neck HEENT: Supple, no lymphadenopathy, no thyroid enlargement, no carotid 

bruits.


Lungs: Clear to auscultation without crackles or wheezes no rhonchi, no 

deformity.


Chest Wall: Chest wall normal expansion with deep inspiration no tenderness and 

no deformity was found on exam, no costochondral pain or discomfort.


Heart: Regular rate and rhythm, S1, S2 normal, no murmur, rub or gallop.


Back: Symmetric, no curvature, ROM normal, no CVA tenderness.


Abdomen: Soft, non-tender, no rebound or rigidity, no hepatosplenomegaly.


Extremities: Extremities normal, atraumatic, no cyanosis or edema.  Strength 

equal bilaterally


Pulses: 2+ and symmetric.


Skin: Skin color, texture, tugor normal, no rashes or lesions.


Neurologic: Alert oriented x2, noted confusion, rambling cranial nerves II 

through XII intact, no motor deficit, no abnormal balance or gait








- Labs


CBC & Chem 7: 


                                 01/12/20 07:18





                                 01/12/20 07:18


Labs: 


                  Abnormal Lab Results - Last 24 Hours (Table)











  01/11/20 01/11/20 01/11/20 Range/Units





  09:02 09:02 09:02 


 


WBC  2.9 L    (3.8-10.6)  k/uL


 


RBC  2.60 L    (3.80-5.40)  m/uL


 


Hgb  8.2 L D    (11.4-16.0)  gm/dL


 


Hct  25.5 L    (34.0-46.0)  %


 


RDW  20.0 H    (11.5-15.5)  %


 


Plt Count  52 L    (150-450)  k/uL


 


Lymphocytes # (Manual)  0.46 L    (1.0-4.8)  k/uL


 


Metamyelocytes # (Man)  0.15 H    (0)  k/uL


 


Myelocytes # (Manual)  0.03 H    (0)  k/uL


 


Nucleated RBCs  76 H    (0-0)  /100 WBC


 


Chloride     ()  mmol/L


 


BUN   48 H   (7-17)  mg/dL


 


Creatinine   1.87 H   (0.52-1.04)  mg/dL


 


Calcium   15.8 H*   (8.4-10.2)  mg/dL


 


AST   143 H   (14-36)  U/L


 


ALT   68 H   (4-34)  U/L


 


Alkaline Phosphatase   136 H   ()  U/L


 


Total Protein   5.9 L   (6.3-8.2)  g/dL


 


Albumin   3.2 L   (3.5-5.0)  g/dL


 


Lipase   1319 H   ()  U/L


 


PTH Intact    <2.0 L  (14.0-72.0)  pg/mL


 


Urine Appearance     (Clear)  


 


Urine Protein     (Negative)  


 


Urine Ketones     (Negative)  


 


Urine Blood     (Negative)  


 


Urine RBC     (0-5)  /hpf


 


Amorphous Sediment     (None)  /hpf


 


Urine Bacteria     (None)  /hpf


 


Hyaline Casts     (0-2)  /lpf


 


Urine Mucus     (None)  /hpf














  01/12/20 01/12/20 01/12/20 Range/Units





  04:55 07:18 07:18 


 


WBC     (3.8-10.6)  k/uL


 


RBC   2.21 L   (3.80-5.40)  m/uL


 


Hgb   7.1 L   (11.4-16.0)  gm/dL


 


Hct   21.6 L   (34.0-46.0)  %


 


RDW   20.0 H   (11.5-15.5)  %


 


Plt Count   44 L   (150-450)  k/uL


 


Lymphocytes # (Manual)     (1.0-4.8)  k/uL


 


Metamyelocytes # (Man)     (0)  k/uL


 


Myelocytes # (Manual)     (0)  k/uL


 


Nucleated RBCs     (0-0)  /100 WBC


 


Chloride    109 H  ()  mmol/L


 


BUN    53 H  (7-17)  mg/dL


 


Creatinine    2.38 H  (0.52-1.04)  mg/dL


 


Calcium    12.9 H  (8.4-10.2)  mg/dL


 


AST    155 H  (14-36)  U/L


 


ALT    75 H  (4-34)  U/L


 


Alkaline Phosphatase    143 H  ()  U/L


 


Total Protein    5.3 L  (6.3-8.2)  g/dL


 


Albumin    2.7 L  (3.5-5.0)  g/dL


 


Lipase    549 H  ()  U/L


 


PTH Intact     (14.0-72.0)  pg/mL


 


Urine Appearance  Cloudy H    (Clear)  


 


Urine Protein  1+ H    (Negative)  


 


Urine Ketones  Trace H    (Negative)  


 


Urine Blood  Moderate H    (Negative)  


 


Urine RBC  10 H    (0-5)  /hpf


 


Amorphous Sediment  Occasional H    (None)  /hpf


 


Urine Bacteria  Rare H    (None)  /hpf


 


Hyaline Casts  3 H    (0-2)  /lpf


 


Urine Mucus  Rare H    (None)  /hpf














Assessment and Plan


Plan: 


1.  Critical Hypercalcemia secondary to malignancy with metastatic disease to 

the bone.  Nephrology consult appreciated.  IV fluids decreased to 100 mL per 

hour, pamidronate IV, 19 mg 1 dose, discontinue vitamin D.  Electrophoresis 

studies negative.  Appropriately suppressed PTH from previous workup, consult 

with Dr. Ramirez reviewed and appreciated, Dr. Carr.  Discontinue Lasix, 

discontinue potassium supplement, start calcitonin 300 units IM twice a day.  

PTH less than 2





2.  Acute pancreatitis, most likely secondary to hypercalcemia, prior history of

cholecystectomy with sphincterotomy, obtain abdominal ultrasound, which is 

negative for common bile duct outpatient, nothing by mouth to clear liquid diet,

antiemetics,





3.  Pancytopenia with Breast cancer with bone metastasis status post multiple 

surgical interventions.  Oncology consult.





4.  Paroxysmal atrial fibrillation.  Continue Toprol-XL 25 mg in the morning and

50 mg at bedtime, Xarelto 20 mg daily.





5.  Acute renal failure with underlying CTk stage I, secondary to dehydration, A

TN, IV fluids for hydration, patient is on IV Lasix as well as IV fluids for 

severe hypercalcemia, IV Lasix and admitted to be discontinued once calcium is 

stabilized.





6.  Hypothyroidism.  Continue levothyroxine 175 g daily.





5.  Gastroesophageal reflux disease and GI prophylaxis.  Continue Protonix.





7.  Chronic pain syndrome.  Continue Voltaren gel, Robaxin 750 mg every 6 hours 

as needed, OxyContin 30 mg extended release every 12 hours, oxycodone 10 mg 

every 4 hours as needed.





8  Transaminitis, acute, possibly possibly related to ROOT, alkaline phosphatase

elevations also noted most likely secondary to bone  metastases, doubt 

obstructive etiology, cant r/o other itioloty include liver med





9  History of thyroid cancer.





10.  Generalized anxiety disorder.  Continue Xanax or 0.5 mg 4 times daily as 

needed.





11.  Increased confusion possibly metastases to brain.  MRI without contrast o

rdered





DVT prophylaxis on chronic Xarelto dose





GI prophylaxis Protonix





Discharge plan: Return home with [facility] 





Impression and plan of care have been directed as dictated by the signing 

physician.  Jennifer Alonzo nurse practitioner acting as scribe for signing 

physician.
Subjective


Progress Note Date: 01/13/20





This is a 54-year-old  female patient of Valdemar Alvarez NP with past 

medical history of breast cancer status post lumpectomy in 2011 with recurrence 

of invasive ductal carcinoma stage III grade IIIa with lumpectomy January 2014 

with bone metastasis and multiple pathologic compression fractures.  Status post

cervical fusion, right femur nail and titanium david all positive for cancer in 

June 2019, kyphoplasty L3 followed by kyphoplasty T12, L1, L5-S1 with 

radiofrequency ablation October 2019, history of thyroid cancer status post 

lobectomy and radiation therapy, chronic atrial fibrillation status post 

ablation on Xarelto, hypothyroidism, chronic pain syndrome, gastroesophageal 

reflux disease, generalized anxiety disorder.  Patient's last chemotherapy was 

performed on 10/16/2019.  Last radiation to the cervical, right femur and lumbar

areas completed on 07/26/2019.  PET scan on 11/09/2019 revealed osseous 

metastatic disease. Patient has [facility] care in place.  Patient states that

she was at [facility] from October 24 through the November 5 at which 

time she underwent lumbar spine procedures.  She states she also has a left 

scapular fracture and rib fractures.  Old fractures are pathologic related to 

cancer with metastatic disease.  She had previously received all her care at 

[facility] and  now switch to Dr. Carr.   She was last admitted from her

facility November 19 to November 22 secondary to hypercalcemia secondary to 

metastatic disease to the bone, she was seen by Dr. Ramirez that time, she 

received IM calcitonin, and Zometa 4 mg IV, calcium on the last admission was 

14.9, on discharge She'll level was 7.2.





Patient came into [facility] emergency center for evaluation.  

secondary to abdominal pain of 1 week, nausea vomiting of one week, she is was 

found to have a calcium level of 18, creatinine is also worsening, currently at 

1.78 on admission, discharge creatinine was 0.55, November 23.  Lipase was 

elevated at 3253, liver function tests elevated including alkaline [place], 203.  

She has known history of cholecystectomy with sphincterotomy in the past.  She 

does have chronic pain, we are going to continue on the OxyContin 20 mg every 12

hours, IV Dilaudid, consult were made with Dr. Rodriguez gastrology for pancreatitis,

consult with Dr. Desai, and Dr. Ramirez for severe critical hypercalcemia.  We'll 

going to add additional infusion of IV pamidronate, for a total of 90 mg 1 dose

today.  IV hydration, IV Lasix 40 mg twice a day, 





1/12: Per nursing staff patient had increased confusion and competitive through 

the night.  Patient continues to have confusion unable to answer questions 

appropriately.  At times she will make sense and then rambles on and jumps 

subject while interviewing.  Patient is reluctant to go to subacute rehab 

facility she is afraid this will mess with her rehab days for the week.  Patient

is complaining of generalized pain that has increased.  Patient is requesting to

go home and receive therapy at home.  Discussed with patient that she is not 

able to walk or hold cups that she would not be able to stay home to receive 

therapy.  Patient requires more intense therapy.





1/13: At midnight, A-Team was called as patient had a hemoglobin of 6.7 and EKG 

was A. fib with RVR.  Patient reported palpitations but denied any difficulty 

breathing.  Patient was transferred to ICU.  Cardizem drip was ordered but 

patient did not require.  Patient was transfused 1 unit of packed RBCs and 

during transport from [facility] ICU, cardiac rhythm converted to sinus rhythm.  No

anticoagulation per oncology due to low platelet count.  Patient has been seen 

by cardiology with recommendations for metoprolol tartrate 50 mg 3 times daily. 

The patient is eating very little.  She is scheduled for MRI of the brain.  CA 

2729, CEA ordered by oncology is pending.  The plan to resume chemotherapy 

following discharge from the hospital.  Patient has been afebrile, blood 

pressure 137/69, pulse ox 92% on 2 L nasal cannula.  Repeat lab work this 

morning reveals hemoglobin of 9.1, WBC 6.3, platelet count 45, BUN 51 and cre

atinine 2.05.  Ionized calcium 6.7.  , ALT 75, alkaline phosphatase 180.











Review of systems


Constitutional: Reports anorexia, Reports chronic pain, Reports lethargy, 

Reports malaise, Reports poor appetite


Ears, nose, mouth and throat: Denies ant. neck pain, Denies bleeding gums, 

Denies dental pain, Denies dysphagia, Denies epistaxis, Denies headache, Denies 

hoarseness, Denies mouth pain, Denies nasal congestion, Denies nasal discharge, 

Denies neck fullness/pressure, Denies neck lump, Denies nose pain, Denies 

odynophagia, Denies post-nasal drip, Denies sinus pain, Denies sinus pressure, 

Denies swelling in mouth, Denies swelling in throat, Denies sore throat, Denies 

vertigo, Denies voice changes


Cardiovascular:  Denies chest pain, Denies claudication, Denies decreased 

exercise tolerance, Denies dyspnea on exertion, Denies edema, Denies high blood 

pressure, Denies irregular heart beat, Denies leg edema, Denies lightheadedness,

Denies orthopnea, Denies palpitations, Denies paroxysmal nocturnal dyspnea, 

Denies phlebitis, Denies rapid heart beat, Denies shortness of breath, Denies 

syncope


Respiratory: Denies congestion, Denies cough, Denies cough with sputum, Denies 

dyspnea, Denies excessive sputum, Denies hemoptysis, Denies home oxygen, Denies 

pain, Denies pain on inspiration, Denies pleurisy, Denies respiratory 

infections, Denies sleep apnea, Denies snoring, Denies wheezing


Gastrointestinal: Reports abdominal pain, Reports dyspepsia, Reports early 

satiety, Reports loss of appetite, Reports nausea, Reports vomiting


Genitourinary: Denies urinary retention


Musculoskeletal: Reports gait dysfunction, Reports limitation of motion, Denies 

arm numbness/tingling, Denies atrophy, Denies fractures, Denies frequent falls, 

Denies hot joints, Denies leg numbness/tingling, Denies loss of height, Denies 

low back pain, Denies morning stiffness, Denies muscle cramps, Denies muscle 

weakness, Denies myalgias, Denies neck pain, Denies neck stiffness, Denies prior

amputations, Denies redness of joints, Denies shooting arm pain, Denies shooting

leg pain


Integumentary: No rashes, no wounds


Neurological:  Reports memory loss


Psychiatric: Denies anhedonia, Denies anxiety, Denies anxiety attacks, Denies 

change in appetite, Denies change in libido, Denies change in sleep habits, Den

ies confusion, Denies depression, Denies difficulty concentrating, Denies 

disorientation, Denies hallucinations, Denies hopelessness, Denies hypersomnia, 

Denies insomnia, Denies irritability, Denies memory loss, Denies mood swings, 

Denies paranoia, Denies sadness/tearfulness, Denies sleep disturbances, Denies 

suicidal ideation


Allergic/Immunologic:  Denies allergic rhinitis, Denies anaphylaxis, Denies 

angioedema, Denies gluten intolerance, Denies persistent infections, Denies 

seasonal allergies, Denies urticaria, Denies wheezing








Objective





- Vital Signs


Vital signs: 


                                   Vital Signs











Temp  97.8 F   01/13/20 12:00


 


Pulse  95   01/13/20 12:00


 


Resp  16   01/13/20 12:00


 


BP  137/69   01/13/20 12:00


 


Pulse Ox  92 L  01/13/20 12:00








                                 Intake & Output











 01/12/20 01/13/20 01/13/20





 18:59 06:59 18:59


 


Intake Total 1680 910 


 


Output Total 1400 360 


 


Balance 280 550 


 


Weight  76.8 kg 


 


Intake:   


 


  IV  400 


 


    Sodium Chloride 0.9% 1,  400 





    000 ml @ 100 mls/hr IV .   





    Q10H [facility] Rx#:[id number]   


 


  Intake, IV Titration 1200 200 





  Amount   


 


    Sodium Chloride 0.9% 1, 1200 200 





    000 ml @ 100 mls/hr IV .   





    Q10H [facility] Rx#:[id number]   


 


  Oral 480  


 


  Blood Product  310 


 


    Rc As-1  Unit  310 





    [id number]   


 


Output:   


 


  Urine 1400 360 


 


Other:   


 


  Voiding Method Indwelling Catheter Indwelling Catheter Indwelling Catheter


 


  # Voids 1  














- Exam





General Appearance: Alert, cooperative at times, no distress, appears stated 

age.


Neck HEENT: Supple, no lymphadenopathy, no thyroid enlargement, no carotid 

bruits.


Lungs: Clear to auscultation without crackles or wheezes no rhonchi, no 

deformity.


Chest Wall: Chest wall normal expansion with deep inspiration no tenderness and 

no deformity was found on exam, no costochondral pain or discomfort.


Heart: Regular rate and rhythm, S1, S2 normal, no murmur, rub or gallop.


Back: Symmetric, no curvature, ROM normal, no CVA tenderness.


Abdomen: Soft, non-tender, no rebound or rigidity, no hepatosplenomegaly.


Extremities: Extremities normal, atraumatic, no cyanosis or edema.  Strength 

equal bilaterally


Pulses: 2+ and symmetric.


Skin: Skin color, texture, tugor normal, no rashes or lesions.


Neurologic: Alert oriented x2, noted confusion, rambling cranial nerves II 

through XII intact, no motor deficit, generalized weakness





- Labs


CBC & Chem 7: 


                                 01/13/20 04:38





                                 01/13/20 04:38


Labs: 


                  Abnormal Lab Results - Last 24 Hours (Table)











  01/10/20 01/12/20 01/12/20 Range/Units





  20:03 07:18 17:35 


 


WBC   3.5 L  3.4 L  (3.8-10.6)  k/uL


 


RBC    2.17 L  (3.80-5.40)  m/uL


 


Hgb    6.7 L*  (11.4-16.0)  gm/dL


 


Hct    21.1 L  (34.0-46.0)  %


 


RDW    20.0 H  (11.5-15.5)  %


 


Plt Count    40 L  (150-450)  k/uL


 


Lymphocytes # (Manual)   0.56 L  0.61 L  (1.0-4.8)  k/uL


 


Metamyelocytes # (Man)  0.10 H  0.07 H  0.07 H  (0)  k/uL


 


Myelocytes # (Manual)  0.15 H  0.14 H   (0)  k/uL


 


Nucleated RBCs   65 H  46 H  (0-0)  /100 WBC


 


Chloride     ()  mmol/L


 


BUN     (7-17)  mg/dL


 


Creatinine     (0.52-1.04)  mg/dL


 


Glucose     (74-99)  mg/dL


 


POC Glucose (mg/dL)     (75-99)  mg/dL


 


Calcium     (8.4-10.2)  mg/dL


 


Ionized Calcium Tiffany     (4.5-5.3)  mg/dL


 


Ferritin     (10.0-291.0)  ng/mL


 


AST     (14-36)  U/L


 


ALT     (4-34)  U/L


 


Alkaline Phosphatase     ()  U/L


 


Total Protein     (6.3-8.2)  g/dL


 


Albumin     (3.5-5.0)  g/dL


 


Crossmatch     














  01/12/20 01/12/20 01/12/20 Range/Units





  19:29 19:29 23:55 


 


WBC     (3.8-10.6)  k/uL


 


RBC     (3.80-5.40)  m/uL


 


Hgb     (11.4-16.0)  gm/dL


 


Hct     (34.0-46.0)  %


 


RDW     (11.5-15.5)  %


 


Plt Count     (150-450)  k/uL


 


Lymphocytes # (Manual)     (1.0-4.8)  k/uL


 


Metamyelocytes # (Man)     (0)  k/uL


 


Myelocytes # (Manual)     (0)  k/uL


 


Nucleated RBCs     (0-0)  /100 WBC


 


Chloride     ()  mmol/L


 


BUN     (7-17)  mg/dL


 


Creatinine     (0.52-1.04)  mg/dL


 


Glucose     (74-99)  mg/dL


 


POC Glucose (mg/dL)    119 H  (75-99)  mg/dL


 


Calcium     (8.4-10.2)  mg/dL


 


Ionized Calcium Tiffany     (4.5-5.3)  mg/dL


 


Ferritin  3969.3 H    (10.0-291.0)  ng/mL


 


AST     (14-36)  U/L


 


ALT     (4-34)  U/L


 


Alkaline Phosphatase     ()  U/L


 


Total Protein     (6.3-8.2)  g/dL


 


Albumin     (3.5-5.0)  g/dL


 


Crossmatch   See Detail   














  01/13/20 01/13/20 Range/Units





  04:38 04:38 


 


WBC    (3.8-10.6)  k/uL


 


RBC   2.88 L  (3.80-5.40)  m/uL


 


Hgb   9.1 L D  (11.4-16.0)  gm/dL


 


Hct   27.2 L  (34.0-46.0)  %


 


RDW   19.5 H  (11.5-15.5)  %


 


Plt Count   45 L  (150-450)  k/uL


 


Lymphocytes # (Manual)    (1.0-4.8)  k/uL


 


Metamyelocytes # (Man)    (0)  k/uL


 


Myelocytes # (Manual)    (0)  k/uL


 


Nucleated RBCs    (0-0)  /100 WBC


 


Chloride  108 H   ()  mmol/L


 


BUN  51 H   (7-17)  mg/dL


 


Creatinine  2.05 H   (0.52-1.04)  mg/dL


 


Glucose  150 H   (74-99)  mg/dL


 


POC Glucose (mg/dL)    (75-99)  mg/dL


 


Calcium  11.4 H   (8.4-10.2)  mg/dL


 


Ionized Calcium Tiffany  6.7 H*   (4.5-5.3)  mg/dL


 


Ferritin    (10.0-291.0)  ng/mL


 


AST  130 H   (14-36)  U/L


 


ALT  75 H   (4-34)  U/L


 


Alkaline Phosphatase  180 H   ()  U/L


 


Total Protein  6.0 L   (6.3-8.2)  g/dL


 


Albumin  3.1 L   (3.5-5.0)  g/dL


 


Crossmatch    














Assessment and Plan


Plan: 





1.  Critical Hypercalcemia secondary to malignancy with metastatic disease to 

the bone.  Nephrology consult appreciated.  IV fluids 100 mL per hour, 

pamidronate IV, 19 mg 1 dose, discontinue vitamin D.  Electrophoresis studies 

negative.  Appropriately suppressed PTH from previous workup, consult with Dr. Ramirez reviewed and appreciated, oncology consult appreciated.  Discontinue 

Lasix, discontinue potassium supplement, status post calcitonin.





2.  Acute pancreatitis, most likely secondary to hypercalcemia, prior history of

cholecystectomy with sphincterotomy, obtain abdominal ultrasound, which is 

negative for common bile duct outpatient.  Full liquid diet, protein 

supplements.





3.  Pancytopenia with Breast cancer with bone metastasis status post multiple 

surgical interventions.  Oncology consult appreciated.





4.  Paroxysmal atrial fibrillation with episode of A. fib with RVR.  Patient 

didn't not require IV Cardizem.  Continue Lopressor 50 mg 3 times daily, Xarelto

on hold due to low platelet count.  Cardiology consult appreciated.





5.  Acute renal failure secondary to ATN with underlying chronic kidney disease 

stage I, IV fluids for hydration,





6.  Hypothyroidism.  Continue levothyroxine 176 g daily.





5.  Gastroesophageal reflux disease and GI prophylaxis.  Continue Protonix.





7.  Chronic pain syndrome.  Continue Voltaren gel, Robaxin 750 mg every 6 hours 

as needed, OxyContin 30 mg extended release every 12 hours, oxycodone 10 mg 

every 4 hours as needed.





8  Transaminitis, acute, possibly possibly related to ROOT, alkaline phosphatase

elevations also noted most likely secondary to bone  metastases, doubt 

obstructive etiology, cant r/o other itioloty include liver med





9  History of thyroid cancer.





10.  Generalized anxiety disorder.  Continue Xanax or 0.5 mg 4 times daily as 

needed.





11.  Increased confusion possibly metastases to brain.  MRI without contrast 

ordered





12.  Acute anemia secondary to underlying breast cancer and chemotherapy status 

post transfusion of 1 unit of RBCs.





DVT prophylaxis 





GI prophylaxis Protonix





Discharge plan: Return home with [facility] 





Impression and plan of care have been directed as dictated by the signing 

physician.  Kadi Mulligan nurse practitioner acting as scribe for signing 

physician.
Subjective


Progress Note Date: 01/13/20


Principal diagnosis: 





Acute uncomplicated pancreatitis





Patient seen lying in bed, still reporting some abdominal pain but overall 

improved. No nausea or vomiting. Tolerating her diet.





Objective





- Vital Signs


Vital signs: 


                                   Vital Signs











Temp  97.5 F L  01/13/20 20:00


 


Pulse  94   01/13/20 20:00


 


Resp  17   01/13/20 20:00


 


BP  139/74   01/13/20 20:00


 


Pulse Ox  95   01/13/20 20:00








                                 Intake & Output











 01/13/20 01/13/20 01/14/20





 06:59 18:59 06:59


 


Intake Total 910 1440 


 


Output Total 360 450 


 


Balance 550 990 


 


Weight 76.8 kg  


 


Intake:   


 


   1200 


 


    Sodium Chloride 0.9% 1, 400 1200 





    000 ml @ 100 mls/hr IV .   





    Q10H [facility] Rx#:[id number]   


 


  Intake, IV Titration 200  





  Amount   


 


    Sodium Chloride 0.9% 1, 200  





    000 ml @ 100 mls/hr IV .   





    Q10H [facility] Rx#:[id number]   


 


  Oral  240 


 


  Blood Product 310  


 


    Rc As-1  Unit 310  





    [id number]   


 


Output:   


 


  Urine 360 450 


 


Other:   


 


  Voiding Method Indwelling Catheter Indwelling Catheter Indwelling Catheter














- Labs


CBC & Chem 7: 


                                 01/13/20 04:38





                                 01/13/20 04:38


Labs: 


                  Abnormal Lab Results - Last 24 Hours (Table)











  01/10/20 01/12/20 01/12/20 Range/Units





  20:03 19:29 19:29 


 


RBC     (3.80-5.40)  m/uL


 


Hgb     (11.4-16.0)  gm/dL


 


Hct     (34.0-46.0)  %


 


RDW     (11.5-15.5)  %


 


Plt Count     (150-450)  k/uL


 


Metamyelocytes # (Man)  0.10 H    (0)  k/uL


 


Myelocytes # (Manual)  0.15 H    (0)  k/uL


 


Chloride     ()  mmol/L


 


BUN     (7-17)  mg/dL


 


Creatinine     (0.52-1.04)  mg/dL


 


Glucose     (74-99)  mg/dL


 


POC Glucose (mg/dL)     (75-99)  mg/dL


 


Calcium     (8.4-10.2)  mg/dL


 


Ionized Calcium Tiffany     (4.5-5.3)  mg/dL


 


Ferritin   3969.3 H   (10.0-291.0)  ng/mL


 


AST     (14-36)  U/L


 


ALT     (4-34)  U/L


 


Alkaline Phosphatase     ()  U/L


 


Total Protein     (6.3-8.2)  g/dL


 


Albumin     (3.5-5.0)  g/dL


 


Crossmatch    See Detail  














  01/12/20 01/13/20 01/13/20 Range/Units





  23:55 04:38 04:38 


 


RBC    2.88 L  (3.80-5.40)  m/uL


 


Hgb    9.1 L D  (11.4-16.0)  gm/dL


 


Hct    27.2 L  (34.0-46.0)  %


 


RDW    19.5 H  (11.5-15.5)  %


 


Plt Count    45 L  (150-450)  k/uL


 


Metamyelocytes # (Man)     (0)  k/uL


 


Myelocytes # (Manual)     (0)  k/uL


 


Chloride   108 H   ()  mmol/L


 


BUN   51 H   (7-17)  mg/dL


 


Creatinine   2.05 H   (0.52-1.04)  mg/dL


 


Glucose   150 H   (74-99)  mg/dL


 


POC Glucose (mg/dL)  119 H    (75-99)  mg/dL


 


Calcium   11.4 H   (8.4-10.2)  mg/dL


 


Ionized Calcium Tiffany   6.7 H*   (4.5-5.3)  mg/dL


 


Ferritin     (10.0-291.0)  ng/mL


 


AST   130 H   (14-36)  U/L


 


ALT   75 H   (4-34)  U/L


 


Alkaline Phosphatase   180 H   ()  U/L


 


Total Protein   6.0 L   (6.3-8.2)  g/dL


 


Albumin   3.1 L   (3.5-5.0)  g/dL


 


Crossmatch     














Assessment and Plan


(1) Pancreatitis


Narrative/Plan: 


Acute uncomplicated pancreatitis likely secondary to hypercalcemia in the 

setting of metastatic breast cancer.  Currently abdominal pain and labs 

improving.  Patient tolerating her diet.


Current Visit: Yes   Status: Acute   Code(s): K85.90 - ACUTE PANCREATITIS 

WITHOUT NECROSIS OR INFECTION, UNSP   SNOMED Code(s): 36619303


   





(2) Hypercalcemia


Current Visit: Yes   Status: Acute   Priority: High   Code(s): E83.52 - 

HYPERCALCEMIA   SNOMED Code(s): 27761077


   





(3) Metastatic breast cancer


Current Visit: Yes   Status: Chronic   Priority: High   Code(s): C50.919 - 

MALIGNANT NEOPLASM OF UNSP SITE OF UNSPECIFIED FEMALE BREAST   SNOMED Code(s): 

785129729


   


Plan: 





Okay for diet


Continue supportive care


Continue fluid hydration


Continue correction of electrolytes


Other medical management per primary team and oncology service


thank you for allowing us to participate in , the GI service will see him 

by,please call us with any questions or concerns
Subjective


Progress Note Date: 01/13/20


Principal diagnosis: 





MBC, hypercalcemia of malignancy





In f/u today pt continues to be confused, family is at the bedside, she denied 

ALEX, stated she could feel palpitations despite being in SR on the monitor, no 

nausea, mild abd discomfort, no recent vomiting or diarrhea.  





Objective





- Vital Signs


Vital signs: 


                                   Vital Signs











Temp  97.8 F   01/13/20 08:00


 


Pulse  93   01/13/20 10:00


 


Resp  18   01/13/20 10:00


 


BP  143/93   01/13/20 10:00


 


Pulse Ox  95   01/13/20 10:00








                                 Intake & Output











 01/12/20 01/13/20 01/13/20





 18:59 06:59 18:59


 


Intake Total 1680 910 


 


Output Total 1400 360 


 


Balance 280 550 


 


Weight  76.8 kg 


 


Intake:   


 


  IV  400 


 


    Sodium Chloride 0.9% 1,  400 





    000 ml @ 100 mls/hr IV .   





    Q10H [facility] Rx#:[id number]   


 


  Intake, IV Titration 1200 200 





  Amount   


 


    Sodium Chloride 0.9% 1, 1200 200 





    000 ml @ 100 mls/hr IV .   





    Q10H [facility] Rx#:[id number]   


 


  Oral 480  


 


  Blood Product  310 


 


    Rc As-1  Unit  310 





    [id number]   


 


Output:   


 


  Urine 1400 360 


 


Other:   


 


  Voiding Method Indwelling Catheter Indwelling Catheter Indwelling Catheter


 


  # Voids 1  














- Constitutional


General appearance: Present: mild distress, obese





- EENT


Eyes: Present: anicteric sclerae, EOMI, poor dentition


ENT: Present: hearing grossly normal, normal oropharynx





- Respiratory


Respiratory: bilateral: CTA, diminished (increased RR)





- Cardiovascular


Rhythm: regular


Heart sounds: normal: S1, S2


Abnormal Heart Sounds: Absent: systolic murmur, diastolic murmur, rub, S3 

Gallop, S4 Gallop, click, other





- Peripheral edema


  ** leg


Peripheral Edema: bilateral: None





- Gastrointestinal


General gastrointestinal: Present: normal bowel sounds, soft, tenderness 

(generalized)





- Integumentary


Integumentary: Present: normal





- Neurologic


Neurologic: Present: CNII-XII intact





- Musculoskeletal


Musculoskeletal: Present: generalized weakness





- Psychiatric


Psychiatric Comment(s): 





Pt is lucid at times


Psychiatric: Present: A&O x's 3





- Labs


CBC & Chem 7: 


                                 01/13/20 04:38





                                 01/13/20 04:38


Labs: 


                  Abnormal Lab Results - Last 24 Hours (Table)











  01/10/20 01/12/20 01/12/20 Range/Units





  20:03 07:18 17:35 


 


WBC   3.5 L  3.4 L  (3.8-10.6)  k/uL


 


RBC    2.17 L  (3.80-5.40)  m/uL


 


Hgb    6.7 L*  (11.4-16.0)  gm/dL


 


Hct    21.1 L  (34.0-46.0)  %


 


RDW    20.0 H  (11.5-15.5)  %


 


Plt Count    40 L  (150-450)  k/uL


 


Lymphocytes # (Manual)   0.56 L  0.61 L  (1.0-4.8)  k/uL


 


Metamyelocytes # (Man)  0.10 H  0.07 H  0.07 H  (0)  k/uL


 


Myelocytes # (Manual)  0.15 H  0.14 H   (0)  k/uL


 


Nucleated RBCs   65 H  46 H  (0-0)  /100 WBC


 


Chloride     ()  mmol/L


 


BUN     (7-17)  mg/dL


 


Creatinine     (0.52-1.04)  mg/dL


 


Glucose     (74-99)  mg/dL


 


POC Glucose (mg/dL)     (75-99)  mg/dL


 


Calcium     (8.4-10.2)  mg/dL


 


Ionized Calcium Tiffany     (4.5-5.3)  mg/dL


 


Ferritin     (10.0-291.0)  ng/mL


 


AST     (14-36)  U/L


 


ALT     (4-34)  U/L


 


Alkaline Phosphatase     ()  U/L


 


Total Protein     (6.3-8.2)  g/dL


 


Albumin     (3.5-5.0)  g/dL


 


Crossmatch     














  01/12/20 01/12/20 01/12/20 Range/Units





  19:29 19:29 23:55 


 


WBC     (3.8-10.6)  k/uL


 


RBC     (3.80-5.40)  m/uL


 


Hgb     (11.4-16.0)  gm/dL


 


Hct     (34.0-46.0)  %


 


RDW     (11.5-15.5)  %


 


Plt Count     (150-450)  k/uL


 


Lymphocytes # (Manual)     (1.0-4.8)  k/uL


 


Metamyelocytes # (Man)     (0)  k/uL


 


Myelocytes # (Manual)     (0)  k/uL


 


Nucleated RBCs     (0-0)  /100 WBC


 


Chloride     ()  mmol/L


 


BUN     (7-17)  mg/dL


 


Creatinine     (0.52-1.04)  mg/dL


 


Glucose     (74-99)  mg/dL


 


POC Glucose (mg/dL)    119 H  (75-99)  mg/dL


 


Calcium     (8.4-10.2)  mg/dL


 


Ionized Calcium Tiffany     (4.5-5.3)  mg/dL


 


Ferritin  3969.3 H    (10.0-291.0)  ng/mL


 


AST     (14-36)  U/L


 


ALT     (4-34)  U/L


 


Alkaline Phosphatase     ()  U/L


 


Total Protein     (6.3-8.2)  g/dL


 


Albumin     (3.5-5.0)  g/dL


 


Crossmatch   See Detail   














  01/13/20 01/13/20 Range/Units





  04:38 04:38 


 


WBC    (3.8-10.6)  k/uL


 


RBC   2.88 L  (3.80-5.40)  m/uL


 


Hgb   9.1 L D  (11.4-16.0)  gm/dL


 


Hct   27.2 L  (34.0-46.0)  %


 


RDW   19.5 H  (11.5-15.5)  %


 


Plt Count   45 L  (150-450)  k/uL


 


Lymphocytes # (Manual)    (1.0-4.8)  k/uL


 


Metamyelocytes # (Man)    (0)  k/uL


 


Myelocytes # (Manual)    (0)  k/uL


 


Nucleated RBCs    (0-0)  /100 WBC


 


Chloride  108 H   ()  mmol/L


 


BUN  51 H   (7-17)  mg/dL


 


Creatinine  2.05 H   (0.52-1.04)  mg/dL


 


Glucose  150 H   (74-99)  mg/dL


 


POC Glucose (mg/dL)    (75-99)  mg/dL


 


Calcium  11.4 H   (8.4-10.2)  mg/dL


 


Ionized Calcium Tiffany  6.7 H*   (4.5-5.3)  mg/dL


 


Ferritin    (10.0-291.0)  ng/mL


 


AST  130 H   (14-36)  U/L


 


ALT  75 H   (4-34)  U/L


 


Alkaline Phosphatase  180 H   ()  U/L


 


Total Protein  6.0 L   (6.3-8.2)  g/dL


 


Albumin  3.1 L   (3.5-5.0)  g/dL


 


Crossmatch    














Assessment and Plan


(1) Hypercalcemia


Narrative/Plan: 


Pt has had several instances of hypercalcemia of malignancy.  Calcitonin was 

given, gentle hydration.  





CXR is describing significant judi changes, will get results to Dr. Carr.  

Tumor markers ordered, pending MRI brain.  


Current Visit: Yes   Status: Acute   Priority: High   Code(s): E83.52 - 

HYPERCALCEMIA   SNOMED Code(s): 81103947


   





(2) Bicytopenia


Narrative/Plan: 


Secondary to treatment of breast cancer and marrow involvement with cancer





Transfuse to keep Hgb 7 or higher, plt >10,000 unless symptomatic.  WBC/ANC 

adequate at this time


Current Visit: Yes   Status: Acute   Priority: High   Code(s): D75.89 - OTHER 

SPECIFIED DISEASES OF BLOOD AND BLOOD-FORMING ORGANS   SNOMED Code(s): 41559100


   





(3) Metastatic breast cancer


Narrative/Plan: 


Mets to bone and bone marrow.  


Treatment with Ibrance/ faslodex and xgeva.  Was due to start cycle on 1/2 but 

this was delayed due to pt c/o and significant hematological changes. Counts are

currently stable.  Plan to resume treatment after recovery from current 

situation


Current Visit: Yes   Status: Chronic   Priority: High   Code(s): C50.919 - 

MALIGNANT NEOPLASM OF UNSP SITE OF UNSPECIFIED FEMALE BREAST   SNOMED Code(s): 

371486671


   





(4) A-fib


Narrative/Plan: 


NSR on monitor at time of exam.


Hold anticoagulation due to low plt count





Current Visit: Yes   Status: Chronic   Priority: Medium   Code(s): I48.91 - 

UNSPECIFIED ATRIAL FIBRILLATION   SNOMED Code(s): 69456330
Subjective


Progress Note Date: 01/14/20





This is a 54-year-old  female patient of Valdemar Alvarez NP with past 

medical history of breast cancer status post lumpectomy in 2011 with recurrence 

of invasive ductal carcinoma stage III grade IIIa with lumpectomy January 2014 

with bone metastasis and multiple pathologic compression fractures.  Status post

cervical fusion, right femur nail and titanium david all positive for cancer in 

June 2019, kyphoplasty L3 followed by kyphoplasty T12, L1, L5-S1 with 

radiofrequency ablation October 2019, history of thyroid cancer status post 

lobectomy and radiation therapy, chronic atrial fibrillation status post 

ablation on Xarelto, hypothyroidism, chronic pain syndrome, gastroesophageal 

reflux disease, generalized anxiety disorder.  Patient's last chemotherapy was 

performed on 10/16/2019.  Last radiation to the cervical, right femur and lumbar

areas completed on 07/26/2019.  PET scan on 11/09/2019 revealed osseous 

metastatic disease. Patient has [facility] care in place.  Patient states that

she was at [facility] from October 24 through the November 5 at which 

time she underwent lumbar spine procedures.  She states she also has a left 

scapular fracture and rib fractures.  Old fractures are pathologic related to 

cancer with metastatic disease.  She had previously received all her care at 

[facility] and  now switch to Dr. Carr.   She was last admitted from her

facility November 19 to November 22 secondary to hypercalcemia secondary to 

metastatic disease to the bone, she was seen by Dr. Ramirez that time, she 

received IM calcitonin, and Zometa 4 mg IV, calcium on the last admission was 

14.9, on discharge She'll level was 7.2.





Patient came into [facility] emergency center for evaluation.  

secondary to abdominal pain of 1 week, nausea vomiting of one week, she is was 

found to have a calcium level of 18, creatinine is also worsening, currently at 

1.78 on admission, discharge creatinine was 0.55, November 23.  Lipase was 

elevated at 3253, liver function tests elevated including alkaline Ashley, 203.  

She has known history of cholecystectomy with sphincterotomy in the past.  She 

does have chronic pain, we are going to continue on the OxyContin 20 mg every 12

hours, IV Dilaudid, consult were made with Dr. Rodriguez gastrology for pancreatitis,

consult with Dr. Desai, and Dr. Ramirez for severe critical hypercalcemia.  We'll 

going to add additional infusion of IV pamidronate, for a total of 90 mg 1 dose

today.  IV hydration, IV Lasix 40 mg twice a day, 





1/12: Per nursing staff patient had increased confusion and competitive through 

the night.  Patient continues to have confusion unable to answer questions 

appropriately.  At times she will make sense and then rambles on and jumps 

subject while interviewing.  Patient is reluctant to go to subacute rehab 

facility she is afraid this will mess with her rehab days for the week.  Patient

is complaining of generalized pain that has increased.  Patient is requesting to

go home and receive therapy at home.  Discussed with patient that she is not 

able to walk or hold cups that she would not be able to stay home to receive 

therapy.  Patient requires more intense therapy.





1/13: At midnight, A-Team was called as patient had a hemoglobin of 6.7 and EKG 

was A. fib with RVR.  Patient reported palpitations but denied any difficulty 

breathing.  Patient was transferred to ICU.  Cardizem drip was ordered but 

patient did not require.  Patient was transfused 1 unit of packed RBCs and 

during transport from [facility] ICU, cardiac rhythm converted to sinus rhythm.  No

anticoagulation per oncology due to low platelet count.  Patient has been seen 

by cardiology with recommendations for metoprolol tartrate 50 mg 3 times daily. 

The patient is eating very little.  She is scheduled for MRI of the brain.  CA 

2729, CEA ordered by oncology is pending.  The plan to resume chemotherapy 

following discharge from the hospital.  Patient has been afebrile, blood 

pressure 137/69, pulse ox 92% on 2 L nasal cannula.  Repeat lab work this 

morning reveals hemoglobin of 9.1, WBC 6.3, platelet count 45, BUN 51 and cre

atinine 2.05.  Ionized calcium 6.7.  , ALT 75, alkaline phosphatase 180.





1/14: MRI of the brain revealed diffuse meningeal enhancement suggestive of 

metastatic disease.  No evidence of intra-axial metastatic disease.  Extensive 

calvarial enhancing lesions consistent with osseous metastatic disease.  At this

time, patient is to decide whether she wants to proceed with open PE at which 

she would require treatment at [facility] and appears to not be a 

candidate for this.  Patient family state that she would be unable to be 

transported down there.  Other option is for hospice care.  Patient wishes to 

discuss with her son.  Other family members including  are at the bed

side.  IV fluids have been decreased to 60 mL/h per Dr. Bah.  Cardiology 

recommends continuing current medications and will follow on an as-needed basis.

 Dr. Cleary has cleared the patient for diet and has signed off.  Patient has 

been afebrile, heart rate 83, blood pressure 165/91, respiratory rate 26, pulse 

ox 94% on 2 L nasal cannula.  Potassium 3.0, chloride 109, BUN 51, creatinine 

1.54, hemoglobin 8.5, platelet count 38, INR 1.3, PTT 21.4, fibrinogen 557.  

Dulcolax studies are elevated.











Review of systems


Constitutional: Reports anorexia, Reports chronic pain, Reports lethargy, Repo

rts malaise, Reports poor appetite


Ears, nose, mouth and throat: Denies ant. neck pain, Denies bleeding gums, 

Denies dental pain, Denies dysphagia, Denies epistaxis, Denies headache, Denies 

hoarseness, Denies mouth pain, Denies nasal congestion, Denies nasal discharge, 

Denies neck fullness/pressure, Denies neck lump, Denies nose pain, Denies 

odynophagia, Denies post-nasal drip, Denies sinus pain, Denies sinus pressure, 

Denies swelling in mouth, Denies swelling in throat, Denies sore throat, Denies 

vertigo, Denies voice changes


Cardiovascular:  Denies chest pain, Denies claudication, Denies decreased 

exercise tolerance, Denies dyspnea on exertion, Denies edema, Denies high blood 

pressure, Denies irregular heart beat, Denies leg edema, Denies lightheadedness,

Denies orthopnea, Denies palpitations, Denies paroxysmal nocturnal dyspnea, 

Denies phlebitis, Denies rapid heart beat, Denies shortness of breath, Denies 

syncope


Respiratory: Denies congestion, Denies cough, Denies cough with sputum, Denies 

dyspnea, Denies excessive sputum, Denies hemoptysis, Denies home oxygen, Denies 

pain, Denies pain on inspiration, Denies pleurisy, Denies respiratory 

infections, Denies sleep apnea, Denies snoring, Denies wheezing


Gastrointestinal: Reports abdominal pain, Reports dyspepsia, Reports early sa

tiety, Reports loss of appetite, Reports nausea, Reports vomiting


Genitourinary: Denies urinary retention


Musculoskeletal: Reports gait dysfunction, Reports limitation of motion, Denies 

arm numbness/tingling, Denies atrophy, Denies fractures, Denies frequent falls, 

Denies hot joints, Denies leg numbness/tingling, Denies loss of height, Denies 

low back pain, Denies morning stiffness, Denies muscle cramps, Denies muscle 

weakness, Denies myalgias, Denies neck pain, Denies neck stiffness, Denies prior

amputations, Denies redness of joints, Denies shooting arm pain, Denies shooting

leg pain


Integumentary: No rashes, no wounds


Neurological:  Reports memory loss, reports generalized weakness


Psychiatric: Denies anhedonia, Denies anxiety, Denies anxiety attacks, Denies 

change in appetite, Denies change in libido, Denies change in sleep habits, 

Denies confusion, Denies depression, Denies difficulty concentrating, Denies 

disorientation, Denies hallucinations, Denies hopelessness, Denies hypersomnia, 

Denies insomnia, Denies irritability, Denies memory loss, Denies mood swings, 

Denies paranoia, Denies sadness/tearfulness, Denies sleep disturbances, Denies 

suicidal ideation


Allergic/Immunologic:  Denies allergic rhinitis, Denies anaphylaxis, Denies 

angioedema, Denies gluten intolerance, Denies persistent infections, Denies 

seasonal allergies, Denies urticaria, Denies wheezing








Objective





- Vital Signs


Vital signs: 


                                   Vital Signs











Temp  97.5 F L  01/14/20 08:00


 


Pulse  83   01/14/20 09:00


 


Resp  26 H  01/14/20 09:00


 


BP  165/91   01/14/20 09:00


 


Pulse Ox  94 L  01/14/20 10:06








                                 Intake & Output











 01/13/20 01/14/20 01/14/20





 18:59 06:59 18:59


 


Intake Total 1440 1600 


 


Output Total 450 540 


 


Balance 990 1060 


 


Weight  80.9 kg 80.9 kg


 


Intake:   


 


  IV 1200 1600 


 


    Sodium Chloride 0.9% 1, 1200 1600 





    000 ml @ 100 mls/hr IV .   





    Q10H [facility] Rx#:[id number]   


 


  Oral 240  


 


Output:   


 


  Urine 450 540 


 


Other:   


 


  Voiding Method Indwelling Catheter Indwelling Catheter Indwelling Catheter














- Exam





General Appearance: Alert, cooperative at times, no distress, appears stated 

age.  , sister-in-law at bedside.


Neck HEENT: Supple, no lymphadenopathy, no thyroid enlargement, no carotid 

bruits.


Lungs: Clear to auscultation without crackles or wheezes no rhonchi, no 

deformity.


Chest Wall: Chest wall normal expansion with deep inspiration.


Heart: Regular rate and rhythm, S1, S2 normal, no murmur, rub or gallop.


Back: Symmetric, no curvature, ROM normal, no CVA tenderness.


Abdomen: Soft, non-tender, no rebound or rigidity, no hepatosplenomegaly.


Extremities: Extremities normal, atraumatic, no cyanosis or edema.  Strength 

equal bilaterally


Pulses: 2+ and symmetric.


Skin: Skin color, texture, tugor normal, no rashes or lesions.


Neurologic: Alert oriented x2, noted confusion, rambling cranial nerves II 

through XII intact, no motor deficit, generalized weakness





- Labs


CBC & Chem 7: 


                                 01/14/20 04:43





                                 01/14/20 04:43


Labs: 


                  Abnormal Lab Results - Last 24 Hours (Table)











  01/13/20 01/13/20 01/14/20 Range/Units





  04:38 04:38 04:43 


 


RBC    2.79 L  (3.80-5.40)  m/uL


 


Hgb    8.5 L  (11.4-16.0)  gm/dL


 


Hct    25.9 L  (34.0-46.0)  %


 


RDW    19.6 H  (11.5-15.5)  %


 


Plt Count    38 L  (150-450)  k/uL


 


PT     (9.0-12.0)  sec


 


INR     (<1.2)  


 


APTT     (22.0-30.0)  sec


 


Fibrinogen     (200-500)  mg/dL


 


Potassium     (3.5-5.1)  mmol/L


 


Chloride     ()  mmol/L


 


BUN     (7-17)  mg/dL


 


Creatinine     (0.52-1.04)  mg/dL


 


Glucose     (74-99)  mg/dL


 


Ionized Calcium Tiffany     (4.5-5.3)  mg/dL


 


CA 15-3 Antigen  584.7 H    (0.0-32.3)  U/mL


 


CA 27-29   626.0 H   (0.0-38.5)  U/mL














  01/14/20 01/14/20 Range/Units





  04:43 09:53 


 


RBC    (3.80-5.40)  m/uL


 


Hgb    (11.4-16.0)  gm/dL


 


Hct    (34.0-46.0)  %


 


RDW    (11.5-15.5)  %


 


Plt Count    (150-450)  k/uL


 


PT   13.0 H  (9.0-12.0)  sec


 


INR   1.3 H  (<1.2)  


 


APTT   21.4 L  (22.0-30.0)  sec


 


Fibrinogen   557 H  (200-500)  mg/dL


 


Potassium  3.0 L   (3.5-5.1)  mmol/L


 


Chloride  109 H   ()  mmol/L


 


BUN  51 H   (7-17)  mg/dL


 


Creatinine  1.54 H   (0.52-1.04)  mg/dL


 


Glucose  124 H   (74-99)  mg/dL


 


Ionized Calcium Tiffany  5.8 H   (4.5-5.3)  mg/dL


 


CA 15-3 Antigen    (0.0-32.3)  U/mL


 


CA 27-29    (0.0-38.5)  U/mL














Assessment and Plan


Plan: 





1.  Critical Hypercalcemia secondary to breast cancer with metastatic disease to

the bone.  Nephrology consult appreciated.  IV fluids 100 mL per hour, 

pamidronate IV, 19 mg 1 dose, discontinue vitamin D.  Electrophoresis studies 

negative.  Appropriately suppressed PTH from previous workup, consult with Dr. Ramirez reviewed and appreciated, oncology consult appreciated.  Discontinue 

Lasix, discontinue potassium supplement, status post calcitonin.  





2.  Breast cancer with metastatic disease to the bone, brain.  Patient has been 

given the option of LP puncture for further evaluation but understanding that 

she would have to seek treatment at [facility].  This does not sound

like an option for her.  She is contemplating hospice care at this point.





3.  Acute pancreatitis, most likely secondary to hypercalcemia, prior history of

cholecystectomy with sphincterotomy, obtain abdominal ultrasound, which is 

negative for common bile duct outpatient.  Full liquid diet, protein 

supplements.





4.  Pancytopenia with Breast cancer with bone metastasis status post multiple 

surgical interventions.  Oncology consult appreciated.





5.  Paroxysmal atrial fibrillation with episode of A. fib with RVR.  Patient 

didn't not require IV Cardizem.  Continue Lopressor 50 mg 3 times daily, Xarelto

on hold due to low platelet count.  Cardiology consult appreciated.





6.  Acute renal failure secondary to ATN with underlying chronic kidney disease 

stage I, IV fluids for hydration,





7.  Hypothyroidism.  Continue levothyroxine 176 g daily.





8.  Gastroesophageal reflux disease and GI prophylaxis.  Continue Protonix.





9.  Chronic pain syndrome.  Continue Voltaren gel, Robaxin 750 mg every 6 hours 

as needed, OxyContin 30 mg extended release every 12 hours, oxycodone 10 mg 

every 4 hours as needed.





10  Transaminitis, acute, possibly possibly related to ROOT, alkaline 

phosphatase elevations also noted most likely secondary to bone  metastases, 

doubt obstructive etiology, cant r/o other itioloty include liver med





11.  History of thyroid cancer.





12.  Generalized anxiety disorder.  Continue Xanax or 0.5 mg 4 times daily as 

needed.





13.  Increased confusion possibly metastases to brain.  MRI without contrast 

ordered





14.  Acute anemia secondary to underlying breast cancer and chemotherapy status 

post transfusion of 1 unit of RBCs.





DVT prophylaxis 





GI prophylaxis Protonix





Discharge plan: Most likely patient will decide on hospice care





Impression and plan of care have been directed as dictated by the signing 

physician.  Kadi Mulligan nurse practitioner acting as scribe for signing 

physician.
Subjective


Progress Note Date: 01/14/20


Principal diagnosis: 





MBC, hypercalcemia of malignancy





In f/u today pt confusion is less then yesterday but, at times she can be 

scattered in her thoughts.  Spoke with son, family member at bedside.  Pt denied

headache, dizziness, she feels very full, gas, no SOB.





Objective





- Vital Signs


Vital signs: 


                                   Vital Signs











Temp  97.4 F L  01/14/20 12:00


 


Pulse  85   01/14/20 14:00


 


Resp  20   01/14/20 14:00


 


BP  153/87   01/14/20 14:00


 


Pulse Ox  96   01/14/20 14:00








                                 Intake & Output











 01/13/20 01/14/20 01/14/20





 18:59 06:59 18:59


 


Intake Total 1440 1600 840


 


Output Total 450 540 250


 


Balance 990 1060 590


 


Weight  80.9 kg 80.9 kg


 


Intake:   


 


  IV 1200 1600 600


 


    Sodium Chloride 0.9% 1, 1200 1600 600





    000 ml @ 60 mls/hr IV .   





    [id number] [facility] Rx#:[id number]   


 


  Oral 240  240


 


Output:   


 


  Urine 450 540 250


 


Other:   


 


  Voiding Method Indwelling Catheter Indwelling Catheter Indwelling Catheter














- Exam





WN, frail, mild distress, alert and oriented to self, place, time and situation,

she is expectantly anxious about MRI brain results, respirations are increased, 

mild trembling noted in extremities, anasarca 





- Labs


CBC & Chem 7: 


                                 01/14/20 04:43





                                 01/14/20 04:43


Labs: 


                  Abnormal Lab Results - Last 24 Hours (Table)











  01/13/20 01/13/20 01/14/20 Range/Units





  04:38 04:38 04:43 


 


RBC    2.79 L  (3.80-5.40)  m/uL


 


Hgb    8.5 L  (11.4-16.0)  gm/dL


 


Hct    25.9 L  (34.0-46.0)  %


 


RDW    19.6 H  (11.5-15.5)  %


 


Plt Count    38 L  (150-450)  k/uL


 


PT     (9.0-12.0)  sec


 


INR     (<1.2)  


 


APTT     (22.0-30.0)  sec


 


Fibrinogen     (200-500)  mg/dL


 


Potassium     (3.5-5.1)  mmol/L


 


Chloride     ()  mmol/L


 


BUN     (7-17)  mg/dL


 


Creatinine     (0.52-1.04)  mg/dL


 


Glucose     (74-99)  mg/dL


 


Ionized Calcium Tiffany     (4.5-5.3)  mg/dL


 


CA 15-3 Antigen  584.7 H    (0.0-32.3)  U/mL


 


CA 27-29   626.0 H   (0.0-38.5)  U/mL














  01/14/20 01/14/20 Range/Units





  04:43 09:53 


 


RBC    (3.80-5.40)  m/uL


 


Hgb    (11.4-16.0)  gm/dL


 


Hct    (34.0-46.0)  %


 


RDW    (11.5-15.5)  %


 


Plt Count    (150-450)  k/uL


 


PT   13.0 H  (9.0-12.0)  sec


 


INR   1.3 H  (<1.2)  


 


APTT   21.4 L  (22.0-30.0)  sec


 


Fibrinogen   557 H  (200-500)  mg/dL


 


Potassium  3.0 L   (3.5-5.1)  mmol/L


 


Chloride  109 H   ()  mmol/L


 


BUN  51 H   (7-17)  mg/dL


 


Creatinine  1.54 H   (0.52-1.04)  mg/dL


 


Glucose  124 H   (74-99)  mg/dL


 


Ionized Calcium Tiffany  5.8 H   (4.5-5.3)  mg/dL


 


CA 15-3 Antigen    (0.0-32.3)  U/mL


 


CA 27-29    (0.0-38.5)  U/mL














Assessment and Plan


(1) Hypercalcemia


Narrative/Plan: 


Pt has had several instances of hypercalcemia of malignancy.  Calcitonin was 

given, gentle hydration.  Ca++ normal today


Current Visit: Yes   Status: Acute   Priority: High   Code(s): E83.52 - 

HYPERCALCEMIA   SNOMED Code(s): 19384458


   





(2) Bicytopenia


Narrative/Plan: 


Secondary to treatment of breast cancer and marrow involvement with cancer





Transfuse to keep Hgb 7 or higher, plt >10,000 unless symptomatic.  WBC/ANC 

adequate at this time


Current Visit: Yes   Status: Acute   Priority: High   Code(s): D75.89 - OTHER 

SPECIFIED DISEASES OF BLOOD AND BLOOD-FORMING ORGANS   SNOMED Code(s): 90214322


   





(3) Metastatic breast cancer


Narrative/Plan: 


Mets to bone and bone marrow.  


Treatment with Ibrance/ faslodex and xgeva.  Was due to start cycle on 1/2 but 

this was delayed due to pt c/o and significant hematological changes. Counts are

currently stable.  


Tumor markers are elevated, no comparison


Current Visit: Yes   Status: Chronic   Priority: High   Code(s): C50.919 - MAL

IGNANT NEOPLASM OF UNSP SITE OF UNSPECIFIED FEMALE BREAST   SNOMED Code(s): 

737752532


   





(4) A-fib


Narrative/Plan: 


NSR on monitor at time of exam.


Hold anticoagulation due to low plt count





Current Visit: Yes   Status: Chronic   Priority: Medium   Code(s): I48.91 - 

UNSPECIFIED ATRIAL FIBRILLATION   SNOMED Code(s): 76260289


   


Plan: 





Reviewed with pt and family concerning findings on MRI brain, most suggestive of

a leptomeningeal metastasis situation.  It was explained that LP with CSF 

cytology would need to be performed to confirm the diagnosis.  Platelets would 

need to transfused until an adequate level is reached that was safe to perform 

LP.  If positive, treatment would consist of ommya port placement and IT 

chemotherapy.  If CSF negative, it would be considered non-diagnostic and 

another attempt would be necessary. 


If CNS + disease then it follows that pt has disease progression and systemic 

therapy would need to be changed, likely to parenteral type but, that is not 

absolute.  


We discussed treatment of symptoms only with hospice and allow natural death.


Pt will discuss with her son, final decisions will be made in the next 24 hours


Assured pt that she would not be discharged until plan of care in place


All questions answered to the best of my ability


 


Time with Patient: Greater than 30 (>50% time spent counseling and coordinating,

end of life discussion)
Subjective


Progress Note Date: 01/15/20





This is a 54-year-old  female patient of Valdemar Alvarez NP with past 

medical history of breast cancer status post lumpectomy in 2011 with recurrence 

of invasive ductal carcinoma stage III grade IIIa with lumpectomy January 2014 

with bone metastasis and multiple pathologic compression fractures.  Status post

cervical fusion, right femur nail and titanium david all positive for cancer in 

June 2019, kyphoplasty L3 followed by kyphoplasty T12, L1, L5-S1 with 

radiofrequency ablation October 2019, history of thyroid cancer status post 

lobectomy and radiation therapy, chronic atrial fibrillation status post 

ablation on Xarelto, hypothyroidism, chronic pain syndrome, gastroesophageal 

reflux disease, generalized anxiety disorder.  Patient's last chemotherapy was 

performed on 10/16/2019.  Last radiation to the cervical, right femur and lumbar

areas completed on 07/26/2019.  PET scan on 11/09/2019 revealed osseous 

metastatic disease. Patient has [facility] care in place.  Patient states that

she was at [facility] from October 24 through the November 5 at which 

time she underwent lumbar spine procedures.  She states she also has a left 

scapular fracture and rib fractures.  Old fractures are pathologic related to 

cancer with metastatic disease.  She had previously received all her care at 

[facility] and  now switch to Dr. Carr.   She was last admitted from her

facility November 19 to November 22 secondary to hypercalcemia secondary to 

metastatic disease to the bone, she was seen by Dr. Ramirez that time, she 

received IM calcitonin, and Zometa 4 mg IV, calcium on the last admission was 

14.9, on discharge She'll level was 7.2.





Patient came into [facility] emergency center for evaluation.  

secondary to abdominal pain of 1 week, nausea vomiting of one week, she is was 

found to have a calcium level of 18, creatinine is also worsening, currently at 

1.78 on admission, discharge creatinine was 0.55, November 23.  Lipase was 

elevated at 3253, liver function tests elevated including alkaline [place], 203.  

She has known history of cholecystectomy with sphincterotomy in the past.  She 

does have chronic pain, we are going to continue on the OxyContin 20 mg every 12

hours, IV Dilaudid, consult were made with Dr. Rodriguez gastrology for pancreatitis,

consult with Dr. Desai, and Dr. Ramirez for severe critical hypercalcemia.  We'll 

going to add additional infusion of IV pamidronate, for a total of 90 mg 1 dose

today.  IV hydration, IV Lasix 40 mg twice a day, 





1/12: Per nursing staff patient had increased confusion and competitive through 

the night.  Patient continues to have confusion unable to answer questions 

appropriately.  At times she will make sense and then rambles on and jumps 

subject while interviewing.  Patient is reluctant to go to subacute rehab 

facility she is afraid this will mess with her rehab days for the week.  Patient

is complaining of generalized pain that has increased.  Patient is requesting to

go home and receive therapy at home.  Discussed with patient that she is not 

able to walk or hold cups that she would not be able to stay home to receive 

therapy.  Patient requires more intense therapy.





1/13: At midnight, A-Team was called as patient had a hemoglobin of 6.7 and EKG 

was A. fib with RVR.  Patient reported palpitations but denied any difficulty 

breathing.  Patient was transferred to ICU.  Cardizem drip was ordered but 

patient did not require.  Patient was transfused 1 unit of packed RBCs and 

during transport from [facility] ICU, cardiac rhythm converted to sinus rhythm.  No

anticoagulation per oncology due to low platelet count.  Patient has been seen 

by cardiology with recommendations for metoprolol tartrate 50 mg 3 times daily. 

The patient is eating very little.  She is scheduled for MRI of the brain.  CA 

2729, CEA ordered by oncology is pending.  The plan to resume chemotherapy 

following discharge from the hospital.  Patient has been afebrile, blood 

pressure 137/69, pulse ox 92% on 2 L nasal cannula.  Repeat lab work this 

morning reveals hemoglobin of 9.1, WBC 6.3, platelet count 45, BUN 51 and cre

atinine 2.05.  Ionized calcium 6.7.  , ALT 75, alkaline phosphatase 180.





1/14: MRI of the brain revealed diffuse meningeal enhancement suggestive of 

metastatic disease.  No evidence of intra-axial metastatic disease.  Extensive 

calvarial enhancing lesions consistent with osseous metastatic disease.  At this

time, patient is to decide whether she wants to proceed with open PE at which 

she would require treatment at [facility] and appears to not be a 

candidate for this.  Patient family state that she would be unable to be 

transported down there.  Other option is for hospice care.  Patient wishes to 

discuss with her son.  Other family members including  are at the bed

side.  IV fluids have been decreased to 60 mL/h per Dr. Bah.  Cardiology 

recommends continuing current medications and will follow on an as-needed basis.

 Dr. Cleary has cleared the patient for diet and has signed off.  Patient has 

been afebrile, heart rate 83, blood pressure 165/91, respiratory rate 26, pulse 

ox 94% on 2 L nasal cannula.  Potassium 3.0, chloride 109, BUN 51, creatinine 

1.54, hemoglobin 8.5, platelet count 38, INR 1.3, PTT 21.4, fibrinogen 557.  

Dulcolax studies are elevated.





1/15: A patient has multiple family members at the bedside.  She is voicing some

concern that she has a UTI.  Urinalysis and culture will be checked.  She did 

have a urinalysis done on the 11th which was clearing.  Patient and family to 

make a final decision today regarding protein seeding with LP or hospice.  There

is a informational meeting with hospice to be arranged.  Patient has been afebri

le, blood pressure 137/74, pulse ox 96% on 2 L nasal cannula.  Repeat lab work 

reveals hemoglobin 8.5, platelet count 37, BUN 48 and creatinine 1.30, potassium

3.1 and has been replaced.  Calcium 6.2, magnesium 1.5.  Patient has been an 

overflow for the cardiac stepdown unit.  She continues to wait for bed outside 

of ICU.











Review of systems


Constitutional: Reports anorexia, Reports chronic pain, Reports lethargy, 

Reports malaise, Reports poor appetite


Ears, nose, mouth and throat: Denies ant. neck pain, Denies bleeding gums, 

Denies dental pain, Denies dysphagia, Denies epistaxis, Denies headache, Denies 

hoarseness, Denies mouth pain, Denies nasal congestion, Denies nasal discharge, 

Denies neck fullness/pressure, Denies neck lump, Denies nose pain, Denies 

odynophagia, Denies post-nasal drip, Denies sinus pain, Denies sinus pressure, 

Denies swelling in mouth, Denies swelling in throat, Denies sore throat, Denies 

vertigo, Denies voice changes


Cardiovascular:  Denies chest pain, Denies claudication, Denies decreased 

exercise tolerance, Denies dyspnea on exertion, Denies edema, Denies high blood 

pressure, Denies irregular heart beat, Denies leg edema, Denies lightheadedness,

Denies orthopnea, Denies palpitations, Denies paroxysmal nocturnal dyspnea, 

Denies phlebitis, Denies rapid heart beat, Denies shortness of breath, Denies 

syncope


Respiratory: Denies congestion, Denies cough, Denies cough with sputum, Denies 

dyspnea, Denies excessive sputum, Denies hemoptysis, Denies home oxygen, Denies 

pain, Denies pain on inspiration, Denies pleurisy, Denies respiratory 

infections, Denies sleep apnea, Denies snoring, Denies wheezing


Gastrointestinal: Reports abdominal pain, Reports dyspepsia, Reports early 

satiety, Reports loss of appetite, Reports nausea, Reports vomiting


Genitourinary: Denies urinary retention


Musculoskeletal: Reports gait dysfunction, Reports limitation of motion, Denies 

arm numbness/tingling, Denies atrophy, Denies fractures, Denies frequent falls, 

Denies hot joints, Denies leg numbness/tingling, Denies loss of height, Denies 

low back pain, Denies morning stiffness, Denies muscle cramps, Denies muscle 

weakness, Denies myalgias, Denies neck pain, Denies neck stiffness, Denies prior

amputations, Denies redness of joints, Denies shooting arm pain, Denies shooting

leg pain


Integumentary: No rashes, no wounds


Neurological:  Reports memory loss, reports generalized weakness, reports 

confusion


Psychiatric: Denies anhedonia, Denies anxiety, Denies anxiety attacks, Denies 

change in appetite, Denies change in libido, Denies change in sleep habits, 

Denies confusion, Denies depression, Denies difficulty concentrating, Denies 

disorientation, Denies hallucinations, Denies hopelessness, Denies hypersomnia, 

Denies insomnia, Denies irritability, Denies memory loss, Denies mood swings, 

Denies paranoia, Denies sadness/tearfulness, Denies sleep disturbances, Denies 

suicidal ideation


Allergic/Immunologic:  Denies allergic rhinitis, Denies anaphylaxis, Denies an

gioedema, Denies gluten intolerance, Denies persistent infections, Denies 

seasonal allergies, Denies urticaria, Denies wheezing








Objective





- Vital Signs


Vital signs: 


                                   Vital Signs











Temp  97.7 F   01/15/20 08:00


 


Pulse  78   01/15/20 08:00


 


Resp  21   01/15/20 08:00


 


BP  137/74   01/15/20 08:00


 


Pulse Ox  96   01/15/20 08:00








                                 Intake & Output











 01/14/20 01/15/20 01/15/20





 18:59 06:59 18:59


 


Intake Total 840 1140 400


 


Output Total 250 620 175


 


Balance 590 520 225


 


Weight 80.9 kg 81.2 kg 


 


Intake:   


 


   900 60


 


    Sodium Chloride 0.9% 1, 600 900 60





    000 ml @ 60 mls/hr IV .   





    [id number] ZA[facility]A Rx#:[id number]   


 


  Intake, IV Titration   100





  Amount   


 


    Magnesium Sulfate-D5w Pmx   100





    1 gm In Dextrose/Water 1   





    100ml.bag @ 100 mls/hr   





    IVPB Q1H [facility] Rx#:   





    [id number]   


 


  Oral 240 240 240


 


Output:   


 


  Urine 250 620 175


 


Other:   


 


  Voiding Method Indwelling Catheter Indwelling Catheter 














- Exam





General Appearance: Alert, cooperative at times, no distress, appears stated 

age.  Memory members at bedside.


Neck HEENT: Supple, no lymphadenopathy, no thyroid enlargement, no carotid 

bruits.


Lungs: Clear to auscultation without crackles or wheezes no rhonchi, no 

deformity.


Chest Wall: Chest wall normal expansion with deep inspiration.


Heart: Regular rate and rhythm, S1, S2 normal, no murmur, rub or gallop.  

Cardiac monitor sinus rhythm.


Back: Symmetric, no curvature, ROM normal, no CVA tenderness.


Abdomen: Soft, non-tender, no rebound or rigidity, no hepatosplenomegaly.  Fernandez

catheter in place.


Extremities: Extremities normal, atraumatic, no cyanosis or edema.  Strength 

equal bilaterally


Pulses: 2+ and symmetric.


Skin: Skin color, texture, tugor normal, no rashes or lesions.


Neurologic: Alert oriented x2, noted confusion, rambling cranial nerves II 

through XII intact, no motor deficit, generalized weakness





- Labs


CBC & Chem 7: 


                                 01/15/20 04:21





                                 01/15/20 04:21


Labs: 


                  Abnormal Lab Results - Last 24 Hours (Table)











  01/14/20 01/15/20 01/15/20 Range/Units





  09:53 04:21 04:21 


 


RBC   2.76 L   (3.80-5.40)  m/uL


 


Hgb   8.5 L   (11.4-16.0)  gm/dL


 


Hct   25.6 L   (34.0-46.0)  %


 


RDW   19.5 H   (11.5-15.5)  %


 


Plt Count   37 L   (150-450)  k/uL


 


Nucleated RBCs   42 H   (0-0)  /100 WBC


 


PT  13.0 H    (9.0-12.0)  sec


 


INR  1.3 H    (<1.2)  


 


APTT  21.4 L    (22.0-30.0)  sec


 


Fibrinogen  557 H    (200-500)  mg/dL


 


Potassium    3.1 L  (3.5-5.1)  mmol/L


 


Chloride    111 H  ()  mmol/L


 


BUN    48 H  (7-17)  mg/dL


 


Creatinine    1.30 H  (0.52-1.04)  mg/dL


 


Glucose    68 L  (74-99)  mg/dL


 


Ionized Calcium Tiffany    6.2 H*  (4.5-5.3)  mg/dL


 


Magnesium    1.5 L  (1.6-2.3)  mg/dL














Assessment and Plan


Plan: 





1.  Critical Hypercalcemia secondary to breast cancer with metastatic disease to

the bone.  Nephrology consult appreciated.  IV fluids 100 mL per hour, 

pamidronate IV, 19 mg 1 dose, discontinue vitamin D.  Electrophoresis studies 

negative.  Appropriately suppressed PTH from previous workup, consult with Dr. Ramirez reviewed and appreciated, oncology consult appreciated.  Discontinue 

Lasix, discontinue potassium supplement, status post calcitonin.  





2.  Breast cancer with metastatic disease to the bone, brain.  Patient has been 

given the option of LP puncture for further evaluation but understanding that 

she would have to seek treatment at [facility].  This does not sound

like an option for her.  She is contemplating hospice care at this point.





3.  Acute pancreatitis, most likely secondary to hypercalcemia, prior history of

cholecystectomy with sphincterotomy, obtain abdominal ultrasound, which is 

negative for common bile duct outpatient.  Full liquid diet, protein 

supplements.





4.  Pancytopenia with Breast cancer with bone metastasis status post multiple 

surgical interventions.  Oncology consult appreciated.





5.  Paroxysmal atrial fibrillation with episode of A. fib with RVR.   Continue 

Lopressor 50 mg 3 times daily, Xarelto on hold due to low platelet count.  

Cardiology consult appreciated.





6.  Acute renal failure secondary to ATN with underlying chronic kidney disease 

stage I, IV fluids for hydration,





7.  Hypothyroidism.  Continue levothyroxine 176 g daily.





8.  Gastroesophageal reflux disease and GI prophylaxis.  Continue Protonix.





9.  Chronic pain syndrome.  Continue Voltaren gel, Robaxin 750 mg every 6 hours 

as needed, OxyContin 30 mg extended release every 12 hours, oxycodone 10 mg 

every 4 hours as needed.





10  Transaminitis, acute, possibly possibly related to ROOT, alkaline 

phosphatase elevations also noted most likely secondary to bone  metastases, 

doubt obstructive etiology, cant r/o other itioloty include liver med





11.  History of thyroid cancer.





12.  Generalized anxiety disorder.  Continue Xanax or 0.5 mg 4 times daily as 

needed.





13.  Metastatic disease to the brain. 





14.  Acute anemia secondary to underlying breast cancer and chemotherapy status 

post transfusion of 1 unit of RBCs.





DVT prophylaxis due to low platelet count





GI prophylaxis Protonix





Discharge plan: Most likely patient will decide on hospice care.  Informational 

meeting to be arranged with [facility] hospice





Impression and plan of care have been directed as dictated by the signing 

physician.  Kadi Mulligan nurse practitioner acting as scribe for signing 

physician.
Subjective


Progress Note Date: 01/15/20


Principal diagnosis: 





MBC, hypercalcemia of malignancy





In f/u today pt continues to be confused, scattered thoughts.  Pt denied 

headache, has anxiety, no ALEX.





Objective





- Vital Signs


Vital signs: 


                                   Vital Signs











Temp  97.7 F   01/15/20 08:00


 


Pulse  78   01/15/20 08:00


 


Resp  21   01/15/20 08:00


 


BP  137/74   01/15/20 08:00


 


Pulse Ox  96   01/15/20 08:00








                                 Intake & Output











 01/14/20 01/15/20 01/15/20





 18:59 06:59 18:59


 


Intake Total 840 1140 400


 


Output Total 250 620 175


 


Balance 590 520 225


 


Weight 80.9 kg 81.2 kg 


 


Intake:   


 


   900 60


 


    Sodium Chloride 0.9% 1, 600 900 60





    000 ml @ 60 mls/hr IV .   





    [id number] [facility] Rx#:[id number]   


 


  Intake, IV Titration   100





  Amount   


 


    Magnesium Sulfate-D5w Pmx   100





    1 gm In Dextrose/Water 1   





    100ml.bag @ 100 mls/hr   





    IVPB Q1H [facility] Rx#:   





    [id number]   


 


  Oral 240 240 240


 


Output:   


 


  Urine 250 620 175


 


Other:   


 


  Voiding Method Indwelling Catheter Indwelling Catheter 














- Constitutional


Constitutional Comment(s): 





Frail, weak, needs assistance with basic needs, generalized tremor noted


General appearance: Present: cooperative, mild distress





- EENT


Eyes: Present: anicteric sclerae





- Labs


CBC & Chem 7: 


                                 01/15/20 04:21





                                 01/15/20 04:21


Labs: 


                  Abnormal Lab Results - Last 24 Hours (Table)











  01/15/20 01/15/20 Range/Units





  04:21 04:21 


 


RBC  2.76 L   (3.80-5.40)  m/uL


 


Hgb  8.5 L   (11.4-16.0)  gm/dL


 


Hct  25.6 L   (34.0-46.0)  %


 


RDW  19.5 H   (11.5-15.5)  %


 


Plt Count  37 L   (150-450)  k/uL


 


Nucleated RBCs  42 H   (0-0)  /100 WBC


 


Potassium   3.1 L  (3.5-5.1)  mmol/L


 


Chloride   111 H  ()  mmol/L


 


BUN   48 H  (7-17)  mg/dL


 


Creatinine   1.30 H  (0.52-1.04)  mg/dL


 


Glucose   68 L  (74-99)  mg/dL


 


Ionized Calcium Tiffany   6.2 H*  (4.5-5.3)  mg/dL


 


Magnesium   1.5 L  (1.6-2.3)  mg/dL














Assessment and Plan


(1) Hypercalcemia


Current Visit: Yes   Status: Resolved   Priority: High   Code(s): E83.52 - 

HYPERCALCEMIA   SNOMED Code(s): 31932123


   





(2) Bicytopenia


Narrative/Plan: 


Secondary to treatment of breast cancer and marrow involvement with cancer





CBC stable.  


Current Visit: Yes   Status: Acute   Priority: High   Code(s): D75.89 - OTHER 

SPECIFIED DISEASES OF BLOOD AND BLOOD-FORMING ORGANS   SNOMED Code(s): 49851732


   





(3) Metastatic breast cancer


Narrative/Plan: 


Mets to bone and bone marrow.  


Treatment with Ibrance/ faslodex and xgeva.  Was due to start cycle on 1/2 but 

this was delayed due to pt c/o and significant hematological changes. Counts are

currently stable.  


Tumor markers are elevated, no comparison


MRI brain suggestive of meningeal mets


Current Visit: Yes   Status: Chronic   Priority: High   Code(s): C50.919 - 

MALIGNANT NEOPLASM OF UNSP SITE OF UNSPECIFIED FEMALE BREAST   SNOMED Code(s): 

174373347


   





(4) A-fib


Narrative/Plan: 





Hold anticoagulation due to low plt count





Current Visit: Yes   Status: Chronic   Priority: Medium   Code(s): I48.91 - 

UNSPECIFIED ATRIAL FIBRILLATION   SNOMED Code(s): 58359337


   


Plan: 





1/14/20 findings on MRI brain and impression most suggestive of a leptomeningeal

metastasis was discussed with pt and son separately as he was leaving for an 

appt.  It was explained that LP with CSF cytology would need to be performed to 

confirm the diagnosis.  Platelets would need to transfused until an adequate 

level is reached that was safe to perform LP.  If positive, treatment would 

consist of ommya port placement and IT chemotherapy.  If CSF negative, it would 

be considered non-diagnostic and another attempt would be necessary. 


If CNS + disease then it follows that pt has disease progression and systemic 

therapy would need to be changed, likely to parenteral route but, that is not 

absolute.  


We did discuss treatment of symptoms only with hospice and allow natural death.


Pt  and family were to discuss with plans for final decisions today, per Soumya car, no family has been to the unit today.  I will remain available if needed 

for further discussions.  Spoke with RN about setting up a family meeting for 

tomorrow if needed.  


If decision is made for hospice care then ok to proceed.
1.68

## 2020-01-16 ENCOUNTER — HOSPITAL ENCOUNTER (INPATIENT)
Dept: HOSPITAL 47 - 5NMEDONC | Age: 55
LOS: 1 days | Discharge: NURSING, REHAB OR LONG-TERM CARE | DRG: 951 | End: 2020-01-17
Attending: FAMILY MEDICINE | Admitting: FAMILY MEDICINE
Payer: MEDICAID

## 2020-01-16 VITALS
SYSTOLIC BLOOD PRESSURE: 124 MMHG | DIASTOLIC BLOOD PRESSURE: 64 MMHG | RESPIRATION RATE: 16 BRPM | HEART RATE: 83 BPM | TEMPERATURE: 98.8 F

## 2020-01-16 DIAGNOSIS — Z87.311: ICD-10-CM

## 2020-01-16 DIAGNOSIS — D61.818: ICD-10-CM

## 2020-01-16 DIAGNOSIS — C79.31: ICD-10-CM

## 2020-01-16 DIAGNOSIS — Z83.79: ICD-10-CM

## 2020-01-16 DIAGNOSIS — I48.0: ICD-10-CM

## 2020-01-16 DIAGNOSIS — N17.0: ICD-10-CM

## 2020-01-16 DIAGNOSIS — C50.919: ICD-10-CM

## 2020-01-16 DIAGNOSIS — Z98.890: ICD-10-CM

## 2020-01-16 DIAGNOSIS — Z90.710: ICD-10-CM

## 2020-01-16 DIAGNOSIS — Z51.5: Primary | ICD-10-CM

## 2020-01-16 DIAGNOSIS — Z88.1: ICD-10-CM

## 2020-01-16 DIAGNOSIS — F32.9: ICD-10-CM

## 2020-01-16 DIAGNOSIS — K85.90: ICD-10-CM

## 2020-01-16 DIAGNOSIS — Z85.3: ICD-10-CM

## 2020-01-16 DIAGNOSIS — Z88.5: ICD-10-CM

## 2020-01-16 DIAGNOSIS — Z66: ICD-10-CM

## 2020-01-16 DIAGNOSIS — D63.0: ICD-10-CM

## 2020-01-16 DIAGNOSIS — Z82.5: ICD-10-CM

## 2020-01-16 DIAGNOSIS — E89.0: ICD-10-CM

## 2020-01-16 DIAGNOSIS — C79.51: ICD-10-CM

## 2020-01-16 DIAGNOSIS — G89.4: ICD-10-CM

## 2020-01-16 DIAGNOSIS — N18.1: ICD-10-CM

## 2020-01-16 DIAGNOSIS — Z79.899: ICD-10-CM

## 2020-01-16 DIAGNOSIS — E66.9: ICD-10-CM

## 2020-01-16 DIAGNOSIS — Z90.49: ICD-10-CM

## 2020-01-16 DIAGNOSIS — E83.52: ICD-10-CM

## 2020-01-16 DIAGNOSIS — Z92.21: ICD-10-CM

## 2020-01-16 DIAGNOSIS — Z92.3: ICD-10-CM

## 2020-01-16 DIAGNOSIS — K21.9: ICD-10-CM

## 2020-01-16 DIAGNOSIS — G89.3: ICD-10-CM

## 2020-01-16 DIAGNOSIS — Z79.890: ICD-10-CM

## 2020-01-16 DIAGNOSIS — Z88.8: ICD-10-CM

## 2020-01-16 DIAGNOSIS — Z83.49: ICD-10-CM

## 2020-01-16 DIAGNOSIS — F41.9: ICD-10-CM

## 2020-01-16 DIAGNOSIS — Z85.850: ICD-10-CM

## 2020-01-16 DIAGNOSIS — E86.0: ICD-10-CM

## 2020-01-16 DIAGNOSIS — Z98.1: ICD-10-CM

## 2020-01-16 DIAGNOSIS — R74.0: ICD-10-CM

## 2020-01-16 LAB
GLUCOSE BLD-MCNC: 76 MG/DL (ref 75–99)
MAGNESIUM SPEC-SCNC: 1.9 MG/DL (ref 1.6–2.3)
POTASSIUM SERPL-SCNC: 3.6 MMOL/L (ref 3.5–5.1)

## 2020-01-16 RX ADMIN — DILTIAZEM HYDROCHLORIDE SCH: 5 INJECTION INTRAVENOUS at 06:32

## 2020-01-16 RX ADMIN — OXYCODONE HYDROCHLORIDE SCH MG: 15 TABLET, FILM COATED, EXTENDED RELEASE ORAL at 07:18

## 2020-01-16 RX ADMIN — HYDROMORPHONE HYDROCHLORIDE PRN MG: 1 INJECTION, SOLUTION INTRAMUSCULAR; INTRAVENOUS; SUBCUTANEOUS at 07:14

## 2020-01-16 RX ADMIN — HYDROMORPHONE HYDROCHLORIDE PRN MG: 1 INJECTION, SOLUTION INTRAMUSCULAR; INTRAVENOUS; SUBCUTANEOUS at 15:41

## 2020-01-16 RX ADMIN — MAGNESIUM SULFATE IN DEXTROSE SCH MLS/HR: 10 INJECTION, SOLUTION INTRAVENOUS at 09:21

## 2020-01-16 RX ADMIN — METOPROLOL TARTRATE SCH MG: 50 TABLET, FILM COATED ORAL at 07:17

## 2020-01-16 RX ADMIN — PANTOPRAZOLE SODIUM SCH: 40 TABLET, DELAYED RELEASE ORAL at 07:17

## 2020-01-16 RX ADMIN — LIDOCAINE SCH: 50 OINTMENT TOPICAL at 22:51

## 2020-01-16 RX ADMIN — NYSTATIN SCH UNIT: 100000 SUSPENSION ORAL at 07:17

## 2020-01-16 RX ADMIN — MAGNESIUM SULFATE IN DEXTROSE SCH MLS/HR: 10 INJECTION, SOLUTION INTRAVENOUS at 10:39

## 2020-01-16 RX ADMIN — DOCUSATE SODIUM PRN MG: 100 CAPSULE, LIQUID FILLED ORAL at 07:24

## 2020-01-16 RX ADMIN — NYSTATIN SCH UNIT: 100000 SUSPENSION ORAL at 12:17

## 2020-01-16 RX ADMIN — DEXAMETHASONE SCH MG: 4 TABLET ORAL at 22:46

## 2020-01-16 RX ADMIN — HYDROMORPHONE HYDROCHLORIDE PRN MG: 1 INJECTION, SOLUTION INTRAMUSCULAR; INTRAVENOUS; SUBCUTANEOUS at 02:01

## 2020-01-16 RX ADMIN — METOPROLOL TARTRATE SCH: 50 TABLET, FILM COATED ORAL at 17:47

## 2020-01-16 RX ADMIN — METOPROLOL TARTRATE SCH MG: 50 TABLET, FILM COATED ORAL at 15:41

## 2020-01-16 RX ADMIN — HYDROMORPHONE HYDROCHLORIDE PRN MG: 1 INJECTION, SOLUTION INTRAMUSCULAR; INTRAVENOUS; SUBCUTANEOUS at 12:17

## 2020-01-16 RX ADMIN — CEFAZOLIN SCH MLS/HR: 330 INJECTION, POWDER, FOR SOLUTION INTRAMUSCULAR; INTRAVENOUS at 02:02

## 2020-01-16 RX ADMIN — METOPROLOL TARTRATE SCH MG: 50 TABLET, FILM COATED ORAL at 22:46

## 2020-01-16 RX ADMIN — DEXAMETHASONE SCH MG: 4 TABLET ORAL at 18:02

## 2020-01-16 NOTE — P.DS
Providers


Date of admission: 


01/10/20 23:08





Expected date of discharge: 01/16/20


Attending physician: 


Josefina Roberto





Consults: 





                                        





01/10/20 23:06


Consult Physician Routine 


   Consulting Provider: Imani Carr


   Consult Reason/Comments: cancer


   Do you want consulting provider notified?: Yes





01/11/20 09:24


Consult Physician Routine 


   Consulting Provider: Paola Bah


   Consult Reason/Comments: hypercalcemia


   Do you want consulting provider notified?: Yes





01/13/20 00:16


Consult Physician Routine 


   Consulting Provider: Breanna Wang


   Consult Reason/Comments: A fib with RVR


   Do you want consulting provider notified?: Yes











Primary care physician: 


Jhony LOU [facility] Course: 





This is a 54-year-old  female patient of Valdemar Alvarez NP with past 

medical history of breast cancer status post lumpectomy in 2011 with recurrence 

of invasive ductal carcinoma stage III grade IIIa with lumpectomy January 2014 

with bone metastasis and multiple pathologic compression fractures.  Status post

cervical fusion, right femur nail and titanium david all positive for cancer in 

June 2019, kyphoplasty L3 followed by kyphoplasty T12, L1, L5-S1 with 

radiofrequency ablation October 2019, history of thyroid cancer status post 

lobectomy and radiation therapy, chronic atrial fibrillation status post 

ablation on Xarelto, hypothyroidism, chronic pain syndrome, gastroesophageal 

reflux disease, generalized anxiety disorder.  Patient's last chemotherapy was 

performed on 10/16/2019.  Last radiation to the cervical, right femur and lumbar

areas completed on 07/26/2019.  PET scan on 11/09/2019 revealed osseous 

metastatic disease. Patient has [facility] care in place.  Patient states that

she was at [facility] from October 24 through the November 5 at which 

time she underwent lumbar spine procedures.  She states she also has a left 

scapular fracture and rib fractures.  Old fractures are pathologic related to 

cancer with metastatic disease.  She had previously received all her care at 

[facility] and  now switch to Dr. Carr.   She was last admitted from her

facility November 19 to November 22 secondary to hypercalcemia secondary to 

metastatic disease to the bone, she was seen by Dr. Ramirez that time, she 

received IM calcitonin, and Zometa 4 mg IV, calcium on the last admission was 

14.9, on discharge She'll level was 7.2.





Patient came into [facility] emergency center for evaluation.  

secondary to abdominal pain of 1 week, nausea vomiting of one week, she is was 

found to have a calcium level of 18, creatinine is also worsening, currently at 

1.78 on admission, discharge creatinine was 0.55, November 23.  Lipase was 

elevated at 3253, liver function tests elevated including alkaline Eldridge, 203.  

She has known history of cholecystectomy with sphincterotomy in the past.  She 

does have chronic pain, we are going to continue on the OxyContin 20 mg every 12

hours, IV Dilaudid, consult were made with Dr. Rodriguez gastrology for pancreatitis,

consult with Dr. Desai, and Dr. Ramirez for severe critical hypercalcemia.  We'll 

going to add additional infusion of IV pamidronate, for a total of 90 mg 1 dose

today.  IV hydration, IV Lasix 40 mg twice a day, 





1/12: Per nursing staff patient had increased confusion and competitive through 

the night.  Patient continues to have confusion unable to answer questions 

appropriately.  At times she will make sense and then rambles on and jumps 

subject while interviewing.  Patient is reluctant to go to subacute rehab 

facility she is afraid this will mess with her rehab days for the week.  Patient

is complaining of generalized pain that has increased.  Patient is requesting to

go home and receive therapy at home.  Discussed with patient that she is not 

able to walk or hold cups that she would not be able to stay home to receive 

therapy.  Patient requires more intense therapy.





1/13: At midnight, A-Team was called as patient had a hemoglobin of 6.7 and EKG 

was A. fib with RVR.  Patient reported palpitations but denied any difficulty 

breathing.  Patient was transferred to ICU.  Cardizem drip was ordered but 

patient did not require.  Patient was transfused 1 unit of packed RBCs and 

during transport from [facility] ICU, cardiac rhythm converted to sinus rhythm.  No

anticoagulation per oncology due to low platelet count.  Patient has been seen 

by cardiology with recommendations for metoprolol tartrate 50 mg 3 times daily. 

The patient is eating very little.  She is scheduled for MRI of the brain.  CA 

2729, CEA ordered by oncology is pending.  The plan to resume chemotherapy 

following discharge from the hospital.  Patient has been afebrile, blood 

pressure 137/69, pulse ox 92% on 2 L nasal cannula.  Repeat lab work this 

morning reveals hemoglobin of 9.1, WBC 6.3, platelet count 45, BUN 51 and crea

tinine 2.05.  Ionized calcium 6.7.  , ALT 75, alkaline phosphatase 180.





1/14: MRI of the brain revealed diffuse meningeal enhancement suggestive of 

metastatic disease.  No evidence of intra-axial metastatic disease.  Extensive 

calvarial enhancing lesions consistent with osseous metastatic disease.  At this

time, patient is to decide whether she wants to proceed with open PE at which 

she would require treatment at [facility] and appears to not be a 

candidate for this.  Patient family state that she would be unable to be 

transported down there.  Other option is for hospice care.  Patient wishes to 

discuss with her son.  Other family members including  are at the 

bedside.  IV fluids have been decreased to 60 mL/h per Dr. Bah.  Cardiology 

recommends continuing current medications and will follow on an as-needed basis.

 Dr. Cleary has cleared the patient for diet and has signed off.  Patient has 

been afebrile, heart rate 83, blood pressure 165/91, respiratory rate 26, pulse 

ox 94% on 2 L nasal cannula.  Potassium 3.0, chloride 109, BUN 51, creatinine 

1.54, hemoglobin 8.5, platelet count 38, INR 1.3, PTT 21.4, fibrinogen 557.  

Dulcolax studies are elevated.





1/15: A patient has multiple family members at the bedside.  She is voicing some

concern that she has a UTI.  Urinalysis and culture will be checked.  She did 

have a urinalysis done on the 11th which was clearing.  Patient and family to 

make a final decision today regarding protein seeding with LP or hospice.  There

is a informational meeting with hospice to be arranged.  Patient has been 

afebrile, blood pressure 137/74, pulse ox 96% on 2 L nasal cannula.  Repeat lab 

work reveals hemoglobin 8.5, platelet count 37, BUN 48 and creatinine 1.30, 

potassium 3.1 and has been replaced.  Calcium 6.2, magnesium 1.5.  Patient has 

been an overflow for the cardiac stepdown unit.  She continues to wait for bed 

outside of ICU.





1/16: Patient has been transferred to the oncology unit.  Repeat urinalysis done

yesterday revealed turbid, blood large, leukoesterase large.  Patient is 

complaining of pain at the urinary meatus and topical lidocaine has been ordered

to address this.  No sign of urinary tract infection.  Patient is in her room 

with the son,  and sister-in-law at the bedside with clear and hospice.  

Patient has decided that she will go to [facility] with hospice.  Prescriptions 

have been provided a medication reconciliation has been reviewed and all 

unnecessary medications have been discontinued.  Patient will be discharged to 

[facility] once a bed is available on Friday.





If necessary, patient will be transitioned to inpatient hospice with plan for 

discharge on Friday once bed is available at [facility].





Discharge diagnoses:


1.  Critical Hypercalcemia secondary to breast cancer with metastatic disease to

the bone and brain. 


2.  Breast cancer with metastatic disease to the bone, brain. 


3.  Acute pancreatitis, most likely secondary to hypercalcemia


4.  Pancytopenia secondary to Breast cancer with bone metastasis


5.  Paroxysmal atrial fibrillation with episode of A. fib with RVR.   


6.  Acute renal failure secondary to ATN with underlying chronic kidney disease 

stage I


7.  Hypothyroidism.  


8.  Gastroesophageal reflux disease 


9.  Chronic pain syndrome. 


10  Transaminitis, acute, possibly possibly related to ROOT


11.  History of thyroid cancer.


12.  Generalized anxiety disorder.  


13.  Acute anemia secondary to underlying breast cancer and chemotherapy status 

post transfusion of 1 unit of RBCs.








Discharge plan: [facility] with [facility]





Impression and plan of care have been directed as dictated by the signing 

physician.  Kadi Mulligan nurse practitioner acting as scribe for signing 

physician.








Patient Condition at Discharge: Stable





Plan - Discharge Summary


Discharge Rx Participant: No


New Discharge Prescriptions: 


New


   fentaNYL 75MCG/HR PATCH [Duragesic 75MCG/HR] 1 patch TRANSDERM Q72H 3 Days #1

patch


   Metoprolol Tartrate [Lopressor] 50 mg PO TID  tab


   Lidocaine 5% Oint [Xylocaine 5% Oint] 1 applic TOPICAL BID #30 gm


   Dexamethasone [Decadron] 4 mg PO TID #90 tablet


   Ibuprofen [Motrin] 600 mg PO Q6HR PRN #100 tab


     PRN Reason: Pain





Continue


   Levothyroxine Sodium [Synthroid] 175 mcg PO DAILY@0500


   Methocarbamol [Robaxin] 1,500 mg PO Q6H PRN


     PRN Reason: Spasms


   Sennosides [Senna] 8.6 mg PO BID


   Polyethylene Glycol 3350 [Miralax] 17 gm PO DAILY


   Acetaminophen Tab [Tylenol] 1,000 mg PO Q8H


   Docusate [Colace] 100 mg PO DAILY PRN


     PRN Reason: Constipation


   LORazepam [Ativan] 0.5 mg PO TID PRN #9 tab


     PRN Reason: Anxiety


   oxyCODONE HCL [Roxicodone] 10 mg PO Q4H PRN #18 tab


     PRN Reason: Pain





Discontinued


   Loratadine 10 mg PO DAILY PRN


     PRN Reason: Allergy Symptoms


   Rivaroxaban [Xarelto] 20 mg PO W/SUPPER


   HYDROmorphone HCL 2 mg PO Q6H PRN


     PRN Reason: Breakthrough Pain


   Potassium Chloride [Klor-Con 20] 20 meq PO DAILY


   Metoprolol Succinate (ER) [Toprol XL] 50 mg PO HS


   Metoprolol Succinate (ER) [Toprol XL] 25 mg PO DAILY


   oxyCODONE HCL [OxyCONTIN] 30 mg PO Q12H


   Omeprazole 20 mg PO DAILY


   Palbociclib [Ibrance] 100 mg PO AS DIRECTED


Discharge Medication List





Levothyroxine Sodium [Synthroid] 175 mcg PO DAILY@0500 02/18/18 [History]


Acetaminophen Tab [Tylenol] 1,000 mg PO Q8H 11/18/19 [History]


Methocarbamol [Robaxin] 1,500 mg PO Q6H PRN 11/18/19 [History]


Polyethylene Glycol 3350 [Miralax] 17 gm PO DAILY 11/18/19 [History]


Sennosides [Senna] 8.6 mg PO BID 11/18/19 [History]


Docusate [Colace] 100 mg PO DAILY PRN 01/10/20 [History]


Dexamethasone [Decadron] 4 mg PO TID #90 tablet 01/16/20 [Rx]


Ibuprofen [Motrin] 600 mg PO Q6HR PRN #100 tab 01/16/20 [Rx]


LORazepam [Ativan] 0.5 mg PO TID PRN #9 tab 01/16/20 [Rx]


Lidocaine 5% Oint [Xylocaine 5% Oint] 1 applic TOPICAL BID #30 gm 01/16/20 [Rx]


Metoprolol Tartrate [Lopressor] 50 mg PO TID  tab 01/16/20 [Rx]


fentaNYL 75MCG/HR PATCH [Duragesic 75MCG/HR] 1 patch TRANSDERM Q72H 3 Days #1 

patch 01/16/20 [Rx]


oxyCODONE HCL [Roxicodone] 10 mg PO Q4H PRN #18 tab 01/16/20 [Rx]








Follow up Appointment(s)/Referral(s): 


Jhony Mercado MD [Primary Care Provider] - 1-2 days

## 2020-01-17 VITALS
HEART RATE: 81 BPM | RESPIRATION RATE: 17 BRPM | TEMPERATURE: 97.5 F | SYSTOLIC BLOOD PRESSURE: 145 MMHG | DIASTOLIC BLOOD PRESSURE: 82 MMHG

## 2020-01-17 RX ADMIN — DEXAMETHASONE SCH MG: 4 TABLET ORAL at 08:02

## 2020-01-17 RX ADMIN — METOPROLOL TARTRATE SCH MG: 50 TABLET, FILM COATED ORAL at 08:02

## 2020-01-17 RX ADMIN — LIDOCAINE SCH APPLIC: 50 OINTMENT TOPICAL at 08:03
